# Patient Record
Sex: FEMALE | Race: WHITE | NOT HISPANIC OR LATINO | Employment: FULL TIME | URBAN - METROPOLITAN AREA
[De-identification: names, ages, dates, MRNs, and addresses within clinical notes are randomized per-mention and may not be internally consistent; named-entity substitution may affect disease eponyms.]

---

## 2017-03-21 ENCOUNTER — OFFICE VISIT (OUTPATIENT)
Dept: MEDICAL GROUP | Facility: CLINIC | Age: 47
End: 2017-03-21
Payer: COMMERCIAL

## 2017-03-21 ENCOUNTER — HOSPITAL ENCOUNTER (OUTPATIENT)
Dept: LAB | Facility: MEDICAL CENTER | Age: 47
End: 2017-03-21
Attending: INTERNAL MEDICINE
Payer: COMMERCIAL

## 2017-03-21 VITALS
OXYGEN SATURATION: 96 % | BODY MASS INDEX: 44.73 KG/M2 | WEIGHT: 262 LBS | DIASTOLIC BLOOD PRESSURE: 80 MMHG | TEMPERATURE: 97.4 F | SYSTOLIC BLOOD PRESSURE: 120 MMHG | HEART RATE: 67 BPM | HEIGHT: 64 IN

## 2017-03-21 DIAGNOSIS — E55.9 VITAMIN D DEFICIENCY DISEASE: ICD-10-CM

## 2017-03-21 DIAGNOSIS — E66.01 MORBID OBESITY, UNSPECIFIED OBESITY TYPE (HCC): ICD-10-CM

## 2017-03-21 LAB
25(OH)D3 SERPL-MCNC: 29 NG/ML (ref 30–100)
ALBUMIN SERPL BCP-MCNC: 4.1 G/DL (ref 3.2–4.9)
ALBUMIN/GLOB SERPL: 1.2 G/DL
ALP SERPL-CCNC: 78 U/L (ref 30–99)
ALT SERPL-CCNC: 18 U/L (ref 2–50)
ANION GAP SERPL CALC-SCNC: 5 MMOL/L (ref 0–11.9)
AST SERPL-CCNC: 17 U/L (ref 12–45)
BASOPHILS # BLD AUTO: 0.1 K/UL (ref 0–0.12)
BASOPHILS NFR BLD AUTO: 1.1 % (ref 0–1.8)
BILIRUB SERPL-MCNC: 0.3 MG/DL (ref 0.1–1.5)
BUN SERPL-MCNC: 11 MG/DL (ref 8–22)
CALCIUM SERPL-MCNC: 9.8 MG/DL (ref 8.5–10.5)
CHLORIDE SERPL-SCNC: 104 MMOL/L (ref 96–112)
CHOLEST SERPL-MCNC: 232 MG/DL (ref 100–199)
CO2 SERPL-SCNC: 30 MMOL/L (ref 20–33)
CREAT SERPL-MCNC: 0.71 MG/DL (ref 0.5–1.4)
EOSINOPHIL # BLD: 0.26 K/UL (ref 0–0.51)
EOSINOPHIL NFR BLD AUTO: 3 % (ref 0–6.9)
ERYTHROCYTE [DISTWIDTH] IN BLOOD BY AUTOMATED COUNT: 41.4 FL (ref 35.9–50)
GLOBULIN SER CALC-MCNC: 3.5 G/DL (ref 1.9–3.5)
GLUCOSE SERPL-MCNC: 108 MG/DL (ref 65–99)
HCT VFR BLD AUTO: 44.8 % (ref 37–47)
HDLC SERPL-MCNC: 47 MG/DL
HGB BLD-MCNC: 14.6 G/DL (ref 12–16)
IMM GRANULOCYTES # BLD AUTO: 0.02 K/UL (ref 0–0.11)
IMM GRANULOCYTES NFR BLD AUTO: 0.2 % (ref 0–0.9)
LDLC SERPL CALC-MCNC: 158 MG/DL
LYMPHOCYTES # BLD: 2.05 K/UL (ref 1–4.8)
LYMPHOCYTES NFR BLD AUTO: 23.4 % (ref 22–41)
MCH RBC QN AUTO: 29.6 PG (ref 27–33)
MCHC RBC AUTO-ENTMCNC: 32.6 G/DL (ref 33.6–35)
MCV RBC AUTO: 90.9 FL (ref 81.4–97.8)
MONOCYTES # BLD: 0.54 K/UL (ref 0–0.85)
MONOCYTES NFR BLD AUTO: 6.2 % (ref 0–13.4)
NEUTROPHILS # BLD: 5.78 K/UL (ref 2–7.15)
NEUTROPHILS NFR BLD AUTO: 66.1 % (ref 44–72)
NRBC # BLD AUTO: 0 K/UL
NRBC BLD-RTO: 0 /100 WBC
PLATELET # BLD AUTO: 321 K/UL (ref 164–446)
PMV BLD AUTO: 10.7 FL (ref 9–12.9)
POTASSIUM SERPL-SCNC: 4.9 MMOL/L (ref 3.6–5.5)
PROT SERPL-MCNC: 7.6 G/DL (ref 6–8.2)
RBC # BLD AUTO: 4.93 M/UL (ref 4.2–5.4)
SODIUM SERPL-SCNC: 139 MMOL/L (ref 135–145)
T4 FREE SERPL-MCNC: 0.67 NG/DL (ref 0.53–1.43)
TRIGL SERPL-MCNC: 134 MG/DL (ref 0–149)
TSH SERPL DL<=0.005 MIU/L-ACNC: 1.57 UIU/ML (ref 0.3–3.7)
WBC # BLD AUTO: 8.8 K/UL (ref 4.8–10.8)

## 2017-03-21 PROCEDURE — 84439 ASSAY OF FREE THYROXINE: CPT

## 2017-03-21 PROCEDURE — 82306 VITAMIN D 25 HYDROXY: CPT

## 2017-03-21 PROCEDURE — 99213 OFFICE O/P EST LOW 20 MIN: CPT | Performed by: INTERNAL MEDICINE

## 2017-03-21 PROCEDURE — 84443 ASSAY THYROID STIM HORMONE: CPT

## 2017-03-21 PROCEDURE — 80061 LIPID PANEL: CPT

## 2017-03-21 PROCEDURE — 85025 COMPLETE CBC W/AUTO DIFF WBC: CPT

## 2017-03-21 PROCEDURE — 36415 COLL VENOUS BLD VENIPUNCTURE: CPT

## 2017-03-21 PROCEDURE — 80053 COMPREHEN METABOLIC PANEL: CPT

## 2017-03-21 ASSESSMENT — PATIENT HEALTH QUESTIONNAIRE - PHQ9: CLINICAL INTERPRETATION OF PHQ2 SCORE: 0

## 2017-03-21 NOTE — MR AVS SNAPSHOT
"        Logan Mahoney   3/21/2017 8:40 AM   Office Visit   MRN: [id number]    Department:  [facility]   Dept Phone:  [phone]    Description:  Female : 1970   Provider:  Manuel Barbosa D.O.           Reason for Visit     Follow-Up no labs done      Allergies as of 3/21/2017     Allergen Noted Reactions    Ceclor [Cefaclor] 2012   Vomiting      You were diagnosed with     Morbid obesity, unspecified obesity type (CMS-HCC)   [1224196]       Vitamin D deficiency disease   [833049]         Vital Signs     Blood Pressure Pulse Temperature Height Weight Body Mass Index    120/80 mmHg 67 36.3 °C (97.4 °F) 1.626 m (5' 4\") 118.842 kg (262 lb) 44.95 kg/m2    Oxygen Saturation Smoking Status                96% Never Smoker           Basic Information     Date Of Birth Sex Race Ethnicity Preferred Language    1970 Female White Non- English      Problem List              ICD-10-CM Priority Class Noted - Resolved    Benign hypertension I10 Medium  2012 - Present    Preop cardiovascular exam Z01.810 High  2012 - Present    Nonspecific abnormal electrocardiogram (ECG) (EKG) R94.31 High  2012 - Present    S/P gastric surgery Z98.890   2016 - Present    Morbid obesity (CMS-HCC) E66.01   2016 - Present    Vitamin D deficiency disease E55.9   2016 - Present      Health Maintenance        Date Due Completion Dates    IMM DTaP/Tdap/Td Vaccine (1 - Tdap) 1991 ---    MAMMOGRAM 2012 ---    IMM INFLUENZA (1) 2016 ---    PAP SMEAR 3/10/2019 3/10/2016 (Done)    Override on 3/10/2016: Done            Current Immunizations     Tuberculin Skin Test 2016      Below and/or attached are the medications your provider expects you to take. Review all of your home medications and newly ordered medications with your provider and/or pharmacist. Follow medication instructions as directed by your provider and/or pharmacist. Please keep your medication list with you " and share with your provider. Update the information when medications are discontinued, doses are changed, or new medications (including over-the-counter products) are added; and carry medication information at all times in the event of emergency situations     Allergies:  CECLOR - Vomiting               Medications  Valid as of: March 21, 2017 -  9:09 AM    Generic Name Brand Name Tablet Size Instructions for use    Carvedilol (Tab) COREG 6.25 MG Take 1 Tab by mouth 2 times a day, with meals.        Ergocalciferol (Cap) DRISDOL 97498 UNITS Take 50,000 Units by mouth every 7 days.        Indomethacin (Cap) INDOCIN 50 MG Take 50 mg by mouth 3 times a day.        Lisinopril (Tab) PRINIVIL 5 MG Take 5 mg by mouth every day.        Ofloxacin (Solution) OCUFLOX 0.3 % Place 1 Drop in left eye 4 times a day.        PARoxetine HCl (Tab) PAXIL 20 MG Take 20 mg by mouth every day.        PARoxetine HCl (Tab) PAXIL 20 MG Take 1 Tab by mouth every day.        .                 Medicines prescribed today were sent to:     [facility] [address] [place] [address] - [address] [facility]    [address] [facility] [place] [address]    Phone: [phone] Fax: [phone]    Open 24 Hours?: No      Medication refill instructions:       If your prescription bottle indicates you have medication refills left, it is not necessary to call your provider’s office. Please contact your pharmacy and they will refill your medication.    If your prescription bottle indicates you do not have any refills left, you may request refills at any time through one of the following ways: The online [employer] system (except Urgent Care), by calling your provider’s office, or by asking your pharmacy to contact your provider’s office with a refill request. Medication refills are processed only during regular business hours and may not be available until the next business day. Your provider may request additional information or to have a  follow-up visit with you prior to refilling your medication.   *Please Note: Medication refills are assigned a new Rx number when refilled electronically. Your pharmacy may indicate that no refills were authorized even though a new prescription for the same medication is available at the pharmacy. Please request the medicine by name with the pharmacy before contacting your provider for a refill.        Your To Do List     Future Labs/Procedures Complete By Expires    CBC WITH DIFFERENTIAL  As directed 3/22/2018    COMP METABOLIC PANEL  As directed 3/22/2018    FREE THYROXINE  As directed 3/22/2018    LIPID PROFILE  As directed 3/22/2018    TSH  As directed 3/22/2018    VITAMIN D,25 HYDROXY  As directed 3/21/2018      Referral     A referral request has been sent to our patient care coordination department. Please allow 3-5 business days for us to process this request and contact you either by phone or mail. If you do not hear from us by the 5th business day, please call us at [phone].           MyChart Status: Patient Declined

## 2017-03-21 NOTE — PROGRESS NOTES
CC: Logan Mahoney is a 47 y.o. female is suffering from   Chief Complaint   Patient presents with   • Follow-Up     no labs done         SUBJECTIVE:  1. Morbid obesity, unspecified obesity type (CMS-HCC)  Pamela is here for follow-up continues to suffer from morbid obesity, we've discussed her working on diet exercise referred her over to the [facility] weight loss program.     2. Vitamin D deficiency disease  Patient with vitamin D deficiency is to continue on vitamin D supplementation will recheck labs        Past social, family, history:    Social History   Substance Use Topics   • Smoking status: Never Smoker    • Smokeless tobacco: Never Used   • Alcohol Use: No         MEDICATIONS:    Current outpatient prescriptions:   •  carvedilol (COREG) 6.25 MG Tab, Take 1 Tab by mouth 2 times a day, with meals., Disp: 180 Tab, Rfl: 3  •  paroxetine (PAXIL) 20 MG Tab, Take 1 Tab by mouth every day., Disp: 90 Tab, Rfl: 3  •  ofloxacin (OCUFLOX) 0.3 % Solution, Place 1 Drop in left eye 4 times a day., Disp: 1 Bottle, Rfl: 0  •  vitamin D, Ergocalciferol, (DRISDOL) 44997 UNIT CAPS capsule, Take 50,000 Units by mouth every 7 days., Disp: , Rfl:   •  paroxetine (PAXIL) 20 MG Tab, Take 20 mg by mouth every day., Disp: , Rfl:   •  indomethacin (INDOCIN) 50 MG Cap, Take 50 mg by mouth 3 times a day., Disp: , Rfl:   •  lisinopril (PRINIVIL) 5 MG TABS, Take 5 mg by mouth every day., Disp: , Rfl:     PROBLEMS:  Patient Active Problem List    Diagnosis Date Noted   • Preop cardiovascular exam 07/20/2012     Priority: High   • Nonspecific abnormal electrocardiogram (ECG) (EKG) 07/20/2012     Priority: High   • Benign hypertension 07/20/2012     Priority: Medium   • Vitamin D deficiency disease 12/02/2016   • Morbid obesity (CMS-HCC) 05/31/2016   • S/P gastric surgery 04/18/2016       REVIEW OF SYSTEMS:  Gen.:  No Nausea, Vomiting, fever, Chills.  Heart: No chest pain.  Lungs:  No shortness of Breath.  Psychological:  "Rinku unusual Anxiety depression     PHYSICAL EXAM   Constitutional: Alert, cooperative, not in acute distress.  Cardiovascular:  Rate Rhythm is regular without murmurs rubs clicks.     Thorax & Lungs: Clear to auscultation, no wheezing, rhonchi, or rales  HENT: Normocephalic, Atraumatic.  Eyes: PERRLA, EOMI, Conjunctiva normal.   Neck: Trachia is midline no swelling of the thyroid.   Lymphatic: No lymphadenopathy noted.   Neurologic: Alert & oriented x 3, cranial nerves II through XII are intact, Normal motor function, Normal sensory function, No focal deficits noted.   Psychiatric: Affect normal, Judgment normal, Mood normal.     VITAL SIGNS:/80 mmHg  Pulse 67  Temp(Src) 36.3 °C (97.4 °F)  Ht 1.626 m (5' 4\")  Wt 118.842 kg (262 lb)  BMI 44.95 kg/m2  SpO2 96%    Labs: Reviewed    Assessment:                                                     Plan:    1. Morbid obesity, unspecified obesity type (CMS-HCC)  Patient with morbid obesity status post gastric bypass surgery is to undergo lab work, referral written to [facility] weight loss program  - REFERRAL TO [facility] HEALTH IMPROVEMENT PROGRAMS (HIP) Services Requested:: General-HIP Staff to Evaluate Best Program; Reason for Visit:: Overweight/Obesity; Other:: [facility] WEIGHT LOSS PROGRAM.  - COMP METABOLIC PANEL; Future  - CBC WITH DIFFERENTIAL; Future  - LIPID PROFILE; Future  - FREE THYROXINE; Future  - TSH; Future    2. Vitamin D deficiency disease  Patient vitamin D deficiency is to undergo repeat testing  - VITAMIN D,25 HYDROXY; Future          "

## 2017-04-05 ENCOUNTER — TELEPHONE (OUTPATIENT)
Dept: MEDICAL GROUP | Facility: CLINIC | Age: 47
End: 2017-04-05

## 2017-04-05 DIAGNOSIS — E55.9 VITAMIN D DEFICIENCY DISEASE: ICD-10-CM

## 2017-04-05 DIAGNOSIS — E78.5 DYSLIPIDEMIA: ICD-10-CM

## 2017-04-05 NOTE — TELEPHONE ENCOUNTER
Please call the patient, I agree with diet exercise, patient will need to have her cholesterol rechecked in 3 months. If not successful but only need to think about medication, also have the patient take at least 1000 international units of vitamin D each day. Orders are in the hospital system and recheck cholesterol and her vitamin D

## 2017-04-05 NOTE — TELEPHONE ENCOUNTER
1. Caller Name: Logan Erikson                      Call Back Number: [phone] (home)     2. Message: pt will like to know what is it that you will recommend for her blood results, she is making changes like exercising and stop drinking coffee and watching her diet, what else would you recommend. Thank you     3. Patient approves office to leave a detailed voicemail/MyChart message: yes

## 2017-05-15 RX ORDER — PAROXETINE HYDROCHLORIDE 20 MG/1
TABLET, FILM COATED ORAL
Qty: 90 TAB | Refills: 3 | Status: SHIPPED | OUTPATIENT
Start: 2017-05-15 | End: 2017-05-22 | Stop reason: SDUPTHER

## 2017-05-19 ENCOUNTER — TELEPHONE (OUTPATIENT)
Dept: MEDICAL GROUP | Facility: CLINIC | Age: 47
End: 2017-05-19

## 2017-05-19 NOTE — TELEPHONE ENCOUNTER
ESTABLISHED PATIENT PRE-VISIT PLANNING     Note: Patient will not be contacted if there is no indication to call.     1.  Reviewed note from last office visit with PCP and/or other med group provider: Yes    2.  If any orders were placed at last visit, do we have Results/Consult Notes?        •  Labs - Labs ordered, completed and results are in chart.       •  Imaging - Imaging was not ordered at last office visit.       •  Referrals - Referral ordered, patient has NOT been seen. Problem with referral, called [facility]  [address] [facility] [address]   [place] [address]  Phone:  [phone]  spoke with Radhika, looking into whether auth is truly necessary            3.  Immunizations were updated in Epic using WebIZ?: Yes       •  Web [employer] Recommendations: Hep B, adult MMR Td (adult), adsorbed CPOX (Varicella) Hep A, adult Influenza w/preserv.      4.  Patient is due for the following Health Maintenance Topics:   Health Maintenance Due   Topic Date Due   • MAMMOGRAM  02/11/2012           5.  Patient was not informed to arrive 15 min prior to their scheduled appointment and bring in their medication bottles.

## 2017-05-22 ENCOUNTER — OFFICE VISIT (OUTPATIENT)
Dept: MEDICAL GROUP | Facility: CLINIC | Age: 47
End: 2017-05-22
Payer: COMMERCIAL

## 2017-05-22 VITALS
HEIGHT: 64 IN | TEMPERATURE: 98.6 F | BODY MASS INDEX: 45.55 KG/M2 | HEART RATE: 80 BPM | OXYGEN SATURATION: 96 % | RESPIRATION RATE: 18 BRPM | DIASTOLIC BLOOD PRESSURE: 76 MMHG | WEIGHT: 266.8 LBS | SYSTOLIC BLOOD PRESSURE: 128 MMHG

## 2017-05-22 DIAGNOSIS — E55.9 VITAMIN D DEFICIENCY DISEASE: ICD-10-CM

## 2017-05-22 DIAGNOSIS — E78.5 DYSLIPIDEMIA: ICD-10-CM

## 2017-05-22 DIAGNOSIS — I10 ESSENTIAL HYPERTENSION: ICD-10-CM

## 2017-05-22 DIAGNOSIS — E66.01 MORBID OBESITY, UNSPECIFIED OBESITY TYPE (HCC): ICD-10-CM

## 2017-05-22 DIAGNOSIS — F41.9 ANXIETY: ICD-10-CM

## 2017-05-22 PROCEDURE — 99214 OFFICE O/P EST MOD 30 MIN: CPT | Performed by: INTERNAL MEDICINE

## 2017-05-22 RX ORDER — CARVEDILOL 6.25 MG/1
6.25 TABLET ORAL 2 TIMES DAILY WITH MEALS
Qty: 180 TAB | Refills: 3 | Status: SHIPPED | OUTPATIENT
Start: 2017-05-22 | End: 2017-08-14

## 2017-05-22 RX ORDER — ALPRAZOLAM 0.25 MG/1
0.25 TABLET ORAL NIGHTLY PRN
COMMUNITY
End: 2018-09-02

## 2017-05-22 RX ORDER — PAROXETINE HYDROCHLORIDE 20 MG/1
20 TABLET, FILM COATED ORAL
Qty: 90 TAB | Refills: 3 | Status: SHIPPED | OUTPATIENT
Start: 2017-05-22 | End: 2018-05-29

## 2017-05-22 NOTE — MR AVS SNAPSHOT
"        Logan Mahoney   2017 3:00 PM   Office Visit   MRN: [id number]    Department:  [facility]   Dept Phone:  [phone]    Description:  Female : 1970   Provider:  Manuel Barbosa D.O.           Reason for Visit     Follow-Up           Allergies as of 2017     Allergen Noted Reactions    Ceclor [Cefaclor] 2012   Vomiting      You were diagnosed with     Morbid obesity, unspecified obesity type (CMS-HCC)   [9435618]       Vitamin D deficiency disease   [331866]       Dyslipidemia   [111206]       Essential hypertension   [3747024]       Anxiety   [087324]         Vital Signs     Blood Pressure Pulse Temperature Respirations Height Weight    128/76 mmHg 80 37 °C (98.6 °F) 18 1.632 m (5' 4.25\") 121.02 kg (266 lb 12.8 oz)    Body Mass Index Oxygen Saturation Breastfeeding? Smoking Status          45.44 kg/m2 96% No Never Smoker         Basic Information     Date Of Birth Sex Race Ethnicity Preferred Language    1970 Female White Non- English      Your appointments     Aug 22, 2017  3:00 PM   Established Patient with Manuel Barbosa D.O.   [address] [address] [phone]           You will be receiving a confirmation call a few days before your appointment from our automated call confirmation system.              Problem List              ICD-10-CM Priority Class Noted - Resolved    Benign hypertension I10 Medium  2012 - Present    Preop cardiovascular exam Z01.810 High  2012 - Present    Nonspecific abnormal electrocardiogram (ECG) (EKG) R94.31 High  2012 - Present    S/P gastric surgery Z98.890   2016 - Present    Morbid obesity (CMS-HCC) E66.01   2016 - Present    Vitamin D deficiency disease E55.9   2016 - Present      Health Maintenance        Date Due Completion Dates    MAMMOGRAM 2012 ---    PAP SMEAR 3/10/2019 3/10/2016 (Done)    Override on 3/10/2016: Done   " IMM DTaP/Tdap/Td Vaccine (2 - Td) 8/23/2021 8/23/2011            Current Immunizations     Tdap Vaccine 8/23/2011    Tuberculin Skin Test 5/23/2016      Below and/or attached are the medications your provider expects you to take. Review all of your home medications and newly ordered medications with your provider and/or pharmacist. Follow medication instructions as directed by your provider and/or pharmacist. Please keep your medication list with you and share with your provider. Update the information when medications are discontinued, doses are changed, or new medications (including over-the-counter products) are added; and carry medication information at all times in the event of emergency situations     Allergies:  CECLOR - Vomiting               Medications  Valid as of: May 22, 2017 -  3:49 PM    Generic Name Brand Name Tablet Size Instructions for use    ALPRAZolam (Tab) XANAX 0.25 MG Take 0.25 mg by mouth at bedtime as needed for Sleep.        Carvedilol (Tab) COREG 6.25 MG Take 1 Tab by mouth 2 times a day, with meals.        Ergocalciferol (Cap) DRISDOL 93051 UNITS Take 50,000 Units by mouth every 7 days.        Indomethacin (Cap) INDOCIN 50 MG Take 50 mg by mouth 3 times a day.        Lisinopril (Tab) PRINIVIL 5 MG Take 5 mg by mouth every day.        Ofloxacin (Solution) OCUFLOX 0.3 % Place 1 Drop in left eye 4 times a day.        PARoxetine HCl (Tab) PAXIL 20 MG Take 1 Tab by mouth every day.        .                 Medicines prescribed today were sent to:     [facility] [id number] [place] [address], [place] - [facility] AT [facility] [address]    Phone: [phone] Fax: [phone]    Open 24 Hours?: No      Medication refill instructions:       If your prescription bottle indicates you have medication refills left, it is not necessary to call your provider’s office. Please contact your pharmacy and they will refill your medication.    If your prescription  bottle indicates you do not have any refills left, you may request refills at any time through one of the following ways: The online [employer] system (except Urgent Care), by calling your provider’s office, or by asking your pharmacy to contact your provider’s office with a refill request. Medication refills are processed only during regular business hours and may not be available until the next business day. Your provider may request additional information or to have a follow-up visit with you prior to refilling your medication.   *Please Note: Medication refills are assigned a new Rx number when refilled electronically. Your pharmacy may indicate that no refills were authorized even though a new prescription for the same medication is available at the pharmacy. Please request the medicine by name with the pharmacy before contacting your provider for a refill.        Your To Do List     Future Labs/Procedures Complete By Expires    COMP METABOLIC PANEL  As directed 5/23/2018    LIPID PROFILE  As directed 5/23/2018         [employer] Status: Patient Declined

## 2017-05-22 NOTE — PROGRESS NOTES
CC: Logan Mahoney is a 47 y.o. female is suffering from   Chief Complaint   Patient presents with   • Follow-Up         SUBJECTIVE:  1. Morbid obesity, unspecified obesity type (CMS-HCC)  Pamela is here for follow-up, states that she is doing well, is working on losing weight, does have a referral to the [facility] weight loss program which I recommended she engage in.     2. Vitamin D deficiency disease  Patient with history of vitamin D deficiency, recheck levels        Past social, family, history:   Social History   Substance Use Topics   • Smoking status: Never Smoker    • Smokeless tobacco: Never Used   • Alcohol Use: No         MEDICATIONS:    Current outpatient prescriptions:   •  alprazolam (XANAX) 0.25 MG Tab, Take 0.25 mg by mouth at bedtime as needed for Sleep., Disp: , Rfl:   •  paroxetine (PAXIL) 20 MG Tab, TAKE 1 TABLET BY MOUTH EVERY DAY, Disp: 90 Tab, Rfl: 3  •  carvedilol (COREG) 6.25 MG Tab, Take 1 Tab by mouth 2 times a day, with meals., Disp: 180 Tab, Rfl: 3  •  indomethacin (INDOCIN) 50 MG Cap, Take 50 mg by mouth 3 times a day., Disp: , Rfl:   •  vitamin D, Ergocalciferol, (DRISDOL) 01079 UNIT CAPS capsule, Take 50,000 Units by mouth every 7 days., Disp: , Rfl:   •  ofloxacin (OCUFLOX) 0.3 % Solution, Place 1 Drop in left eye 4 times a day., Disp: 1 Bottle, Rfl: 0  •  lisinopril (PRINIVIL) 5 MG TABS, Take 5 mg by mouth every day., Disp: , Rfl:     PROBLEMS:  Patient Active Problem List    Diagnosis Date Noted   • Preop cardiovascular exam 07/20/2012     Priority: High   • Nonspecific abnormal electrocardiogram (ECG) (EKG) 07/20/2012     Priority: High   • Benign hypertension 07/20/2012     Priority: Medium   • Vitamin D deficiency disease 12/02/2016   • Morbid obesity (CMS-HCC) 05/31/2016   • S/P gastric surgery 04/18/2016       REVIEW OF SYSTEMS:  Gen.:  No Nausea, Vomiting, fever, Chills.  Heart: No chest pain.  Lungs:  No shortness of Breath.  Psychological: Rinku  "unusual Anxiety depression     PHYSICAL EXAM   Constitutional: Alert, cooperative, not in acute distress.  Cardiovascular:  Rate Rhythm is regular without murmurs rubs clicks.     Thorax & Lungs: Clear to auscultation, no wheezing, rhonchi, or rales  HENT: Normocephalic, Atraumatic.  Eyes: PERRLA, EOMI, Conjunctiva normal.   Neck: Trachia is midline no swelling of the thyroid.   Neurologic: Alert & oriented x 3, cranial nerves II through XII are intact, Normal motor function, Normal sensory function, No focal deficits noted.   Psychiatric: Affect normal, Judgment normal, Mood normal.     VITAL SIGNS:/76 mmHg  Pulse 80  Temp(Src) 37 °C (98.6 °F)  Resp 18  Ht 1.632 m (5' 4.25\")  Wt 121.02 kg (266 lb 12.8 oz)  BMI 45.44 kg/m2  SpO2 96%  Breastfeeding? No    Labs: Reviewed    Assessment:                                                     Plan:    1. Morbid obesity, unspecified obesity type (CMS-HCC)  [facility]     2. Vitamin D deficiency disease  Recheck prn.     3. Dyslipidemia  Recheck cholesterol 3 months  - LIPID PROFILE; Future  - COMP METABOLIC PANEL; Future    4. Essential hypertension  Continue on Coreg  - carvedilol (COREG) 6.25 MG Tab; Take 1 Tab by mouth 2 times a day, with meals.  Dispense: 180 Tab; Refill: 3    5. Anxiety  Continue on Paxil  - paroxetine (PAXIL) 20 MG Tab; Take 1 Tab by mouth every day.  Dispense: 90 Tab; Refill: 3    "

## 2017-05-22 NOTE — PROGRESS NOTES
"Subjective:      Logan Mahoney is a 47 y.o. female who presents with Follow-Up            HPI    ROS       Objective:     /76 mmHg  Pulse 80  Temp(Src) 37 °C (98.6 °F)  Resp 18  Ht 1.632 m (5' 4.25\")  Wt 121.02 kg (266 lb 12.8 oz)  BMI 45.44 kg/m2  SpO2 96%  Breastfeeding? No     Physical Exam            Assessment/Plan:     There are no diagnoses linked to this encounter.      "

## 2017-08-14 RX ORDER — CARVEDILOL 6.25 MG/1
TABLET ORAL
Qty: 180 TAB | Refills: 3 | Status: SHIPPED | OUTPATIENT
Start: 2017-08-14 | End: 2018-08-21 | Stop reason: SDUPTHER

## 2018-05-29 RX ORDER — PAROXETINE HYDROCHLORIDE 20 MG/1
TABLET, FILM COATED ORAL
Qty: 90 TAB | Refills: 3 | Status: ON HOLD | OUTPATIENT
Start: 2018-05-29 | End: 2018-11-05

## 2018-06-13 ENCOUNTER — HOSPITAL ENCOUNTER (OUTPATIENT)
Dept: LAB | Facility: MEDICAL CENTER | Age: 48
End: 2018-06-13
Attending: INTERNAL MEDICINE
Payer: COMMERCIAL

## 2018-06-13 DIAGNOSIS — E78.5 DYSLIPIDEMIA: ICD-10-CM

## 2018-06-13 LAB
ALBUMIN SERPL BCP-MCNC: 3.9 G/DL (ref 3.2–4.9)
ALBUMIN/GLOB SERPL: 1 G/DL
ALP SERPL-CCNC: 69 U/L (ref 30–99)
ALT SERPL-CCNC: 18 U/L (ref 2–50)
ANION GAP SERPL CALC-SCNC: 7 MMOL/L (ref 0–11.9)
AST SERPL-CCNC: 20 U/L (ref 12–45)
BILIRUB SERPL-MCNC: 0.3 MG/DL (ref 0.1–1.5)
BUN SERPL-MCNC: 10 MG/DL (ref 8–22)
CALCIUM SERPL-MCNC: 9 MG/DL (ref 8.5–10.5)
CHLORIDE SERPL-SCNC: 106 MMOL/L (ref 96–112)
CHOLEST SERPL-MCNC: 218 MG/DL (ref 100–199)
CO2 SERPL-SCNC: 27 MMOL/L (ref 20–33)
CREAT SERPL-MCNC: 0.78 MG/DL (ref 0.5–1.4)
GLOBULIN SER CALC-MCNC: 3.8 G/DL (ref 1.9–3.5)
GLUCOSE SERPL-MCNC: 121 MG/DL (ref 65–99)
HDLC SERPL-MCNC: 35 MG/DL
LDLC SERPL CALC-MCNC: 160 MG/DL
POTASSIUM SERPL-SCNC: 4.4 MMOL/L (ref 3.6–5.5)
PROT SERPL-MCNC: 7.7 G/DL (ref 6–8.2)
SODIUM SERPL-SCNC: 140 MMOL/L (ref 135–145)
TRIGL SERPL-MCNC: 113 MG/DL (ref 0–149)

## 2018-06-13 PROCEDURE — 80061 LIPID PANEL: CPT

## 2018-06-13 PROCEDURE — 80053 COMPREHEN METABOLIC PANEL: CPT

## 2018-06-13 PROCEDURE — 36415 COLL VENOUS BLD VENIPUNCTURE: CPT

## 2018-06-26 ENCOUNTER — HOSPITAL ENCOUNTER (EMERGENCY)
Facility: MEDICAL CENTER | Age: 48
End: 2018-06-26
Attending: EMERGENCY MEDICINE
Payer: COMMERCIAL

## 2018-06-26 VITALS
HEIGHT: 64 IN | TEMPERATURE: 98.6 F | BODY MASS INDEX: 46.52 KG/M2 | OXYGEN SATURATION: 97 % | DIASTOLIC BLOOD PRESSURE: 95 MMHG | HEART RATE: 68 BPM | WEIGHT: 272.49 LBS | SYSTOLIC BLOOD PRESSURE: 165 MMHG | RESPIRATION RATE: 16 BRPM

## 2018-06-26 DIAGNOSIS — S16.1XXA STRAIN OF NECK MUSCLE, INITIAL ENCOUNTER: ICD-10-CM

## 2018-06-26 DIAGNOSIS — S43.402A SPRAIN OF LEFT SHOULDER, UNSPECIFIED SHOULDER SPRAIN TYPE, INITIAL ENCOUNTER: ICD-10-CM

## 2018-06-26 DIAGNOSIS — S63.502A WRIST SPRAIN, LEFT, INITIAL ENCOUNTER: ICD-10-CM

## 2018-06-26 PROCEDURE — 99282 EMERGENCY DEPT VISIT SF MDM: CPT

## 2018-06-26 ASSESSMENT — PAIN SCALES - GENERAL: PAINLEVEL_OUTOF10: 8
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
## 2018-06-27 NOTE — DISCHARGE INSTRUCTIONS
Cryotherapy  Introduction  WHAT IS CRYOTHERAPY?  Cryotherapy, or cold therapy, is a treatment that uses cold temperatures to treat an injury or medical condition. It includes using cold packs or ice packs to reduce pain and swelling. Only use cryotherapy if your doctor says it is okay.  HOW DO I USE CRYOTHERAPY?  · Place a towel between the cold source and your skin.  · Apply the cold source for no more than 20 minutes at a time.  · Check your skin after 5 minutes to make sure there are no signs of a poor response to cold or skin damage. Check for:  ¨ White spots on your skin. Your skin may look blotchy or mottled.  ¨ Skin that looks blue or pale.  ¨ Skin that feels waxy or hard.  · Repeat these steps as many times each day as told by your doctor.  HOW CAN I MAKE A COLD PACK?  When using a cold pack at home to reduce pain and swelling, you can use:  · A silica gel cold pack that has been left in the freezer. You can buy this online or in stores.  · A plastic bag of frozen vegetables.  · A sealable plastic bag that has been filled with crushed ice.  Always wrap the pack in a dry or damp towel to avoid direct contact with your skin.  WHEN SHOULD I CALL MY DOCTOR?  Call your doctor if:  · You start to have white spots on your skin. This may give your skin a blotchy or mottled look.  · Your skin turns blue or pale.  · Your skin becomes waxy or hard.  · Your swelling gets worse.  This information is not intended to replace advice given to you by your health care provider. Make sure you discuss any questions you have with your health care provider.  Document Released: 06/05/2009 Document Revised: 05/25/2017 Document Reviewed: 08/31/2016  © 2017 Elsevier    
Eyes:  No visual changes, eye pain or discharge.  ENMT:  Perioral numbness. No hearing changes, pain, discharge or infections. No neck pain or stiffness.  Cardiac:  No chest pain, SOB or edema.   Respiratory:  No cough or respiratory distress.   GI:  No nausea, vomiting, diarrhea or abdominal pain.  :  No dysuria, frequency or burning.  MS:  L arm numbness. No myalgia, muscle weakness, joint pain or back pain.  Neuro:  No headache or weakness.  No LOC.   Skin:  No skin rash.
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
## 2018-07-12 ENCOUNTER — OFFICE VISIT (OUTPATIENT)
Dept: MEDICAL GROUP | Facility: CLINIC | Age: 48
End: 2018-07-12
Payer: COMMERCIAL

## 2018-07-12 VITALS
WEIGHT: 274 LBS | HEIGHT: 65 IN | RESPIRATION RATE: 16 BRPM | BODY MASS INDEX: 45.65 KG/M2 | DIASTOLIC BLOOD PRESSURE: 90 MMHG | TEMPERATURE: 98.6 F | OXYGEN SATURATION: 95 % | HEART RATE: 68 BPM | SYSTOLIC BLOOD PRESSURE: 138 MMHG

## 2018-07-12 DIAGNOSIS — Z12.39 SCREENING FOR BREAST CANCER: ICD-10-CM

## 2018-07-12 DIAGNOSIS — R73.02 IGT (IMPAIRED GLUCOSE TOLERANCE): ICD-10-CM

## 2018-07-12 DIAGNOSIS — R23.8 OTHER SKIN CHANGES: ICD-10-CM

## 2018-07-12 DIAGNOSIS — Z98.890 S/P GASTRIC SURGERY: ICD-10-CM

## 2018-07-12 PROCEDURE — 99214 OFFICE O/P EST MOD 30 MIN: CPT | Performed by: INTERNAL MEDICINE

## 2018-07-12 ASSESSMENT — PATIENT HEALTH QUESTIONNAIRE - PHQ9: CLINICAL INTERPRETATION OF PHQ2 SCORE: 0

## 2018-07-12 NOTE — PROGRESS NOTES
CC: Logan Mahoney is a 48 y.o. female is suffering from   Chief Complaint   Patient presents with   • Malaise         SUBJECTIVE:  1. S/P gastric surgery  Shells here for follow-up, is status post gastric surgery, states that she still having problems with feeling tired.    2. Screening for breast cancer  Patient's in need of screening for breast cancer orders written    3. Other skin changes  Patient has a small approximately 2 mm skin nodule at her right lateral nose.    4. IGT (impaired glucose tolerance)  Patient will need additional evaluation from impared Glucose tolerance. Patient because of her obesity is at significant risk for the development of dyana diabetes        Past social, family, history: , lives with her mother   Social History   Substance Use Topics   • Smoking status: Never Smoker   • Smokeless tobacco: Never Used   • Alcohol use No         MEDICATIONS:    Current Outpatient Prescriptions:   •  PARoxetine (PAXIL) 20 MG Tab, TAKE 1 TABLET BY MOUTH EVERY DAY, Disp: 90 Tab, Rfl: 3  •  carvedilol (COREG) 6.25 MG Tab, TAKE 1 TABLET BY MOUTH TWICE DAILY WITH MEALS, Disp: 180 Tab, Rfl: 3  •  alprazolam (XANAX) 0.25 MG Tab, Take 0.25 mg by mouth at bedtime as needed for Sleep., Disp: , Rfl:   •  ofloxacin (OCUFLOX) 0.3 % Solution, Place 1 Drop in left eye 4 times a day., Disp: 1 Bottle, Rfl: 0  •  indomethacin (INDOCIN) 50 MG Cap, Take 50 mg by mouth 3 times a day., Disp: , Rfl:   •  lisinopril (PRINIVIL) 5 MG TABS, Take 5 mg by mouth every day., Disp: , Rfl:   •  vitamin D, Ergocalciferol, (DRISDOL) 93022 UNIT CAPS capsule, Take 50,000 Units by mouth every 7 days., Disp: , Rfl:     PROBLEMS:  Patient Active Problem List    Diagnosis Date Noted   • Preop cardiovascular exam 07/20/2012     Priority: High   • Nonspecific abnormal electrocardiogram (ECG) (EKG) 07/20/2012     Priority: High   • Benign hypertension 07/20/2012     Priority: Medium   • Vitamin D deficiency disease 12/02/2016   •  "Morbid obesity (HCC) 05/31/2016   • S/P gastric surgery 04/18/2016       REVIEW OF SYSTEMS:  Gen.:  No Nausea, Vomiting, fever, Chills.  Heart: No chest pain.  Lungs:  No shortness of Breath.  Psychological: Rinku unusual Anxiety depression     PHYSICAL EXAM   Constitutional: Alert, cooperative, not in acute distress.  Cardiovascular:  Rate Rhythm is regular without murmurs rubs clicks.     Thorax & Lungs: Clear to auscultation, no wheezing, rhonchi, or rales  HENT: Normocephalic, Atraumatic.  Eyes: PERRLA, EOMI, Conjunctiva normal.   Neck: Trachia is midline no swelling of the thyroid.   Lymphatic: No lymphadenopathy noted.   Neurologic: Alert & oriented x 3, cranial nerves II through XII are intact, Normal motor function, Normal sensory function, No focal deficits noted.   Psychiatric: Affect normal, Judgment normal, Mood normal.     VITAL SIGNS:/90   Pulse 68   Temp 37 °C (98.6 °F)   Resp 16   Ht 1.638 m (5' 4.5\")   Wt 124.3 kg (274 lb)   SpO2 95%   BMI 46.31 kg/m²     Labs: Reviewed    Assessment:                                                     Plan:    1. S/P gastric surgery  Referral rewritten to bariatric surgery patient is to be sent for nutritional counseling  - REFERRAL TO BARIATRIC SURGERY  - REFERRAL TO [facility] HEALTH IMPROVEMENT PROGRAMS (HIP) Services Requested: Registered Dietitian for Medical Nutrition Therapy; Reason for Visit: Overweight/Obesity    2. Screening for breast cancer  Screening mammogram ordered  - MA-SCREEN MAMMO W/CAD-BILAT; Future    3. Other skin changes  Referral to dermatology  - REFERRAL TO DERMATOLOGY    4. IGT (impaired glucose tolerance)  Recheck hemoglobin A1c basic metabolic panel in 3 months  - HBA1C; Future  - BASIC METABOLIC PANEL; Future        "

## 2018-08-21 RX ORDER — CARVEDILOL 6.25 MG/1
TABLET ORAL
Qty: 180 TAB | Refills: 3 | Status: SHIPPED | OUTPATIENT
Start: 2018-08-21 | End: 2019-04-26 | Stop reason: SDUPTHER

## 2018-08-25 ENCOUNTER — HOSPITAL ENCOUNTER (OUTPATIENT)
Dept: RADIOLOGY | Facility: MEDICAL CENTER | Age: 48
End: 2018-08-25
Attending: INTERNAL MEDICINE
Payer: COMMERCIAL

## 2018-08-25 DIAGNOSIS — Z12.39 SCREENING FOR BREAST CANCER: ICD-10-CM

## 2018-08-25 PROCEDURE — 77067 SCR MAMMO BI INCL CAD: CPT

## 2018-09-02 ENCOUNTER — OFFICE VISIT (OUTPATIENT)
Dept: URGENT CARE | Facility: PHYSICIAN GROUP | Age: 48
End: 2018-09-02
Payer: COMMERCIAL

## 2018-09-02 VITALS
OXYGEN SATURATION: 98 % | RESPIRATION RATE: 14 BRPM | WEIGHT: 265 LBS | TEMPERATURE: 97.7 F | HEART RATE: 71 BPM | DIASTOLIC BLOOD PRESSURE: 80 MMHG | SYSTOLIC BLOOD PRESSURE: 128 MMHG | BODY MASS INDEX: 44.15 KG/M2 | HEIGHT: 65 IN

## 2018-09-02 DIAGNOSIS — H92.02 ACUTE PAIN OF LEFT EAR: ICD-10-CM

## 2018-09-02 PROCEDURE — 99213 OFFICE O/P EST LOW 20 MIN: CPT | Performed by: NURSE PRACTITIONER

## 2018-09-02 RX ORDER — FLUTICASONE PROPIONATE 50 MCG
1 SPRAY, SUSPENSION (ML) NASAL 2 TIMES DAILY
Qty: 16 G | Refills: 0 | Status: ON HOLD | OUTPATIENT
Start: 2018-09-02 | End: 2018-11-05

## 2018-09-02 ASSESSMENT — PAIN SCALES - GENERAL: PAINLEVEL: 6=MODERATE PAIN

## 2018-09-02 NOTE — PROGRESS NOTES
Chief Complaint   Patient presents with   • Ear Pain     Left ear. No fever. Headache. Onset Tues       HISTORY OF PRESENT ILLNESS: Patient is a 48 y.o. female who presents to urgent care today with complaints of left ear pain. States for the past five days she has had pain to her left ear. The pain is intermittent. She admits to associated congestion feeling of her ear and a mild headache at times. She denies nasal congestion, fever, chills, drainage, sinus pressure, numbness, tingling, neck pain, chest pain, sensory changes, changes in vision, or cough. She admits to a long history of recurrent ear and sinus infections. She has not taken any medication for symptom relief.      Patient Active Problem List    Diagnosis Date Noted   • Preop cardiovascular exam 07/20/2012     Priority: High   • Nonspecific abnormal electrocardiogram (ECG) (EKG) 07/20/2012     Priority: High   • Benign hypertension 07/20/2012     Priority: Medium   • Vitamin D deficiency disease 12/02/2016   • Morbid obesity (HCC) 05/31/2016   • S/P gastric surgery 04/18/2016       Allergies:Ceclor [cefaclor]    Current Outpatient Prescriptions Ordered in [facility]   Medication Sig Dispense Refill   • multivitamin (THERAGRAN) Tab Take 1 Tab by mouth every day.     • fluticasone (FLONASE) 50 MCG/ACT nasal spray Spray 1 Spray in nose 2 times a day. 16 g 0   • carvedilol (COREG) 6.25 MG Tab TAKE 1 TABLET BY MOUTH TWICE DAILY WITH MEALS 180 Tab 3   • PARoxetine (PAXIL) 20 MG Tab TAKE 1 TABLET BY MOUTH EVERY DAY 90 Tab 3   • vitamin D, Ergocalciferol, (DRISDOL) 15663 UNIT CAPS capsule Take 50,000 Units by mouth every 7 days.       No current [facility]-ordered facility-administered medications on file.        Past Medical History:   Diagnosis Date   • Benign hypertension 7/20/2012   • Morbid obesity (HCC) 5/31/2016   • Nonspecific abnormal electrocardiogram (ECG) (EKG) 7/20/2012   • Preop cardiovascular exam 7/20/2012   • S/P gastric surgery 4/18/2016    [facility]    • Vitamin D deficiency disease 12/2/2016       Social History   Substance Use Topics   • Smoking status: Never Smoker   • Smokeless tobacco: Never Used   • Alcohol use No       Family Status   Relation Status   • Mo (Not Specified)   • Fa (Not Specified)   • PGFa (Not Specified)     Family History   Problem Relation Age of Onset   • Hypertension Mother    • Heart Attack Father    • Other Father    • Stroke Father    • Hypertension Father    • Heart Attack Paternal Grandfather        ROS:  Review of Systems   Constitutional: Negative for fever, chills, weight loss, malaise, and fatigue.   HENT: Positive for ear pain. Negative for nosebleeds, congestion, sore throat and neck pain.    Eyes: Negative for vision changes.   Neuro: Negative for headache, sensory changes, weakness, seizure, LOC.   Cardiovascular: Negative for chest pain, palpitations, orthopnea and leg swelling.   Respiratory: Negative for cough, sputum production, shortness of breath and wheezing.   Gastrointestinal: Negative for abdominal pain, nausea, vomiting or diarrhea.   Genitourinary: Negative for dysuria, urgency and frequency.  Musculoskeletal: Negative for falls, neck pain, back pain, joint pain, myalgias.   Skin: Negative for rash, diaphoresis.     Exam:  Blood pressure 128/80, pulse 71, temperature 36.5 °C (97.7 °F), resp. rate 14, height 1.638 m (5' 4.5\"), weight 120.2 kg (265 lb), SpO2 98 %.  General: well-nourished, well-developed female in NAD  Head: normocephalic, atraumatic  Eyes: PERRLA, no conjunctival injection, acuity grossly intact, lids normal.  Ears: normal shape and symmetry, no tenderness, no discharge. External canals are without any significant edema or erythema. Tympanic membranes are without any inflammation, no effusion. Gross auditory acuity is intact.  Nose: symmetrical without tenderness, no discharge.  Mouth/Throat: reasonable hygiene, no erythema, exudates or tonsillar enlargement.  Neck: no " masses, range of motion within normal limits, no tracheal deviation. No obvious thyroid enlargement.   Lymph: no cervical adenopathy. No supraclavicular adenopathy.   Neuro: alert and oriented. Cranial nerves 1-12 grossly intact. No sensory deficit.   Cardiovascular: regular rate and rhythm. No edema.  Pulmonary: no distress. Chest is symmetrical with respiration, no wheezes, crackles, or rhonchi.   Musculoskeletal: no clubbing, appropriate muscle tone, gait is stable.  Skin: warm, dry, intact, no clubbing, no cyanosis, no rashes.   Psych: appropriate mood, affect, judgement.         Assessment/Plan:  1. Acute pain of left ear  fluticasone (FLONASE) 50 MCG/ACT nasal spray       Ear exam is benign today, without signs of bacterial infection requiring abx. I have encouraged Flonase, sleeping with HOB elevated and a few days of a decongestant for symptom relief. If no improvement, or any worsening, should return to clinic.   Supportive care, differential diagnoses, and indications for immediate follow-up discussed with patient.   Pathogenesis of diagnosis discussed including typical length and natural progression.   Instructed to return to clinic or nearest emergency department for any change in condition, further concerns, or worsening of symptoms.  Patient states understanding of the plan of care and discharge instructions.  Instructed to make an appointment, for follow up, with her primary care provider.        Please note that this dictation was created using voice recognition software. I have made every reasonable attempt to correct obvious errors, but I expect that there are errors of grammar and possibly content that I did not discover before finalizing the note.      ROLO Jara.

## 2018-09-13 ENCOUNTER — HOSPITAL ENCOUNTER (OUTPATIENT)
Dept: RADIOLOGY | Facility: MEDICAL CENTER | Age: 48
End: 2018-09-13
Attending: PHYSICIAN ASSISTANT
Payer: COMMERCIAL

## 2018-09-13 DIAGNOSIS — R11.10 VOMITING, INTRACTABILITY OF VOMITING NOT SPECIFIED, PRESENCE OF NAUSEA NOT SPECIFIED, UNSPECIFIED VOMITING TYPE: ICD-10-CM

## 2018-09-13 DIAGNOSIS — Z98.84 HISTORY OF LAPAROSCOPIC ADJUSTABLE GASTRIC BANDING: ICD-10-CM

## 2018-09-13 PROCEDURE — 74241 DX-UPPER GI-SERIES WITH KUB: CPT

## 2018-11-02 DIAGNOSIS — Z01.812 PRE-OPERATIVE LABORATORY EXAMINATION: ICD-10-CM

## 2018-11-02 DIAGNOSIS — Z01.810 PRE-OPERATIVE CARDIOVASCULAR EXAMINATION: ICD-10-CM

## 2018-11-02 LAB
ANION GAP SERPL CALC-SCNC: 6 MMOL/L (ref 0–11.9)
BUN SERPL-MCNC: 10 MG/DL (ref 8–22)
CALCIUM SERPL-MCNC: 10.1 MG/DL (ref 8.5–10.5)
CHLORIDE SERPL-SCNC: 104 MMOL/L (ref 96–112)
CO2 SERPL-SCNC: 29 MMOL/L (ref 20–33)
CREAT SERPL-MCNC: 0.78 MG/DL (ref 0.5–1.4)
EKG IMPRESSION: NORMAL
ERYTHROCYTE [DISTWIDTH] IN BLOOD BY AUTOMATED COUNT: 43.7 FL (ref 35.9–50)
GLUCOSE SERPL-MCNC: 103 MG/DL (ref 65–99)
HCT VFR BLD AUTO: 44.8 % (ref 37–47)
HGB BLD-MCNC: 14.5 G/DL (ref 12–16)
MCH RBC QN AUTO: 29.5 PG (ref 27–33)
MCHC RBC AUTO-ENTMCNC: 32.4 G/DL (ref 33.6–35)
MCV RBC AUTO: 91.2 FL (ref 81.4–97.8)
PLATELET # BLD AUTO: 326 K/UL (ref 164–446)
PMV BLD AUTO: 10.2 FL (ref 9–12.9)
POTASSIUM SERPL-SCNC: 4.6 MMOL/L (ref 3.6–5.5)
RBC # BLD AUTO: 4.91 M/UL (ref 4.2–5.4)
SODIUM SERPL-SCNC: 139 MMOL/L (ref 135–145)
WBC # BLD AUTO: 11.2 K/UL (ref 4.8–10.8)

## 2018-11-02 PROCEDURE — 85027 COMPLETE CBC AUTOMATED: CPT

## 2018-11-02 PROCEDURE — 36415 COLL VENOUS BLD VENIPUNCTURE: CPT

## 2018-11-02 PROCEDURE — 93005 ELECTROCARDIOGRAM TRACING: CPT

## 2018-11-02 PROCEDURE — 93010 ELECTROCARDIOGRAM REPORT: CPT | Performed by: INTERNAL MEDICINE

## 2018-11-02 PROCEDURE — 80048 BASIC METABOLIC PNL TOTAL CA: CPT

## 2018-11-05 ENCOUNTER — HOSPITAL ENCOUNTER (OUTPATIENT)
Facility: MEDICAL CENTER | Age: 48
End: 2018-11-05
Attending: SURGERY | Admitting: SURGERY
Payer: COMMERCIAL

## 2018-11-05 VITALS
TEMPERATURE: 97.4 F | HEART RATE: 68 BPM | RESPIRATION RATE: 15 BRPM | DIASTOLIC BLOOD PRESSURE: 84 MMHG | SYSTOLIC BLOOD PRESSURE: 138 MMHG | OXYGEN SATURATION: 93 % | HEIGHT: 65 IN | WEIGHT: 275.8 LBS | BODY MASS INDEX: 45.95 KG/M2

## 2018-11-05 DIAGNOSIS — G89.18 POSTOPERATIVE PAIN: ICD-10-CM

## 2018-11-05 PROCEDURE — 160025 RECOVERY II MINUTES (STATS): Performed by: SURGERY

## 2018-11-05 PROCEDURE — 700111 HCHG RX REV CODE 636 W/ 250 OVERRIDE (IP)

## 2018-11-05 PROCEDURE — 160046 HCHG PACU - 1ST 60 MINS PHASE II: Performed by: SURGERY

## 2018-11-05 PROCEDURE — 160035 HCHG PACU - 1ST 60 MINS PHASE I: Performed by: SURGERY

## 2018-11-05 PROCEDURE — 502570 HCHG PACK, GASTRIC BANDING: Performed by: SURGERY

## 2018-11-05 PROCEDURE — 500868 HCHG NEEDLE, SURGI(VARES): Performed by: SURGERY

## 2018-11-05 PROCEDURE — 500521 HCHG ENDOSTITCH LOAD UNIT: Performed by: SURGERY

## 2018-11-05 PROCEDURE — 700102 HCHG RX REV CODE 250 W/ 637 OVERRIDE(OP): Performed by: ANESTHESIOLOGY

## 2018-11-05 PROCEDURE — 160009 HCHG ANES TIME/MIN: Performed by: SURGERY

## 2018-11-05 PROCEDURE — 500800 HCHG LAPAROSCOPIC J/L HOOK: Performed by: SURGERY

## 2018-11-05 PROCEDURE — A6402 STERILE GAUZE <= 16 SQ IN: HCPCS | Performed by: SURGERY

## 2018-11-05 PROCEDURE — 501583 HCHG TROCAR, THRD CAN&SEAL 5X100: Performed by: SURGERY

## 2018-11-05 PROCEDURE — 501570 HCHG TROCAR, SEPARATOR: Performed by: SURGERY

## 2018-11-05 PROCEDURE — 160041 HCHG SURGERY MINUTES - EA ADDL 1 MIN LEVEL 4: Performed by: SURGERY

## 2018-11-05 PROCEDURE — 160002 HCHG RECOVERY MINUTES (STAT): Performed by: SURGERY

## 2018-11-05 PROCEDURE — 500522 HCHG ENDOSTITCH SUTURING DEVICE: Performed by: SURGERY

## 2018-11-05 PROCEDURE — 700101 HCHG RX REV CODE 250

## 2018-11-05 PROCEDURE — 700111 HCHG RX REV CODE 636 W/ 250 OVERRIDE (IP): Performed by: ANESTHESIOLOGY

## 2018-11-05 PROCEDURE — 160048 HCHG OR STATISTICAL LEVEL 1-5: Performed by: SURGERY

## 2018-11-05 PROCEDURE — 160029 HCHG SURGERY MINUTES - 1ST 30 MINS LEVEL 4: Performed by: SURGERY

## 2018-11-05 PROCEDURE — 501838 HCHG SUTURE GENERAL: Performed by: SURGERY

## 2018-11-05 PROCEDURE — A9270 NON-COVERED ITEM OR SERVICE: HCPCS | Performed by: ANESTHESIOLOGY

## 2018-11-05 RX ORDER — DIPHENHYDRAMINE HYDROCHLORIDE 50 MG/ML
12.5 INJECTION INTRAMUSCULAR; INTRAVENOUS
Status: DISCONTINUED | OUTPATIENT
Start: 2018-11-05 | End: 2018-11-05 | Stop reason: HOSPADM

## 2018-11-05 RX ORDER — MEPERIDINE HYDROCHLORIDE 25 MG/ML
12.5 INJECTION INTRAMUSCULAR; INTRAVENOUS; SUBCUTANEOUS
Status: DISCONTINUED | OUTPATIENT
Start: 2018-11-05 | End: 2018-11-05 | Stop reason: HOSPADM

## 2018-11-05 RX ORDER — HALOPERIDOL 5 MG/ML
1 INJECTION INTRAMUSCULAR
Status: DISCONTINUED | OUTPATIENT
Start: 2018-11-05 | End: 2018-11-05 | Stop reason: HOSPADM

## 2018-11-05 RX ORDER — OXYCODONE HCL 5 MG/5 ML
5 SOLUTION, ORAL ORAL
Status: COMPLETED | OUTPATIENT
Start: 2018-11-05 | End: 2018-11-05

## 2018-11-05 RX ORDER — ONDANSETRON 2 MG/ML
4 INJECTION INTRAMUSCULAR; INTRAVENOUS
Status: DISCONTINUED | OUTPATIENT
Start: 2018-11-05 | End: 2018-11-05 | Stop reason: HOSPADM

## 2018-11-05 RX ORDER — LIDOCAINE HYDROCHLORIDE 10 MG/ML
INJECTION, SOLUTION INFILTRATION; PERINEURAL
Status: COMPLETED
Start: 2018-11-05 | End: 2018-11-05

## 2018-11-05 RX ORDER — SODIUM CHLORIDE, SODIUM LACTATE, POTASSIUM CHLORIDE, CALCIUM CHLORIDE 600; 310; 30; 20 MG/100ML; MG/100ML; MG/100ML; MG/100ML
INJECTION, SOLUTION INTRAVENOUS ONCE
Status: COMPLETED | OUTPATIENT
Start: 2018-11-05 | End: 2018-11-05

## 2018-11-05 RX ORDER — OXYCODONE HCL 5 MG/5 ML
10 SOLUTION, ORAL ORAL
Status: COMPLETED | OUTPATIENT
Start: 2018-11-05 | End: 2018-11-05

## 2018-11-05 RX ORDER — HYDROCODONE BITARTRATE AND ACETAMINOPHEN 2.5; 108 MG/5ML; MG/5ML
10-20 SOLUTION ORAL EVERY 6 HOURS PRN
Qty: 150 ML | Refills: 0 | Status: SHIPPED | OUTPATIENT
Start: 2018-11-05 | End: 2018-11-08

## 2018-11-05 RX ORDER — LABETALOL HYDROCHLORIDE 5 MG/ML
5 INJECTION, SOLUTION INTRAVENOUS
Status: DISCONTINUED | OUTPATIENT
Start: 2018-11-05 | End: 2018-11-05 | Stop reason: HOSPADM

## 2018-11-05 RX ORDER — OXYCODONE HYDROCHLORIDE 5 MG/1
10 TABLET ORAL
Status: DISCONTINUED | OUTPATIENT
Start: 2018-11-05 | End: 2018-11-05 | Stop reason: HOSPADM

## 2018-11-05 RX ORDER — PAROXETINE HYDROCHLORIDE 20 MG/1
20 TABLET, FILM COATED ORAL EVERY EVENING
COMMUNITY
End: 2019-04-26 | Stop reason: SDUPTHER

## 2018-11-05 RX ORDER — BUPIVACAINE HYDROCHLORIDE AND EPINEPHRINE 5; 5 MG/ML; UG/ML
INJECTION, SOLUTION PERINEURAL
Status: DISCONTINUED | OUTPATIENT
Start: 2018-11-05 | End: 2018-11-05 | Stop reason: HOSPADM

## 2018-11-05 RX ORDER — HYDROMORPHONE HYDROCHLORIDE 2 MG/ML
0.4 INJECTION, SOLUTION INTRAMUSCULAR; INTRAVENOUS; SUBCUTANEOUS
Status: DISCONTINUED | OUTPATIENT
Start: 2018-11-05 | End: 2018-11-05 | Stop reason: HOSPADM

## 2018-11-05 RX ORDER — SODIUM CHLORIDE, SODIUM LACTATE, POTASSIUM CHLORIDE, CALCIUM CHLORIDE 600; 310; 30; 20 MG/100ML; MG/100ML; MG/100ML; MG/100ML
INJECTION, SOLUTION INTRAVENOUS CONTINUOUS
Status: DISCONTINUED | OUTPATIENT
Start: 2018-11-05 | End: 2018-11-05 | Stop reason: HOSPADM

## 2018-11-05 RX ORDER — HYDROMORPHONE HYDROCHLORIDE 2 MG/ML
0.2 INJECTION, SOLUTION INTRAMUSCULAR; INTRAVENOUS; SUBCUTANEOUS
Status: DISCONTINUED | OUTPATIENT
Start: 2018-11-05 | End: 2018-11-05 | Stop reason: HOSPADM

## 2018-11-05 RX ORDER — HYDROMORPHONE HYDROCHLORIDE 2 MG/ML
0.1 INJECTION, SOLUTION INTRAMUSCULAR; INTRAVENOUS; SUBCUTANEOUS
Status: DISCONTINUED | OUTPATIENT
Start: 2018-11-05 | End: 2018-11-05 | Stop reason: HOSPADM

## 2018-11-05 RX ORDER — OXYCODONE HYDROCHLORIDE 5 MG/1
5 TABLET ORAL
Status: DISCONTINUED | OUTPATIENT
Start: 2018-11-05 | End: 2018-11-05 | Stop reason: HOSPADM

## 2018-11-05 RX ORDER — HYDRALAZINE HYDROCHLORIDE 20 MG/ML
5 INJECTION INTRAMUSCULAR; INTRAVENOUS
Status: DISCONTINUED | OUTPATIENT
Start: 2018-11-05 | End: 2018-11-05 | Stop reason: HOSPADM

## 2018-11-05 RX ORDER — MIDAZOLAM HYDROCHLORIDE 1 MG/ML
1 INJECTION INTRAMUSCULAR; INTRAVENOUS
Status: DISCONTINUED | OUTPATIENT
Start: 2018-11-05 | End: 2018-11-05 | Stop reason: HOSPADM

## 2018-11-05 RX ADMIN — SODIUM CHLORIDE, SODIUM LACTATE, POTASSIUM CHLORIDE, CALCIUM CHLORIDE: 600; 310; 30; 20 INJECTION, SOLUTION INTRAVENOUS at 13:15

## 2018-11-05 RX ADMIN — FENTANYL CITRATE 50 MCG: 50 INJECTION, SOLUTION INTRAMUSCULAR; INTRAVENOUS at 15:34

## 2018-11-05 RX ADMIN — LIDOCAINE HYDROCHLORIDE 0.5 ML: 10 INJECTION, SOLUTION INFILTRATION; PERINEURAL at 13:15

## 2018-11-05 RX ADMIN — OXYCODONE HYDROCHLORIDE 10 MG: 5 SOLUTION ORAL at 15:34

## 2018-11-05 RX ADMIN — Medication 0.5 ML: at 13:15

## 2018-11-05 ASSESSMENT — PAIN SCALES - GENERAL
PAINLEVEL_OUTOF10: 2
PAINLEVEL_OUTOF10: 0
PAINLEVEL_OUTOF10: 2
PAINLEVEL_OUTOF10: 5
PAINLEVEL_OUTOF10: 2
PAINLEVEL_OUTOF10: 2

## 2018-11-05 NOTE — OR SURGEON
Immediate Post OP Note    PreOp Diagnosis: Slipped lap band    PostOp Diagnosis: Same    Procedure(s):  LAPAROSCOPIC BAND REMOVAL- GASTRIC BAND and Port - Wound Class: Clean Contaminated    Surgeon(s):  John H Ganser, M.D.    Anesthesiologist/Type of Anesthesia:  Anesthesiologist: Ronald Henriquez M.D./General    Surgical Staff:  Assistant: TERRELL Mohr  Circulator: Anne Upton R.N.  Scrub Person: Kirsty Sheth; Marian Jolly    Specimens removed if any:  * No specimens in log *    Estimated Blood Loss: 0    Findings: 0    Complications: 0        11/5/2018 3:09 PM John H Ganser, M.D.

## 2018-11-05 NOTE — OP REPORT
DATE OF SERVICE:  11/05/2018    PREOPERATIVE DIAGNOSIS:  Slipped lap band.    POSTOPERATIVE DIAGNOSIS:  Slipped lap band.    PROCEDURE PERFORMED:  Laparoscopic removal of lap band and subcutaneous port.    SURGEON:  John H. Ganser, MD    ASSISTANT:  Balaji Araiza PA-C    ANESTHESIA:  General.    ANESTHESIOLOGIST:  Ronald Henriquez MD    INDICATIONS:  The patient is a 48-year-old female with remote history of lap   band surgery.  She did well over the time, but has developed significant   dysphagia and regurgitation.  Upper GI shows slipped lap band.  She has   symptomatically been improved since fluid was removed, but is still having some dysphagia.    Risks, benefits, and alternatives to removal of the band and port were   outlined in detail.  All questions answered and she wished to proceed.    DESCRIPTION OF PROCEDURE:  The patient was identified and general anesthetic   administered.  Her abdomen was prepped and draped in the usual sterile   fashion.  Local anesthesia of 0.5% Marcaine with epinephrine was injected   prior to making skin incision.  Small incision was made at the prior   laparoscopic site in the midepigastric region and the Veress needle passed.    The abdomen was insufflated with carbon dioxide without incident and a 5-mm   blunt trocar and 5-mm 30-degree scope inserted.  Tanja liver retractor was   passed through a small subxiphoid incision, used to elevate the lateral   segment of liver and was held with the robotic arm.  Epigastric 5, left upper   quadrant 15, left lateral subcostal 5 mm trocars were placed, all in prior   laparoscopic sites.  The band tubing was cut at the fascia.  Scar capsule   overlying the band on the stomach was incised with cautery and the band   unbuckled.  It was then able to be removed from around the stomach.  Anterior   portion of the scar capsule was then able to be removed with cautery, opening   the stomach was nicely.  An orogastric tube was able to be passed  by   anesthesia into the stomach and decompress it further.  Hemostasis was   assured.  Liver retractor and trocars were withdrawn, the abdomen was   deflated.    The scar capsule around the port was then incised with cautery at the base of   the 15 mm trocar site.  The port was then able to be removed from the fascia   including its sutures.  Anterior portion of the scar capsule was removed with   the port as well leaving the posterior scar capsule and muscle.  Hemostasis   was assured.  Subcutaneous tissues were approximated with 3-0 Vicryl and skin   incisions closed with 4-0 Vicryl subcuticular sutures.  Sterile dressings were   applied.  The patient returned to recovery room in stable condition.       ____________________________________     JOHN H. GANSER, MD [facility] / [facility]    DD:  11/05/2018 15:13:15  DT:  11/05/2018 15:29:37    D#:  [id number]  Job#:  [id number]    cc: ANDREE ROSALES DO

## 2018-11-06 NOTE — OR NURSING
Patient arrived in phase 2 reporting tolerable pain level of 2/10 on pain scale. Patient reports no n/v and is motivated for discharge. Patient's family is at bedside. 1700 Patient felt slight nausea when stood up to dress, but it passed quickly. Discharge instructions reviewed, patient and  verbalized understanding. Patient voided at 1715. Patient discharged via wheelchair with belongings in her possession, escorted by CNA.

## 2018-11-06 NOTE — DISCHARGE INSTRUCTIONS
ACTIVITY: Rest and take it easy for the first 24 hours.  A responsible adult is recommended to remain with you during that time.  It is normal to feel sleepy.  We encourage you to not do anything that requires balance, judgment or coordination.    MILD FLU-LIKE SYMPTOMS ARE NORMAL. YOU MAY EXPERIENCE GENERALIZED MUSCLE ACHES, THROAT IRRITATION, HEADACHE AND/OR SOME NAUSEA.    FOR 24 HOURS DO NOT:  Drive, operate machinery or run household appliances.  Drink beer or alcoholic beverages.   Make important decisions or sign legal documents.    SPECIAL INSTRUCTIONS: Follow instructions given to you by MD. Call MD with any questions.    DIET: To avoid nausea, slowly advance diet as tolerated, avoiding spicy or greasy foods for the first day.  Add more substantial food to your diet according to your physician's instructions.  Babies can be fed formula or breast milk as soon as they are hungry.  INCREASE FLUIDS AND FIBER TO AVOID CONSTIPATION.    SURGICAL DRESSING/BATHING: Follow instructions given by MD    FOLLOW-UP APPOINTMENT:  A follow-up appointment should be arranged with your doctor in 1-2 weeks; call to schedule.    You should CALL YOUR PHYSICIAN if you develop:  Fever greater than 101 degrees F.  Pain not relieved by medication, or persistent nausea or vomiting.  Excessive bleeding (blood soaking through dressing) or unexpected drainage from the wound.  Extreme redness or swelling around the incision site, drainage of pus or foul smelling drainage.  Inability to urinate or empty your bladder within 8 hours.  Problems with breathing or chest pain.    You should call 911 if you develop problems with breathing or chest pain.  If you are unable to contact your doctor or surgical center, you should go to the nearest emergency room or urgent care center.  Physician's telephone #: Dr. Ganser: [phone]    If any questions arise, call your doctor.  If your doctor is not available, please feel free to call the Surgical  Center at [phone].  The Center is open Monday through Friday from 7AM to 7PM.  You can also call the HEALTH HOTLINE open 24 hours/day, 7 days/week and speak to a nurse at [phone], or toll free at [phone].    A registered nurse may call you a few days after your surgery to see how you are doing after your procedure.    MEDICATIONS: Resume taking daily medication.  Take prescribed pain medication with food.  If no medication is prescribed, you may take non-aspirin pain medication if needed.  PAIN MEDICATION CAN BE VERY CONSTIPATING.  Take a stool softener or laxative such as senokot, pericolace, or milk of magnesia if needed.    Prescription given for Hycet (pain).  Last pain medication given at 3:34pm, next available dose at approximately 9:30pm.    If your physician has prescribed pain medication that includes Acetaminophen (Tylenol), do not take additional Acetaminophen (Tylenol) while taking the prescribed medication.    Depression / Suicide Risk    As you are discharged from this [facility] facility, it is important to learn how to keep safe from harming yourself.    Recognize the warning signs:  · Abrupt changes in personality, positive or negative- including increase in energy   · Giving away possessions  · Change in eating patterns- significant weight changes-  positive or negative  · Change in sleeping patterns- unable to sleep or sleeping all the time   · Unwillingness or inability to communicate  · Depression  · Unusual sadness, discouragement and loneliness  · Talk of wanting to die  · Neglect of personal appearance   · Rebelliousness- reckless behavior  · Withdrawal from people/activities they love  · Confusion- inability to concentrate     If you or a loved one observes any of these behaviors or has concerns about self-harm, here's what you can do:  · Talk about it- your feelings and reasons for harming yourself  · Remove any means that you might use to hurt yourself (examples:  pills, rope, extension cords, firearm)  · Get professional help from the community (Mental Health, Substance Abuse, psychological counseling)  · Do not be alone:Call your Safe Contact- someone whom you trust who will be there for you.  · Call your local CRISIS HOTLINE 349-7819 or 881-978-9190  · Call your local Children's Mobile Crisis Response Team [place] (336) 582-4956 or www.[employer]  · Call the toll free National Suicide Prevention Hotlines   · National Suicide Prevention Lifeline 943-882-BRSR (0720)  · National Hope Line Network 800-SUICIDE (621-6044)

## 2018-11-06 NOTE — OR NURSING
The pt is awake and oriented. Respirations are regular and easy. Pain is controlled, the pt is comfortable. 5 lap sites across abdomen dry and intact.

## 2019-01-05 ENCOUNTER — OFFICE VISIT (OUTPATIENT)
Dept: URGENT CARE | Facility: PHYSICIAN GROUP | Age: 49
End: 2019-01-05
Payer: COMMERCIAL

## 2019-01-05 VITALS
BODY MASS INDEX: 45.82 KG/M2 | DIASTOLIC BLOOD PRESSURE: 90 MMHG | WEIGHT: 275 LBS | TEMPERATURE: 98.2 F | HEART RATE: 91 BPM | HEIGHT: 65 IN | SYSTOLIC BLOOD PRESSURE: 140 MMHG | RESPIRATION RATE: 14 BRPM | OXYGEN SATURATION: 95 %

## 2019-01-05 DIAGNOSIS — S03.00XA DISLOCATION OF TEMPOROMANDIBULAR JOINT, INITIAL ENCOUNTER: ICD-10-CM

## 2019-01-05 PROCEDURE — 99214 OFFICE O/P EST MOD 30 MIN: CPT | Performed by: FAMILY MEDICINE

## 2019-01-05 RX ORDER — IBUPROFEN 800 MG/1
800 TABLET ORAL EVERY 8 HOURS PRN
Qty: 30 TAB | Refills: 0 | Status: SHIPPED | OUTPATIENT
Start: 2019-01-05 | End: 2019-02-04

## 2019-01-05 NOTE — PROGRESS NOTES
"Chief Complaint   Patient presents with   • Jaw Pain               HPI:        C/o dull, achy rt jaw pain x 1 d.   States pain is dull, achy but no worse with biting down.   She has a hx of TMJ.   States pain is constant, but mild.   NO FEVER.   Denies ear pain or tinnitus.       Past Medical History:   Diagnosis Date   • Anemia    • Anesthesia     ponv   • Arthritis     knees/neck/elbows   • Benign hypertension 7/20/2012   • Heart burn     abdominal   • Morbid obesity (HCC) 5/31/2016   • Nonspecific abnormal electrocardiogram (ECG) (EKG) 7/20/2012   • Preop cardiovascular exam 7/20/2012   • S/P gastric surgery 4/18/2016    [facility]    • Vitamin D deficiency disease 12/2/2016     Social History   Substance Use Topics   • Smoking status: Never Smoker   • Smokeless tobacco: Never Used   • Alcohol use No               Review of Systems   Constitutional: Negative for fever, chills and malaise/fatigue.   Eyes: Negative for vision changes, d/c.    Respiratory: Negative for cough and sputum production.    Cardiovascular: Negative for chest pain and palpitations.   Gastrointestinal: Negative for nausea, vomiting, abdominal pain, diarrhea and constipation.   Genitourinary: Negative for dysuria, urgency and frequency.   Skin: Negative for rash or  itching.   Neurological: Negative for dizziness and tingling.   Psychiatric/Behavioral: Negative for depression.   Hematologic/lymphatic - denies bruising or excessive bleeding  All other systems reviewed and are negative.      OBJECTIVE  /90   Pulse 91   Temp 36.8 °C (98.2 °F) (Temporal)   Resp 14   Ht 1.651 m (5' 5\")   Wt 124.7 kg (275 lb)   LMP 03/01/2016   SpO2 95%   BMI 45.76 kg/m²   HEENT - PERRLA, EOMI  +TTP over right TMJ.    No dental abscess or oral cavity lesions.   No parotid tenderness or nodules.   Lymph - no cervical , auricular, submental LAD  Neuro - alert and oriented x3. CN 2-12 grossly intact.  Lungs - CTA. No wheezes, rhonchi or " rales.  Heart - regular rate and rhythm without murmur.  Abdomen - soft and non-tender, bowel sounds active x4.  Musculoskeletal - No lower extremity edema noted.  Nuzhat's sign negative bilaterally        A/P:    1. Pain of temporomandibular joint, initial encounter     - ibuprofen (MOTRIN) 800 MG Tab; Take 1 Tab by mouth every 8 hours as needed.  Dispense: 30 Tab; Refill: 0       Follow up in one week if no improvement, sooner if symptoms worsen.

## 2019-01-16 ENCOUNTER — APPOINTMENT (RX ONLY)
Dept: URBAN - METROPOLITAN AREA CLINIC 22 | Facility: CLINIC | Age: 49
Setting detail: DERMATOLOGY
End: 2019-01-16

## 2019-01-16 DIAGNOSIS — Z71.89 OTHER SPECIFIED COUNSELING: ICD-10-CM

## 2019-01-16 DIAGNOSIS — L73.8 OTHER SPECIFIED FOLLICULAR DISORDERS: ICD-10-CM

## 2019-01-16 DIAGNOSIS — L81.4 OTHER MELANIN HYPERPIGMENTATION: ICD-10-CM

## 2019-01-16 DIAGNOSIS — L82.1 OTHER SEBORRHEIC KERATOSIS: ICD-10-CM

## 2019-01-16 DIAGNOSIS — Q819 OTHER SPECIFIED ANOMALIES OF SKIN: ICD-10-CM

## 2019-01-16 DIAGNOSIS — Q826 OTHER SPECIFIED ANOMALIES OF SKIN: ICD-10-CM

## 2019-01-16 DIAGNOSIS — Q828 OTHER SPECIFIED ANOMALIES OF SKIN: ICD-10-CM

## 2019-01-16 DIAGNOSIS — D22 MELANOCYTIC NEVI: ICD-10-CM

## 2019-01-16 DIAGNOSIS — D18.0 HEMANGIOMA: ICD-10-CM

## 2019-01-16 PROBLEM — D48.5 NEOPLASM OF UNCERTAIN BEHAVIOR OF SKIN: Status: ACTIVE | Noted: 2019-01-16

## 2019-01-16 PROBLEM — D22.5 MELANOCYTIC NEVI OF TRUNK: Status: ACTIVE | Noted: 2019-01-16

## 2019-01-16 PROBLEM — D22.62 MELANOCYTIC NEVI OF LEFT UPPER LIMB, INCLUDING SHOULDER: Status: ACTIVE | Noted: 2019-01-16

## 2019-01-16 PROBLEM — Q82.8 OTHER SPECIFIED CONGENITAL MALFORMATIONS OF SKIN: Status: ACTIVE | Noted: 2019-01-16

## 2019-01-16 PROBLEM — D22.61 MELANOCYTIC NEVI OF RIGHT UPPER LIMB, INCLUDING SHOULDER: Status: ACTIVE | Noted: 2019-01-16

## 2019-01-16 PROBLEM — D18.01 HEMANGIOMA OF SKIN AND SUBCUTANEOUS TISSUE: Status: ACTIVE | Noted: 2019-01-16

## 2019-01-16 PROCEDURE — ? TREATMENT REGIMEN

## 2019-01-16 PROCEDURE — ? BIOPSY BY SHAVE METHOD

## 2019-01-16 PROCEDURE — ? COUNSELING

## 2019-01-16 PROCEDURE — 11102 TANGNTL BX SKIN SINGLE LES: CPT

## 2019-01-16 PROCEDURE — 99203 OFFICE O/P NEW LOW 30 MIN: CPT | Mod: 25

## 2019-01-16 ASSESSMENT — LOCATION SIMPLE DESCRIPTION DERM
LOCATION SIMPLE: LEFT FOREHEAD
LOCATION SIMPLE: LEFT POSTERIOR UPPER ARM
LOCATION SIMPLE: RIGHT UPPER BACK
LOCATION SIMPLE: LEFT FOREARM
LOCATION SIMPLE: RIGHT FOREARM
LOCATION SIMPLE: RIGHT LOWER BACK
LOCATION SIMPLE: RIGHT POSTERIOR UPPER ARM
LOCATION SIMPLE: ABDOMEN

## 2019-01-16 ASSESSMENT — LOCATION DETAILED DESCRIPTION DERM
LOCATION DETAILED: LEFT PROXIMAL POSTERIOR UPPER ARM
LOCATION DETAILED: LEFT DISTAL DORSAL FOREARM
LOCATION DETAILED: LEFT INFERIOR MEDIAL FOREHEAD
LOCATION DETAILED: LEFT PROXIMAL DORSAL FOREARM
LOCATION DETAILED: RIGHT SUPERIOR MEDIAL MIDBACK
LOCATION DETAILED: EPIGASTRIC SKIN
LOCATION DETAILED: RIGHT SUPERIOR UPPER BACK
LOCATION DETAILED: RIGHT PROXIMAL POSTERIOR UPPER ARM
LOCATION DETAILED: RIGHT MEDIAL UPPER BACK
LOCATION DETAILED: RIGHT PROXIMAL DORSAL FOREARM

## 2019-01-16 ASSESSMENT — LOCATION ZONE DERM
LOCATION ZONE: ARM
LOCATION ZONE: TRUNK
LOCATION ZONE: FACE

## 2019-01-16 NOTE — PROCEDURE: BIOPSY BY SHAVE METHOD
Post-Care Instructions: I reviewed with the patient in detail post-care instructions. Patient is to keep the biopsy site dry overnight, and then apply bacitracin twice daily until healed. Patient may apply hydrogen peroxide soaks to remove any crusting.
Was A Bandage Applied: Yes
Anesthesia Volume In Cc: 1
Electrodesiccation And Curettage Text: The wound bed was treated with electrodesiccation and curettage after the biopsy was performed.
Dressing: bandage
Biopsy Method: Personna blade
Bill For Surgical Tray: no
Lab: [id number]
Silver Nitrate Text: The wound bed was treated with silver nitrate after the biopsy was performed.
Hemostasis: Drysol
Notification Instructions: Patient will be notified of biopsy results. However, patient instructed to call the office if not contacted within 2 weeks.
Detail Level: Detailed
Curettage Text: The wound bed was treated with curettage after the biopsy was performed.
Anesthesia Type: 1% lidocaine with 1:100,000 epinephrine and a 1:3 solution of 8.4% sodium bicarbonate
Size Of Lesion In Cm: 0
Lab Facility: 
Wound Care: Vaseline
Consent: Written consent was obtained and risks were reviewed including but not limited to scarring, infection, bleeding, scabbing, incomplete removal, nerve damage and allergy to anesthesia.
Cryotherapy Text: The wound bed was treated with cryotherapy after the biopsy was performed.
Depth Of Biopsy: dermis
Type Of Destruction Used: Curettage
Billing Type: Third-Party Bill
Biopsy Type: H and E
Electrodesiccation Text: The wound bed was treated with electrodesiccation after the biopsy was performed.

## 2019-02-04 ENCOUNTER — OFFICE VISIT (OUTPATIENT)
Dept: URGENT CARE | Facility: PHYSICIAN GROUP | Age: 49
End: 2019-02-04
Payer: COMMERCIAL

## 2019-02-04 VITALS
DIASTOLIC BLOOD PRESSURE: 80 MMHG | BODY MASS INDEX: 45.82 KG/M2 | SYSTOLIC BLOOD PRESSURE: 132 MMHG | HEART RATE: 94 BPM | WEIGHT: 275 LBS | TEMPERATURE: 99.4 F | HEIGHT: 65 IN | OXYGEN SATURATION: 93 % | RESPIRATION RATE: 20 BRPM

## 2019-02-04 DIAGNOSIS — J02.9 VIRAL PHARYNGITIS: ICD-10-CM

## 2019-02-04 DIAGNOSIS — J06.9 VIRAL UPPER RESPIRATORY INFECTION: ICD-10-CM

## 2019-02-04 LAB
INT CON NEG: NEGATIVE
INT CON POS: POSITIVE
S PYO AG THROAT QL: NEGATIVE

## 2019-02-04 PROCEDURE — 99214 OFFICE O/P EST MOD 30 MIN: CPT | Performed by: PHYSICIAN ASSISTANT

## 2019-02-04 PROCEDURE — 87880 STREP A ASSAY W/OPTIC: CPT | Performed by: PHYSICIAN ASSISTANT

## 2019-02-04 RX ORDER — DEXTROMETHORPHAN HYDROBROMIDE AND PROMETHAZINE HYDROCHLORIDE 15; 6.25 MG/5ML; MG/5ML
5 SYRUP ORAL 4 TIMES DAILY PRN
Qty: 140 ML | Refills: 0 | Status: SHIPPED | OUTPATIENT
Start: 2019-02-04 | End: 2020-02-10

## 2019-02-04 ASSESSMENT — ENCOUNTER SYMPTOMS
SPUTUM PRODUCTION: 1
DIARRHEA: 0
COUGH: 1
VOMITING: 0
EYE DISCHARGE: 0
FLANK PAIN: 0
EYE PAIN: 0
CHILLS: 0
ABDOMINAL PAIN: 0
HEADACHES: 0
SHORTNESS OF BREATH: 0
DIZZINESS: 0
SORE THROAT: 1
CONSTIPATION: 0
MYALGIAS: 0
SINUS PAIN: 0
HEMOPTYSIS: 0
NAUSEA: 0

## 2019-02-04 NOTE — PROGRESS NOTES
Subjective:   Logan Mahoney is a 48 y.o. female who presents for Cough (x 1 day// congestion// sore throat// fever last night)       Patient presents today with productive cough, congestion, sore throat, subjective fever starting yesterday. She is an , so she is concerned about strep.       Cough   This is a new problem. The current episode started yesterday. The problem has been gradually worsening. The problem occurs every few minutes. The cough is productive of sputum. Associated symptoms include ear pain, nasal congestion and a sore throat. Pertinent negatives include no chest pain, chills, ear congestion, headaches, hemoptysis, myalgias or shortness of breath. Nothing aggravates the symptoms. She has tried nothing for the symptoms. The treatment provided no relief.     Review of Systems   Constitutional: Negative for chills.        Positive for subjective fever   HENT: Positive for congestion, ear pain and sore throat. Negative for ear discharge and sinus pain.    Eyes: Negative for pain and discharge.   Respiratory: Positive for cough and sputum production. Negative for hemoptysis and shortness of breath.    Cardiovascular: Negative for chest pain.   Gastrointestinal: Negative for abdominal pain, constipation, diarrhea, nausea and vomiting.   Genitourinary: Negative for dysuria, flank pain, frequency and urgency.   Musculoskeletal: Negative for joint pain and myalgias.   Neurological: Negative for dizziness and headaches.   All other systems reviewed and are negative.      PMH:  has a past medical history of Anemia; Anesthesia; Arthritis; Benign hypertension (7/20/2012); Heart burn; Morbid obesity (HCC) (5/31/2016); Nonspecific abnormal electrocardiogram (ECG) (EKG) (7/20/2012); Preop cardiovascular exam (7/20/2012); S/P gastric surgery (4/18/2016); and Vitamin D deficiency disease (12/2/2016).    MEDS:   Current Outpatient Prescriptions:   •  lidocaine viscous 2% (XYLOCAINE) 2  "% Solution, Take 15 mL by mouth every 6 hours as needed for Throat/Mouth Pain for up to 5 days., Disp: 300 mL, Rfl: 0  •  promethazine-dextromethorphan (PROMETHAZINE-DM) 6.25-15 MG/5ML syrup, Take 5 mL by mouth 4 times a day as needed for Cough., Disp: 140 mL, Rfl: 0  •  PARoxetine (PAXIL) 20 MG Tab, Take 20 mg by mouth every evening., Disp: , Rfl:   •  multivitamin (THERAGRAN) Tab, Take 1 Tab by mouth every day., Disp: , Rfl:   •  carvedilol (COREG) 6.25 MG Tab, TAKE 1 TABLET BY MOUTH TWICE DAILY WITH MEALS, Disp: 180 Tab, Rfl: 3  •  vitamin D, Ergocalciferol, (DRISDOL) 88212 UNIT CAPS capsule, Take 1,000 Units by mouth every evening., Disp: , Rfl:     ALLERGIES:   Allergies   Allergen Reactions   • Ceclor [Cefaclor] Vomiting   • Cinnamon Vomiting   • Eggs Vomiting   • Latex Hives   • Peppers [Pepper-Bell Food Allergy] Vomiting   • Pineapple Hives and Nausea     Only skin       SURGHX:   Past Surgical History:   Procedure Laterality Date   • LAPAROSCOPIC BAND REMOVAL  11/5/2018    Procedure: LAPAROSCOPIC BAND REMOVAL- GASTRIC BAND and Port;  Surgeon: John H Ganser, M.D.;  Location: SURGERY [facility];  Service: General   • OTHER ABDOMINAL SURGERY  2013    gastric band w/port   • KNEE ARTHROSCOPY  1990    Arthroscopy, Knee R   • TONSILLECTOMY         SOCHX:  reports that she has never smoked. She has never used smokeless tobacco. She reports that she does not drink alcohol or use drugs.    FH: Reviewed with patient, not pertinent to this visit.     Objective:   /80   Pulse 94   Temp 37.4 °C (99.4 °F) (Temporal)   Resp 20   Ht 1.651 m (5' 5\")   Wt 124.7 kg (275 lb)   LMP 03/01/2016   SpO2 93%   BMI 45.76 kg/m²   Physical Exam   Constitutional: She is oriented to person, place, and time. She appears well-developed and well-nourished. No distress.   HENT:   Head: Normocephalic and atraumatic.   Right Ear: Tympanic membrane, external ear and ear canal normal.   Left Ear: Tympanic membrane, external " ear and ear canal normal.   Nose: Mucosal edema and rhinorrhea present. No sinus tenderness. Right sinus exhibits no maxillary sinus tenderness and no frontal sinus tenderness. Left sinus exhibits no maxillary sinus tenderness and no frontal sinus tenderness.   Mouth/Throat: Uvula is midline and mucous membranes are normal. Posterior oropharyngeal edema and posterior oropharyngeal erythema present.   Eyes: Conjunctivae and EOM are normal.   Neck: Normal range of motion. Neck supple. No tracheal deviation present.   Cardiovascular: Normal rate, regular rhythm and normal heart sounds.    Pulmonary/Chest: Effort normal and breath sounds normal. No respiratory distress. She has no wheezes. She has no rhonchi. She has no rales.   Musculoskeletal:   ROM normal all four extremities   Lymphadenopathy:     She has cervical adenopathy.   Neurological: She is alert and oriented to person, place, and time.   Skin: Skin is warm and dry.   Psychiatric: She has a normal mood and affect. Her behavior is normal. Judgment and thought content normal.     - POCT Rapid Strep A: negative    Assessment/Plan:   1. Viral pharyngitis  - POCT Rapid Strep A  - lidocaine viscous 2% (XYLOCAINE) 2 % Solution; Take 15 mL by mouth every 6 hours as needed for Throat/Mouth Pain for up to 5 days.  Dispense: 300 mL; Refill: 0    2. Viral upper respiratory infection  - promethazine-dextromethorphan (PROMETHAZINE-DM) 6.25-15 MG/5ML syrup; Take 5 mL by mouth 4 times a day as needed for Cough.  Dispense: 140 mL; Refill: 0    - Advised to try OTC mucinex, fluticasone nasal spray, ibuprofen/acetaminophen prn for symptom relief  - Advised to return if symptoms worsen or do not improve    Differential diagnosis, natural history, supportive care, and indications for immediate follow-up discussed.

## 2019-02-11 ENCOUNTER — APPOINTMENT (RX ONLY)
Dept: URBAN - METROPOLITAN AREA CLINIC 22 | Facility: CLINIC | Age: 49
Setting detail: DERMATOLOGY
End: 2019-02-11

## 2019-02-11 PROBLEM — C44.91 BASAL CELL CARCINOMA OF SKIN, UNSPECIFIED: Status: ACTIVE | Noted: 2019-02-11

## 2019-02-11 PROCEDURE — ? MOHS SURGERY PHONE CONSULTATION

## 2019-02-11 NOTE — PROCEDURE: MOHS SURGERY PHONE CONSULTATION
Office Location Of Mohs Surgery: [facility]
Which Antibiotic Do They Take For Surgical Prophylaxis?: Amoxicillin (2 grams)
Has The Patient Ever Had A Heart Attack Or Stroke?: No
Pathology Accession #: [id number]
Patient's Insurance: [facility]
Date Of Mohs Surgery: 03/04/2019
Patient Reported Location: nasal root
Referring Provider: Mateusz Castaneda PA-C
Detail Level: Simple
Has The Pathology Report Been Received?: Yes

## 2019-03-04 ENCOUNTER — APPOINTMENT (RX ONLY)
Dept: URBAN - METROPOLITAN AREA CLINIC 36 | Facility: CLINIC | Age: 49
Setting detail: DERMATOLOGY
End: 2019-03-04

## 2019-03-04 DIAGNOSIS — L57.8 OTHER SKIN CHANGES DUE TO CHRONIC EXPOSURE TO NONIONIZING RADIATION: ICD-10-CM

## 2019-03-04 DIAGNOSIS — L57.0 ACTINIC KERATOSIS: ICD-10-CM

## 2019-03-04 PROBLEM — C44.311 BASAL CELL CARCINOMA OF SKIN OF NOSE: Status: ACTIVE | Noted: 2019-03-04

## 2019-03-04 PROCEDURE — ? PHOTODYNAMIC THERAPY COUNSELING

## 2019-03-04 PROCEDURE — 17312 MOHS ADDL STAGE: CPT

## 2019-03-04 PROCEDURE — 17311 MOHS 1 STAGE H/N/HF/G: CPT

## 2019-03-04 PROCEDURE — ? COUNSELING

## 2019-03-04 PROCEDURE — ? MOHS SURGERY

## 2019-03-04 PROCEDURE — 13152 CMPLX RPR E/N/E/L 2.6-7.5 CM: CPT

## 2019-03-04 NOTE — HPI: PROCEDURE (MOHS)
Has The Growth Been Previously Biopsied?: has been previously biopsied
Additional History: Referral from JADA Costa.

## 2019-03-04 NOTE — PROCEDURE: MOHS SURGERY
Cheiloplasty (Less Than 50%) Text: A decision was made to reconstruct the defect with a  cheiloplasty.  The defect was undermined extensively.  Additional obicularis oris muscle was excised with a 15 blade scalpel.  The defect was converted into a full thickness wedge, of less than 50% of the vertical height of the lip, to facilite a better cosmetic result.  Small vessels were then tied off with 5-0 monocyrl. The obicularis oris, superficial fascia, adipose and dermis were then reapproximated.  After the deeper layers were approximated the epidermis was reapproximated with particular care given to realign the vermilion border.
Bill For Surgical Tray: no
Bilobed Flap Text: The defect edges were debeveled with a #15 scalpel blade.  Given the location of the defect and the proximity to free margins a bilobe flap was deemed most appropriate.  Using a sterile surgical marker, an appropriate bilobe flap drawn around the defect.    The area thus outlined was incised deep to adipose tissue with a #15 scalpel blade.  The skin margins were undermined to an appropriate distance in all directions utilizing iris scissors.
Show Repair Assistants Variable: Yes
Epidermal Closure: running cuticular
Asc Procedure Text (B): After obtaining clear surgical margins the patient was sent to an ASC for surgical repair.  The patient understands they will receive post-surgical care and follow-up from the ASC physician.
Stage 3: Number Of Blocks?: 1
Stage 10: Number Of Blocks?: 0
Split-Thickness Skin Graft Text: The defect edges were debeveled with a #15 scalpel blade.  Given the location of the defect, shape of the defect and the proximity to free margins a split thickness skin graft was deemed most appropriate.  Using a sterile surgical marker, the primary defect shape was transferred to the donor site. The split thickness graft was then harvested.  The skin graft was then placed in the primary defect and oriented appropriately.
No Repair - Repaired With Adjacent Surgical Defect Text (Leave Blank If You Do Not Want): After obtaining clear surgical margins the defect was repaired concurrently with another surgical defect which was in close approximation.
O-T Plasty Text: The defect edges were debeveled with a #15 scalpel blade.  Given the location of the defect, shape of the defect and the proximity to free margins an O-T plasty was deemed most appropriate.  Using a sterile surgical marker, an appropriate O-T plasty was drawn incorporating the defect and placing the expected incisions within the relaxed skin tension lines where possible.    The area thus outlined was incised deep to adipose tissue with a #15 scalpel blade.  The skin margins were undermined to an appropriate distance in all directions utilizing iris scissors.
Repair Performed By Another Provider Text (Leave Blank If You Do Not Want): After obtaining clear surgical margins the defect was repaired by another provider.
Advancement-Rotation Flap Text: The defect edges were debeveled with a #15 scalpel blade.  Given the location of the defect, shape of the defect and the proximity to free margins an advancement-rotation flap was deemed most appropriate.  Using a sterile surgical marker, an appropriate flap was drawn incorporating the defect and placing the expected incisions within the relaxed skin tension lines where possible. The area thus outlined was incised deep to adipose tissue with a #15 scalpel blade.  The skin margins were undermined to an appropriate distance in all directions utilizing iris scissors.
Stage 5: Additional Anesthesia Type: 1% lidocaine with epinephrine
Wound Care (No Sutures): Petrolatum
Crescentic Intermediate Repair Preamble Text (Leave Blank If You Do Not Want): Undermining was performed with blunt dissection.
Deep Sutures: 5-0 Maxon
Crescentic Advancement Flap Text: The defect edges were debeveled with a #15 scalpel blade.  Given the location of the defect and the proximity to free margins a crescentic advancement flap was deemed most appropriate.  Using a sterile surgical marker, the appropriate advancement flap was drawn incorporating the defect and placing the expected incisions within the relaxed skin tension lines where possible.    The area thus outlined was incised deep to adipose tissue with a #15 scalpel blade.  The skin margins were undermined to an appropriate distance in all directions utilizing iris scissors.
Xenograft Text: The defect edges were debeveled with a #15 scalpel blade.  Given the location of the defect, shape of the defect and the proximity to free margins a xenograft was deemed most appropriate.  The graft was then trimmed to fit the size of the defect.  The graft was then placed in the primary defect and oriented appropriately.
Graft Donor Site Dermal Sutures (Optional): 5-0 Polysorb
Rhombic Flap Text: The defect edges were debeveled with a #15 scalpel blade.  Given the location of the defect and the proximity to free margins a rhombic flap was deemed most appropriate.  Using a sterile surgical marker, an appropriate rhombic flap was drawn incorporating the defect.    The area thus outlined was incised deep to adipose tissue with a #15 scalpel blade.  The skin margins were undermined to an appropriate distance in all directions utilizing iris scissors.
Consent 3/Introductory Paragraph: I gave the patient a chance to ask questions they had about the procedure.  Following this I explained the Mohs procedure and consent was obtained. The risks, benefits and alternatives to therapy were discussed in detail. Specifically, the risks of infection, scarring, bleeding, prolonged wound healing, incomplete removal, allergy to anesthesia, nerve injury and recurrence were addressed. Prior to the procedure, the treatment site was clearly identified and confirmed by the patient. All components of Universal Protocol/PAUSE Rule completed.
Epidermal Sutures: 6-0 Surgipro
Graft Donor Site Epidermal Sutures (Optional): 5-0 Surgipro
Double Island Pedicle Flap Text: The defect edges were debeveled with a #15 scalpel blade.  Given the location of the defect, shape of the defect and the proximity to free margins a double island pedicle advancement flap was deemed most appropriate.  Using a sterile surgical marker, an appropriate advancement flap was drawn incorporating the defect, outlining the appropriate donor tissue and placing the expected incisions within the relaxed skin tension lines where possible.    The area thus outlined was incised deep to adipose tissue with a #15 scalpel blade.  The skin margins were undermined to an appropriate distance in all directions around the primary defect and laterally outward around the island pedicle utilizing iris scissors.  There was minimal undermining beneath the pedicle flap.
Oculoplastic Surgeon Procedure Text (A): After obtaining clear surgical margins the patient was sent to oculoplastics for surgical repair.  The patient understands they will receive post-surgical care and follow-up from the referring physician's office.
Postop Diagnosis: same
Plastic Surgeon Procedure Text (F): After obtaining clear surgical margins the patient was sent to plastics for surgical repair.  The patient understands they will receive post-surgical care and follow-up from the referring physician's office.
Estimated Blood Loss (Cc): minimal
Otolaryngologist Procedure Text (E): After obtaining clear surgical margins the patient was sent to otolaryngology for surgical repair.  The patient understands they will receive post-surgical care and follow-up from the referring physician's office.
O-L Flap Text: The defect edges were debeveled with a #15 scalpel blade.  Given the location of the defect, shape of the defect and the proximity to free margins an O-L flap was deemed most appropriate.  Using a sterile surgical marker, an appropriate advancement flap was drawn incorporating the defect and placing the expected incisions within the relaxed skin tension lines where possible.    The area thus outlined was incised deep to adipose tissue with a #15 scalpel blade.  The skin margins were undermined to an appropriate distance in all directions utilizing iris scissors.
Mid-Level Procedure Text (C): After obtaining clear surgical margins the patient was sent to a mid-level provider for surgical repair.  The patient understands they will receive post-surgical care and follow-up from the mid-level provider.
Trilobed Flap Text: The defect edges were debeveled with a #15 scalpel blade.  Given the location of the defect and the proximity to free margins a trilobed flap was deemed most appropriate.  Using a sterile surgical marker, an appropriate trilobed flap drawn around the defect.    The area thus outlined was incised deep to adipose tissue with a #15 scalpel blade.  The skin margins were undermined to an appropriate distance in all directions utilizing iris scissors.
Location Indication Override (Is Already Calculated Based On Selected Body Location): Area H
Partial Purse String (Simple) Text: Given the location of the defect and the characteristics of the surrounding skin a simple purse string closure was deemed most appropriate.  Undermining was performed circumfirentially around the surgical defect.  A purse string suture was then placed and tightened. Wound tension only allowed a partial closure of the circular defect.
Manual Repair Warning Statement: We plan on removing the manually selected variable below in favor of our much easier automatic structured text blocks found in the previous tab. We decided to do this to help make the flow better and give you the full power of structured data. Manual selection is never going to be ideal in our platform and I would encourage you to avoid using manual selection from this point on, especially since I will be sunsetting this feature. It is important that you do one of two things with the customized text below. First, you can save all of the text in a word file so you can have it for future reference. Second, transfer the text to the appropriate area in the Library tab. Lastly, if there is a flap or graft type which we do not have you need to let us know right away so I can add it in before the variable is hidden. No need to panic, we plan to give you roughly 6 months to make the change.
Ear Wedge Repair Text: A wedge excision was completed by carrying down an excision through the full thickness of the ear and cartilage with an inward facing Burow's triangle. The wound was then closed in a layered fashion.
Bilateral Helical Rim Advancement Flap Text: The defect edges were debeveled with a #15 blade scalpel.  Given the location of the defect and the proximity to free margins (helical rim) a bilateral helical rim advancement flap was deemed most appropriate.  Using a sterile surgical marker, the appropriate advancement flaps were drawn incorporating the defect and placing the expected incisions between the helical rim and antihelix where possible.  The area thus outlined was incised through and through with a #15 scalpel blade.  With a skin hook and iris scissors, the flaps were gently and sharply undermined and freed up.
Post-Care Instructions: I reviewed with the patient in detail post-care instructions. Patient is not to engage in any heavy lifting, exercise, or swimming for the next 14 days. Should the patient develop any fevers, chills, bleeding, severe pain patient will contact the office immediately.
Purse String (Simple) Text: Given the location of the defect and the characteristics of the surrounding skin a purse string closure was deemed most appropriate.  Undermining was performed circumfirentially around the surgical defect.  A purse string suture was then placed and tightened.
Alar Island Pedicle Flap Text: The defect edges were debeveled with a #15 scalpel blade.  Given the location of the defect, shape of the defect and the proximity to the alar rim an island pedicle advancement flap was deemed most appropriate.  Using a sterile surgical marker, an appropriate advancement flap was drawn incorporating the defect, outlining the appropriate donor tissue and placing the expected incisions within the nasal ala running parallel to the alar rim. The area thus outlined was incised with a #15 scalpel blade.  The skin margins were undermined minimally to an appropriate distance in all directions around the primary defect and laterally outward around the island pedicle utilizing iris scissors.  There was minimal undermining beneath the pedicle flap.
Lazy S Complex Repair Preamble Text (Leave Blank If You Do Not Want): Extensive wide undermining was performed.
Unna Boot Text: An Unna boot was placed to help immobilize the limb and facilitate more rapid healing.
Closure 3 Information: This tab is for additional flaps and grafts above and beyond our usual structured repairs.  Please note if you enter information here it will not currently bill and you will need to add the billing information manually.
W Plasty Text: The lesion was extirpated to the level of the fat with a #15 scalpel blade.  Given the location of the defect, shape of the defect and the proximity to free margins a W-plasty was deemed most appropriate for repair.  Using a sterile surgical marker, the appropriate transposition arms of the W-plasty were drawn incorporating the defect and placing the expected incisions within the relaxed skin tension lines where possible.    The area thus outlined was incised deep to adipose tissue with a #15 scalpel blade.  The skin margins were undermined to an appropriate distance in all directions utilizing iris scissors.  The opposing transposition arms were then transposed into place in opposite direction and anchored with interrupted buried subcutaneous sutures.
Consent (Marginal Mandibular)/Introductory Paragraph: The rationale for Mohs was explained to the patient and consent was obtained. The risks, benefits and alternatives to therapy were discussed in detail. Specifically, the risks of damage to the marginal mandibular branch of the facial nerve, infection, scarring, bleeding, prolonged wound healing, incomplete removal, allergy to anesthesia, and recurrence were addressed. Prior to the procedure, the treatment site was clearly identified and confirmed by the patient. All components of Universal Protocol/PAUSE Rule completed.
Consent (Near Eyelid Margin)/Introductory Paragraph: The rationale for Mohs was explained to the patient and consent was obtained. The risks, benefits and alternatives to therapy were discussed in detail. Specifically, the risks of ectropion or eyelid deformity, infection, scarring, bleeding, prolonged wound healing, incomplete removal, allergy to anesthesia, nerve injury and recurrence were addressed. Prior to the procedure, the treatment site was clearly identified and confirmed by the patient. All components of Universal Protocol/PAUSE Rule completed.
Tarsorrhaphy Text: A tarsorrhaphy was performed using Frost sutures.
Eye Protection Verbiage: Before proceeding with the stage, a plastic scleral shield was inserted. The globe was anesthetized with a few drops of 1% lidocaine with 1:100,000 epinephrine. Then, an appropriate sized scleral shield was chosen and coated with lacrilube ointment. The shield was gently inserted and left in place for the duration of each stage. After the stage was completed, the shield was gently removed.
Consent (Nose)/Introductory Paragraph: The rationale for Mohs was explained to the patient and consent was obtained. The risks, benefits and alternatives to therapy were discussed in detail. Specifically, the risks of nasal deformity, changes in the flow of air through the nose, infection, scarring, bleeding, prolonged wound healing, incomplete removal, allergy to anesthesia, nerve injury and recurrence were addressed. Prior to the procedure, the treatment site was clearly identified and confirmed by the patient. All components of Universal Protocol/PAUSE Rule completed.
O-Z Plasty Text: The defect edges were debeveled with a #15 scalpel blade.  Given the location of the defect, shape of the defect and the proximity to free margins an O-Z plasty (double transposition flap) was deemed most appropriate.  Using a sterile surgical marker, the appropriate transposition flaps were drawn incorporating the defect and placing the expected incisions within the relaxed skin tension lines where possible.    The area thus outlined was incised deep to adipose tissue with a #15 scalpel blade.  The skin margins were undermined to an appropriate distance in all directions utilizing iris scissors.  Hemostasis was achieved with electrocautery.  The flaps were then transposed into place, one clockwise and the other counterclockwise, and anchored with interrupted buried subcutaneous sutures.
Area L Indication Text: Tumors in this location are included in Area L (trunk and extremities).  Mohs surgery is indicated for larger tumors, or tumors with aggressive histologic features, in these anatomic locations.
Bcc Histology Text: There were numerous aggregates of basaloid cells.
Non-Graft Cartilage Fenestration Text: The cartilage was fenestrated with a 2mm punch biopsy to help facilitate healing.
Closure 2 Information: This tab is for additional flaps and grafts, including complex repair and grafts and complex repair and flaps. You can also specify a different location for the additional defect, if the location is the same you do not need to select a new one. We will insert the automated text for the repair you select below just as we do for solitary flaps and grafts. Please note that at this time if you select a location with a different insurance zone you will need to override the ICD10 and CPT if appropriate.
Consent (Scalp)/Introductory Paragraph: The rationale for Mohs was explained to the patient and consent was obtained. The risks, benefits and alternatives to therapy were discussed in detail. Specifically, the risks of changes in hair growth pattern secondary to repair, infection, scarring, bleeding, prolonged wound healing, incomplete removal, allergy to anesthesia, nerve injury and recurrence were addressed. Prior to the procedure, the treatment site was clearly identified and confirmed by the patient. All components of Universal Protocol/PAUSE Rule completed.
Dermal Autograft Text: The defect edges were debeveled with a #15 scalpel blade.  Given the location of the defect, shape of the defect and the proximity to free margins a dermal autograft was deemed most appropriate.  Using a sterile surgical marker, the primary defect shape was transferred to the donor site. The area thus outlined was incised deep to adipose tissue with a #15 scalpel blade.  The harvested graft was then trimmed of adipose and epidermal tissue until only dermis was left.  The skin graft was then placed in the primary defect and oriented appropriately.
Donor Site Anesthesia Type: same as repair anesthesia
Surgical Defect Length In Cm (Optional): 1.2
S Plasty Text: Given the location and shape of the defect, and the orientation of relaxed skin tension lines, an S-plasty was deemed most appropriate for repair.  Using a sterile surgical marker, the appropriate outline of the S-plasty was drawn, incorporating the defect and placing the expected incisions within the relaxed skin tension lines where possible.  The area thus outlined was incised deep to adipose tissue with a #15 scalpel blade.  The skin margins were undermined to an appropriate distance in all directions utilizing iris scissors. The skin flaps were advanced over the defect.  The opposing margins were then approximated with interrupted buried subcutaneous sutures.
Surgical Defect Length In Cm (Optional): 1.1
Island Pedicle Flap-Requiring Vessel Identification Text: The defect edges were debeveled with a #15 scalpel blade.  Given the location of the defect, shape of the defect and the proximity to free margins an island pedicle advancement flap was deemed most appropriate.  Using a sterile surgical marker, an appropriate advancement flap was drawn, based on the axial vessel mentioned above, incorporating the defect, outlining the appropriate donor tissue and placing the expected incisions within the relaxed skin tension lines where possible.    The area thus outlined was incised deep to adipose tissue with a #15 scalpel blade.  The skin margins were undermined to an appropriate distance in all directions around the primary defect and laterally outward around the island pedicle utilizing iris scissors.  There was minimal undermining beneath the pedicle flap.
Island Pedicle Flap Text: The defect edges were debeveled with a #15 scalpel blade.  Given the location of the defect, shape of the defect and the proximity to free margins an island pedicle advancement flap was deemed most appropriate.  Using a sterile surgical marker, an appropriate advancement flap was drawn incorporating the defect, outlining the appropriate donor tissue and placing the expected incisions within the relaxed skin tension lines where possible.    The area thus outlined was incised deep to adipose tissue with a #15 scalpel blade.  The skin margins were undermined to an appropriate distance in all directions around the primary defect and laterally outward around the island pedicle utilizing iris scissors.  There was minimal undermining beneath the pedicle flap.
Same Histology In Subsequent Stages Text: The pattern and morphology of the tumor is as described in the first stage.
No Residual Tumor Seen Histology Text: There were no malignant cells seen in the sections examined.
Mauc Instructions: By selecting yes to the question below the MAUC number will be added into the note.  This will be calculated automatically based on the diagnosis chosen, the size entered, the body zone selected (H,M,L) and the specific indications you chose. You will also have the option to override the Mohs AUC if you disagree with the automatically calculated number and this option is found in the Case Summary tab.
Mohs Method Verbiage: An incision at a 45 degree angle following the standard Mohs approach was done and the specimen was harvested as a microscopic controlled layer.
Transposition Flap Text: The defect edges were debeveled with a #15 scalpel blade.  Given the location of the defect and the proximity to free margins a transposition flap was deemed most appropriate.  Using a sterile surgical marker, an appropriate transposition flap was drawn incorporating the defect.    The area thus outlined was incised deep to adipose tissue with a #15 scalpel blade.  The skin margins were undermined to an appropriate distance in all directions utilizing iris scissors.
Hatchet Flap Text: The defect edges were debeveled with a #15 scalpel blade.  Given the location of the defect, shape of the defect and the proximity to free margins a hatchet flap was deemed most appropriate.  Using a sterile surgical marker, an appropriate hatchet flap was drawn incorporating the defect and placing the expected incisions within the relaxed skin tension lines where possible.    The area thus outlined was incised deep to adipose tissue with a #15 scalpel blade.  The skin margins were undermined to an appropriate distance in all directions utilizing iris scissors.
Paramedian Forehead Flap Text: A decision was made to reconstruct the defect utilizing an interpolation axial flap and a staged reconstruction.  A telfa template was made of the defect.  This telfa template was then used to outline the paramedian forehead pedicle flap.  The donor area for the pedicle flap was then injected with anesthesia.  The flap was excised through the skin and subcutaneous tissue down to the layer of the underlying musculature.  The pedicle flap was carefully excised within this deep plane to maintain its blood supply.  The edges of the donor site were undermined.   The donor site was closed in a primary fashion.  The pedicle was then rotated into position and sutured.  Once the tube was sutured into place, adequate blood supply was confirmed with blanching and refill.  The pedicle was then wrapped with xeroform gauze and dressed appropriately with a telfa and gauze bandage to ensure continued blood supply and protect the attached pedicle.
Medical Necessity Statement: Based on my medical judgement, Mohs surgery is the most appropriate treatment for this cancer compared to other treatments.
Purse String (Intermediate) Text: Given the location of the defect and the characteristics of the surrounding skin a purse string intermediate closure was deemed most appropriate.  Undermining was performed circumfirentially around the surgical defect.  A purse string suture was then placed and tightened.
Consent (Temporal Branch)/Introductory Paragraph: The rationale for Mohs was explained to the patient and consent was obtained. The risks, benefits and alternatives to therapy were discussed in detail. Specifically, the risks of damage to the temporal branch of the facial nerve, infection, scarring, bleeding, prolonged wound healing, incomplete removal, allergy to anesthesia, and recurrence were addressed. Prior to the procedure, the treatment site was clearly identified and confirmed by the patient. All components of Universal Protocol/PAUSE Rule completed.
Dressing: pressure dressing with telfa
Number Of Stages: 4
Complex Repair And Flap Additional Text (Will Appearing After The Standard Complex Repair Text): The complex repair was not sufficient to completely close the primary defect. The remaining additional defect was repaired with the flap mentioned below.
Star Wedge Flap Text: The defect edges were debeveled with a #15 scalpel blade.  Given the location of the defect, shape of the defect and the proximity to free margins a star wedge flap was deemed most appropriate.  Using a sterile surgical marker, an appropriate rotation flap was drawn incorporating the defect and placing the expected incisions within the relaxed skin tension lines where possible. The area thus outlined was incised deep to adipose tissue with a #15 scalpel blade.  The skin margins were undermined to an appropriate distance in all directions utilizing iris scissors.
Spiral Flap Text: The defect edges were debeveled with a #15 scalpel blade.  Given the location of the defect, shape of the defect and the proximity to free margins a spiral flap was deemed most appropriate.  Using a sterile surgical marker, an appropriate rotation flap was drawn incorporating the defect and placing the expected incisions within the relaxed skin tension lines where possible. The area thus outlined was incised deep to adipose tissue with a #15 scalpel blade.  The skin margins were undermined to an appropriate distance in all directions utilizing iris scissors.
Mohs Histo Method Verbiage: Each section was then chromacoded and processed in the Mohs lab using the Mohs protocol and submitted for frozen section.
O-T Advancement Flap Text: The defect edges were debeveled with a #15 scalpel blade.  Given the location of the defect, shape of the defect and the proximity to free margins an O-T advancement flap was deemed most appropriate.  Using a sterile surgical marker, an appropriate advancement flap was drawn incorporating the defect and placing the expected incisions within the relaxed skin tension lines where possible.    The area thus outlined was incised deep to adipose tissue with a #15 scalpel blade.  The skin margins were undermined to an appropriate distance in all directions utilizing iris scissors.
Information: Selecting Yes will display possible errors in your note based on the variables you have selected. This validation is only offered as a suggestion for you. PLEASE NOTE THAT THE VALIDATION TEXT WILL BE REMOVED WHEN YOU FINALIZE YOUR NOTE. IF YOU WANT TO FAX A PRELIMINARY NOTE YOU WILL NEED TO TOGGLE THIS TO 'NO' IF YOU DO NOT WANT IT IN YOUR FAXED NOTE.
Anesthesia Volume In Cc: 9
Muscle Hinge Flap Text: The defect edges were debeveled with a #15 scalpel blade.  Given the size, depth and location of the defect and the proximity to free margins a muscle hinge flap was deemed most appropriate.  Using a sterile surgical marker, an appropriate hinge flap was drawn incorporating the defect. The area thus outlined was incised with a #15 scalpel blade.  The skin margins were undermined to an appropriate distance in all directions utilizing iris scissors.
Repair Hemostasis (Optional): Pinpoint electrocautery
Dorsal Nasal Flap Text: The defect edges were debeveled with a #15 scalpel blade.  Given the location of the defect and the proximity to free margins a dorsal nasal flap was deemed most appropriate.  Using a sterile surgical marker, an appropriate dorsal nasal flap was drawn around the defect.    The area thus outlined was incised deep to adipose tissue with a #15 scalpel blade.  The skin margins were undermined to an appropriate distance in all directions utilizing iris scissors.
Simple / Intermediate / Complex Repair - Final Wound Length In Cm: 2.6
Surgeon/Pathologist Verbiage (Will Incorporate Name Of Surgeon From Intro If Not Blank): operated in two distinct and integrated capacities as the surgeon and pathologist.
Complex Repair And Graft Additional Text (Will Appearing After The Standard Complex Repair Text): The complex repair was not sufficient to completely close the primary defect. The remaining additional defect was repaired with the graft mentioned below.
Cheiloplasty (Complex) Text: A decision was made to reconstruct the defect with a  cheiloplasty.  The defect was undermined extensively.  Additional obicularis oris muscle was excised with a 15 blade scalpel.  The defect was converted into a full thickness wedge to facilite a better cosmetic result.  Small vessels were then tied off with 5-0 monocyrl. The obicularis oris, superficial fascia, adipose and dermis were then reapproximated.  After the deeper layers were approximated the epidermis was reapproximated with particular care given to realign the vermilion border.
Additional Anesthesia Volume In Cc: 6
Mastoid Interpolation Flap Text: A decision was made to reconstruct the defect utilizing an interpolation axial flap and a staged reconstruction.  A telfa template was made of the defect.  This telfa template was then used to outline the mastoid interpolation flap.  The donor area for the pedicle flap was then injected with anesthesia.  The flap was excised through the skin and subcutaneous tissue down to the layer of the underlying musculature.  The pedicle flap was carefully excised within this deep plane to maintain its blood supply.  The edges of the donor site were undermined.   The donor site was closed in a primary fashion.  The pedicle was then rotated into position and sutured.  Once the tube was sutured into place, adequate blood supply was confirmed with blanching and refill.  The pedicle was then wrapped with xeroform gauze and dressed appropriately with a telfa and gauze bandage to ensure continued blood supply and protect the attached pedicle.
Area H Indication Text: Tumors in this location are included in Area H (eyelids, eyebrows, nose, lips, chin, ear, pre-auricular, post-auricular, temple, genitalia, hands, feet, ankles and areola).  Tissue conservation is critical in these anatomic locations.
Interpolation Flap Text: A decision was made to reconstruct the defect utilizing an interpolation axial flap and a staged reconstruction.  A telfa template was made of the defect.  This telfa template was then used to outline the interpolation flap.  The donor area for the pedicle flap was then injected with anesthesia.  The flap was excised through the skin and subcutaneous tissue down to the layer of the underlying musculature.  The interpolation flap was carefully excised within this deep plane to maintain its blood supply.  The edges of the donor site were undermined.   The donor site was closed in a primary fashion.  The pedicle was then rotated into position and sutured.  Once the tube was sutured into place, adequate blood supply was confirmed with blanching and refill.  The pedicle was then wrapped with xeroform gauze and dressed appropriately with a telfa and gauze bandage to ensure continued blood supply and protect the attached pedicle.
Repair Type: Complex Repair
V-Y Flap Text: The defect edges were debeveled with a #15 scalpel blade.  Given the location of the defect, shape of the defect and the proximity to free margins a V-Y flap was deemed most appropriate.  Using a sterile surgical marker, an appropriate advancement flap was drawn incorporating the defect and placing the expected incisions within the relaxed skin tension lines where possible.    The area thus outlined was incised deep to adipose tissue with a #15 scalpel blade.  The skin margins were undermined to an appropriate distance in all directions utilizing iris scissors.
Bilobed Transposition Flap Text: The defect edges were debeveled with a #15 scalpel blade.  Given the location of the defect and the proximity to free margins a bilobed transposition flap was deemed most appropriate.  Using a sterile surgical marker, an appropriate bilobe flap drawn around the defect.    The area thus outlined was incised deep to adipose tissue with a #15 scalpel blade.  The skin margins were undermined to an appropriate distance in all directions utilizing iris scissors.
Mohs Rapid Report Verbiage: The area of clinically evident tumor was marked with skin marking ink and appropriately hatched.  The initial incision was made following the Mohs approach through the skin.  The specimen was taken to the lab, divided into the necessary number of pieces, chromacoded and processed according to the Mohs protocol.  This was repeated in successive stages until a tumor free defect was achieved.
Consent (Lip)/Introductory Paragraph: The rationale for Mohs was explained to the patient and consent was obtained. The risks, benefits and alternatives to therapy were discussed in detail. Specifically, the risks of lip deformity, changes in the oral aperture, infection, scarring, bleeding, prolonged wound healing, incomplete removal, allergy to anesthesia, nerve injury and recurrence were addressed. Prior to the procedure, the treatment site was clearly identified and confirmed by the patient. All components of Universal Protocol/PAUSE Rule completed.
Consent (Ear)/Introductory Paragraph: The rationale for Mohs was explained to the patient and consent was obtained. The risks, benefits and alternatives to therapy were discussed in detail. Specifically, the risks of ear deformity, infection, scarring, bleeding, prolonged wound healing, incomplete removal, allergy to anesthesia, nerve injury and recurrence were addressed. Prior to the procedure, the treatment site was clearly identified and confirmed by the patient. All components of Universal Protocol/PAUSE Rule completed.
Consent 1/Introductory Paragraph: The rationale for Mohs was explained to the patient and consent was obtained. The risks, benefits and alternatives to therapy were discussed in detail. Specifically, the risks of infection, scarring, bleeding, prolonged wound healing, incomplete removal, allergy to anesthesia, nerve injury and recurrence were addressed. Prior to the procedure, the treatment site was clearly identified and confirmed by the patient. All components of Universal Protocol/PAUSE Rule completed.
Ear Star Wedge Flap Text: The defect edges were debeveled with a #15 blade scalpel.  Given the location of the defect and the proximity to free margins (helical rim) an ear star wedge flap was deemed most appropriate.  Using a sterile surgical marker, the appropriate flap was drawn incorporating the defect and placing the expected incisions between the helical rim and antihelix where possible.  The area thus outlined was incised through and through with a #15 scalpel blade.
Epidermal Autograft Text: The defect edges were debeveled with a #15 scalpel blade.  Given the location of the defect, shape of the defect and the proximity to free margins an epidermal autograft was deemed most appropriate.  Using a sterile surgical marker, the primary defect shape was transferred to the donor site. The epidermal graft was then harvested.  The skin graft was then placed in the primary defect and oriented appropriately.
Advancement Flap (Double) Text: The defect edges were debeveled with a #15 scalpel blade.  Given the location of the defect and the proximity to free margins a double advancement flap was deemed most appropriate.  Using a sterile surgical marker, the appropriate advancement flaps were drawn incorporating the defect and placing the expected incisions within the relaxed skin tension lines where possible.    The area thus outlined was incised deep to adipose tissue with a #15 scalpel blade.  The skin margins were undermined to an appropriate distance in all directions utilizing iris scissors.
Mucosal Advancement Flap Text: Given the location of the defect, shape of the defect and the proximity to free margins a mucosal advancement flap was deemed most appropriate. Incisions were made with a 15 blade scalpel in the appropriate fashion along the cutaneous vermilion border and the mucosal lip. The remaining actinically damaged mucosal tissue was excised.  The mucosal advancement flap was then elevated to the gingival sulcus with care taken to preserve the neurovascular structures and advanced into the primary defect. Care was taken to ensure that precise realignment of the vermilion border was achieved.
Secondary Intention Text (Leave Blank If You Do Not Want): The defect will heal with secondary intention.
Consent (Spinal Accessory)/Introductory Paragraph: The rationale for Mohs was explained to the patient and consent was obtained. The risks, benefits and alternatives to therapy were discussed in detail. Specifically, the risks of damage to the spinal accessory nerve, infection, scarring, bleeding, prolonged wound healing, incomplete removal, allergy to anesthesia, and recurrence were addressed. Prior to the procedure, the treatment site was clearly identified and confirmed by the patient. All components of Universal Protocol/PAUSE Rule completed.
Consent 2/Introductory Paragraph: Mohs surgery was explained to the patient and consent was obtained. The risks, benefits and alternatives to therapy were discussed in detail. Specifically, the risks of infection, scarring, bleeding, prolonged wound healing, incomplete removal, allergy to anesthesia, nerve injury and recurrence were addressed. Prior to the procedure, the treatment site was clearly identified and confirmed by the patient. All components of Universal Protocol/PAUSE Rule completed.
Burow's Advancement Flap Text: The defect edges were debeveled with a #15 scalpel blade.  Given the location of the defect and the proximity to free margins a Burow's advancement flap was deemed most appropriate.  Using a sterile surgical marker, the appropriate advancement flap was drawn incorporating the defect and placing the expected incisions within the relaxed skin tension lines where possible.    The area thus outlined was incised deep to adipose tissue with a #15 scalpel blade.  The skin margins were undermined to an appropriate distance in all directions utilizing iris scissors.
Graft Donor Site Bandage (Optional-Leave Blank If You Don't Want In Note): Aquaplast was fitted to the graft site and sewn into place. A pressure bandage were applied to the donor site and over the aquaplast bolster.
Home Suture Removal Text: Patient was provided instructions on removing sutures and will remove their sutures at home.  If they have any questions or difficulties they will call the office.
Cartilage Graft Text: The defect edges were debeveled with a #15 scalpel blade.  Given the location of the defect, shape of the defect, the fact the defect involved a full thickness cartilage defect a cartilage graft was deemed most appropriate.  An appropriate donor site was identified, cleansed, and anesthetized. The cartilage graft was then harvested and transferred to the recipient site, oriented appropriately and then sutured into place.  The secondary defect was then repaired using a primary closure.
Wound Care: Vaseline
Composite Graft Text: The defect edges were debeveled with a #15 scalpel blade.  Given the location of the defect, shape of the defect, the proximity to free margins and the fact the defect was full thickness a composite graft was deemed most appropriate.  The defect was outline and then transferred to the donor site.  A full thickness graft was then excised from the donor site. The graft was then placed in the primary defect, oriented appropriately and then sutured into place.  The secondary defect was then repaired using a primary closure.
Surgeon: Lenka Gray MD
Wound Check: 6 weeks
Alternatives Discussed Intro (Do Not Add Period): I discussed alternative treatments to Mohs surgery and specifically discussed the risks and benefits of
Cheek Interpolation Flap Text: A decision was made to reconstruct the defect utilizing an interpolation axial flap and a staged reconstruction.  A telfa template was made of the defect.  This telfa template was then used to outline the Cheek Interpolation flap.  The donor area for the pedicle flap was then injected with anesthesia.  The flap was excised through the skin and subcutaneous tissue down to the layer of the underlying musculature.  The interpolation flap was carefully excised within this deep plane to maintain its blood supply.  The edges of the donor site were undermined.   The donor site was closed in a primary fashion.  The pedicle was then rotated into position and sutured.  Once the tube was sutured into place, adequate blood supply was confirmed with blanching and refill.  The pedicle was then wrapped with xeroform gauze and dressed appropriately with a telfa and gauze bandage to ensure continued blood supply and protect the attached pedicle.
A-T Advancement Flap Text: The defect edges were debeveled with a #15 scalpel blade.  Given the location of the defect, shape of the defect and the proximity to free margins an A-T advancement flap was deemed most appropriate.  Using a sterile surgical marker, an appropriate advancement flap was drawn incorporating the defect and placing the expected incisions within the relaxed skin tension lines where possible.    The area thus outlined was incised deep to adipose tissue with a #15 scalpel blade.  The skin margins were undermined to an appropriate distance in all directions utilizing iris scissors.
Primary Defect Width In Cm (Final Defect Size - Required For Flaps/Grafts): 1.4
Rotation Flap Text: The defect edges were debeveled with a #15 scalpel blade.  Given the location of the defect, shape of the defect and the proximity to free margins a rotation flap was deemed most appropriate.  Using a sterile surgical marker, an appropriate rotation flap was drawn incorporating the defect and placing the expected incisions within the relaxed skin tension lines where possible.    The area thus outlined was incised deep to adipose tissue with a #15 scalpel blade.  The skin margins were undermined to an appropriate distance in all directions utilizing iris scissors.
Surgical Defect Width In Cm (Optional): 0.7
Inflammation Suggestive Of Cancer Camouflage Histology Text: There was a dense lymphocytic infiltrate which prevented adequate histologic evaluation of adjacent structures.
Keystone Flap Text: The defect edges were debeveled with a #15 scalpel blade.  Given the location of the defect, shape of the defect a keystone flap was deemed most appropriate.  Using a sterile surgical marker, an appropriate keystone flap was drawn incorporating the defect, outlining the appropriate donor tissue and placing the expected incisions within the relaxed skin tension lines where possible. The area thus outlined was incised deep to adipose tissue with a #15 scalpel blade.  The skin margins were undermined to an appropriate distance in all directions around the primary defect and laterally outward around the flap utilizing iris scissors.
Advancement Flap (Single) Text: The defect edges were debeveled with a #15 scalpel blade.  Given the location of the defect and the proximity to free margins a single advancement flap was deemed most appropriate.  Using a sterile surgical marker, an appropriate advancement flap was drawn incorporating the defect and placing the expected incisions within the relaxed skin tension lines where possible.    The area thus outlined was incised deep to adipose tissue with a #15 scalpel blade.  The skin margins were undermined to an appropriate distance in all directions utilizing iris scissors.
Repair Anesthesia Method: local infiltration
Suture Removal: 7 days
Bi-Rhombic Flap Text: The defect edges were debeveled with a #15 scalpel blade.  Given the location of the defect and the proximity to free margins a bi-rhombic flap was deemed most appropriate.  Using a sterile surgical marker, an appropriate rhombic flap was drawn incorporating the defect. The area thus outlined was incised deep to adipose tissue with a #15 scalpel blade.  The skin margins were undermined to an appropriate distance in all directions utilizing iris scissors.
Previous Accession (Optional): [id number]
Consent Type: Consent 1 (Standard)
Z Plasty Text: The lesion was extirpated to the level of the fat with a #15 scalpel blade.  Given the location of the defect, shape of the defect and the proximity to free margins a Z-plasty was deemed most appropriate for repair.  Using a sterile surgical marker, the appropriate transposition arms of the Z-plasty were drawn incorporating the defect and placing the expected incisions within the relaxed skin tension lines where possible.    The area thus outlined was incised deep to adipose tissue with a #15 scalpel blade.  The skin margins were undermined to an appropriate distance in all directions utilizing iris scissors.  The opposing transposition arms were then transposed into place in opposite direction and anchored with interrupted buried subcutaneous sutures.
Ftsg Text: The defect edges were debeveled with a #15 scalpel blade.  Given the location of the defect, shape of the defect and the proximity to free margins a full thickness skin graft was deemed most appropriate.  Using a sterile surgical marker, the primary defect shape was transferred to the donor site. The area thus outlined was incised deep to adipose tissue with a #15 scalpel blade.  The harvested graft was then trimmed of adipose tissue until only dermis and epidermis was left.  The skin margins of the secondary defect were undermined to an appropriate distance in all directions utilizing iris scissors.  The secondary defect was closed with interrupted buried subcutaneous sutures.  The skin edges were then re-apposed with running  sutures.  The skin graft was then placed in the primary defect and oriented appropriately.
Subsequent Stages Histo Method Verbiage: Using a similar technique to that described above, a thin layer of tissue was removed from all areas where tumor was visible on the previous stage.  The tissue was again oriented, mapped, dyed, and processed as above.
Graft Cartilage Fenestration Text: The cartilage was fenestrated with a 2mm punch biopsy to help facilitate graft survival and healing.
Tissue Cultured Epidermal Autograft Text: The defect edges were debeveled with a #15 scalpel blade.  Given the location of the defect, shape of the defect and the proximity to free margins a tissue cultured epidermal autograft was deemed most appropriate.  The graft was then trimmed to fit the size of the defect.  The graft was then placed in the primary defect and oriented appropriately.
X Size Of Lesion In Cm (Optional): 0.6
Undermining Location (Optional): in the superficial subcutaneous fat
Surgical Defect Length In Cm (Optional): 0.5
Hemostasis: Electrocautery
Mercedes Flap Text: The defect edges were debeveled with a #15 scalpel blade.  Given the location of the defect, shape of the defect and the proximity to free margins a Mercedes flap was deemed most appropriate.  Using a sterile surgical marker, an appropriate advancement flap was drawn incorporating the defect and placing the expected incisions within the relaxed skin tension lines where possible. The area thus outlined was incised deep to adipose tissue with a #15 scalpel blade.  The skin margins were undermined to an appropriate distance in all directions utilizing iris scissors.
Detail Level: Detailed
Banner Transposition Flap Text: The defect edges were debeveled with a #15 scalpel blade.  Given the location of the defect and the proximity to free margins a Banner transposition flap was deemed most appropriate.  Using a sterile surgical marker, an appropriate flap drawn around the defect. The area thus outlined was incised deep to adipose tissue with a #15 scalpel blade.  The skin margins were undermined to an appropriate distance in all directions utilizing iris scissors.
Melolabial Transposition Flap Text: The defect edges were debeveled with a #15 scalpel blade.  Given the location of the defect and the proximity to free margins a melolabial flap was deemed most appropriate.  Using a sterile surgical marker, an appropriate melolabial transposition flap was drawn incorporating the defect.    The area thus outlined was incised deep to adipose tissue with a #15 scalpel blade.  The skin margins were undermined to an appropriate distance in all directions utilizing iris scissors.
Full Thickness Lip Wedge Repair (Flap) Text: Given the location of the defect and the proximity to free margins a full thickness wedge repair was deemed most appropriate.  Using a sterile surgical marker, the appropriate repair was drawn incorporating the defect and placing the expected incisions perpendicular to the vermilion border.  The vermilion border was also meticulously outlined to ensure appropriate reapproximation during the repair.  The area thus outlined was incised through and through with a #15 scalpel blade.  The muscularis and dermis were reaproximated with deep sutures following hemostasis. Care was taken to realign the vermilion border before proceeding with the superficial closure.  Once the vermilion was realigned the superfical and mucosal closure was finished.
Cheek-To-Nose Interpolation Flap Text: A decision was made to reconstruct the defect utilizing an interpolation axial flap and a staged reconstruction.  A telfa template was made of the defect.  This telfa template was then used to outline the Cheek-To-Nose Interpolation flap.  The donor area for the pedicle flap was then injected with anesthesia.  The flap was excised through the skin and subcutaneous tissue down to the layer of the underlying musculature.  The interpolation flap was carefully excised within this deep plane to maintain its blood supply.  The edges of the donor site were undermined.   The donor site was closed in a primary fashion.  The pedicle was then rotated into position and sutured.  Once the tube was sutured into place, adequate blood supply was confirmed with blanching and refill.  The pedicle was then wrapped with xeroform gauze and dressed appropriately with a telfa and gauze bandage to ensure continued blood supply and protect the attached pedicle.
Posterior Auricular Interpolation Flap Text: A decision was made to reconstruct the defect utilizing an interpolation axial flap and a staged reconstruction.  A telfa template was made of the defect.  This telfa template was then used to outline the posterior auricular interpolation flap.  The donor area for the pedicle flap was then injected with anesthesia.  The flap was excised through the skin and subcutaneous tissue down to the layer of the underlying musculature.  The pedicle flap was carefully excised within this deep plane to maintain its blood supply.  The edges of the donor site were undermined.   The donor site was closed in a primary fashion.  The pedicle was then rotated into position and sutured.  Once the tube was sutured into place, adequate blood supply was confirmed with blanching and refill.  The pedicle was then wrapped with xeroform gauze and dressed appropriately with a telfa and gauze bandage to ensure continued blood supply and protect the attached pedicle.
Modified Advancement Flap Text: The defect edges were debeveled with a #15 scalpel blade.  Given the location of the defect, shape of the defect and the proximity to free margins a modified advancement flap was deemed most appropriate.  Using a sterile surgical marker, an appropriate advancement flap was drawn incorporating the defect and placing the expected incisions within the relaxed skin tension lines where possible.    The area thus outlined was incised deep to adipose tissue with a #15 scalpel blade.  The skin margins were undermined to an appropriate distance in all directions utilizing iris scissors.
Area M Indication Text: Tumors in this location are included in Area M (cheek, forehead, scalp, neck, jawline and pretibial skin).  Mohs surgery is indicated for tumors in these anatomic locations.
Bcc Infiltrative Histology Text: There were numerous aggregates of basaloid cells demonstrating an infiltrative pattern.
H Plasty Text: Given the location of the defect, shape of the defect and the proximity to free margins a H-plasty was deemed most appropriate for repair.  Using a sterile surgical marker, the appropriate advancement arms of the H-plasty were drawn incorporating the defect and placing the expected incisions within the relaxed skin tension lines where possible. The area thus outlined was incised deep to adipose tissue with a #15 scalpel blade. The skin margins were undermined to an appropriate distance in all directions utilizing iris scissors.  The opposing advancement arms were then advanced into place in opposite direction and anchored with interrupted buried subcutaneous sutures.
Epidermal Closure Graft Donor Site (Optional): running
Partial Purse String (Intermediate) Text: Given the location of the defect and the characteristics of the surrounding skin an intermediate purse string closure was deemed most appropriate.  Undermining was performed circumfirentially around the surgical defect.  A purse string suture was then placed and tightened. Wound tension only allowed a partial closure of the circular defect.
Melolabial Interpolation Flap Text: A decision was made to reconstruct the defect utilizing an interpolation axial flap and a staged reconstruction.  A telfa template was made of the defect.  This telfa template was then used to outline the melolabial interpolation flap.  The donor area for the pedicle flap was then injected with anesthesia.  The flap was excised through the skin and subcutaneous tissue down to the layer of the underlying musculature.  The pedicle flap was carefully excised within this deep plane to maintain its blood supply.  The edges of the donor site were undermined.   The donor site was closed in a primary fashion.  The pedicle was then rotated into position and sutured.  Once the tube was sutured into place, adequate blood supply was confirmed with blanching and refill.  The pedicle was then wrapped with xeroform gauze and dressed appropriately with a telfa and gauze bandage to ensure continued blood supply and protect the attached pedicle.
Surgical Defect Width In Cm (Optional): 0.9
Initial Size Of Lesion: 0.4
Mohs Case Number: [id number]
Helical Rim Advancement Flap Text: The defect edges were debeveled with a #15 blade scalpel.  Given the location of the defect and the proximity to free margins (helical rim) a double helical rim advancement flap was deemed most appropriate.  Using a sterile surgical marker, the appropriate advancement flaps were drawn incorporating the defect and placing the expected incisions between the helical rim and antihelix where possible.  The area thus outlined was incised through and through with a #15 scalpel blade.  With a skin hook and iris scissors, the flaps were gently and sharply undermined and freed up.
Island Pedicle Flap With Canthal Suspension Text: The defect edges were debeveled with a #15 scalpel blade.  Given the location of the defect, shape of the defect and the proximity to free margins an island pedicle advancement flap was deemed most appropriate.  Using a sterile surgical marker, an appropriate advancement flap was drawn incorporating the defect, outlining the appropriate donor tissue and placing the expected incisions within the relaxed skin tension lines where possible. The area thus outlined was incised deep to adipose tissue with a #15 scalpel blade.  The skin margins were undermined to an appropriate distance in all directions around the primary defect and laterally outward around the island pedicle utilizing iris scissors.  There was minimal undermining beneath the pedicle flap. A suspension suture was placed in the canthal tendon to prevent tension and prevent ectropion.
V-Y Plasty Text: The defect edges were debeveled with a #15 scalpel blade.  Given the location of the defect, shape of the defect and the proximity to free margins an V-Y advancement flap was deemed most appropriate.  Using a sterile surgical marker, an appropriate advancement flap was drawn incorporating the defect and placing the expected incisions within the relaxed skin tension lines where possible.    The area thus outlined was incised deep to adipose tissue with a #15 scalpel blade.  The skin margins were undermined to an appropriate distance in all directions utilizing iris scissors.
Surgical Defect Length In Cm (Optional): 0.8
Skin Substitute Text: The defect edges were debeveled with a #15 scalpel blade.  Given the location of the defect, shape of the defect and the proximity to free margins a skin substitute graft was deemed most appropriate.  The graft material was trimmed to fit the size of the defect. The graft was then placed in the primary defect and oriented appropriately.
Referring Physician (Optional): JADA Costa
Localized Dermabrasion With Wire Brush Text: The patient was draped in routine manner.  Localized dermabrasion using 3 x 17 mm wire brush was performed in routine manner to papillary dermis. This spot dermabrasion is being performed to complete skin cancer reconstruction. It also will eliminate the other sun damaged precancerous cells that are known to be part of the regional effect of a lifetime's worth of sun exposure. This localized dermabrasion is therapeutic and should not be considered cosmetic in any regard.
Rhomboid Transposition Flap Text: The defect edges were debeveled with a #15 scalpel blade.  Given the location of the defect and the proximity to free margins a rhomboid transposition flap was deemed most appropriate.  Using a sterile surgical marker, an appropriate rhomboid flap was drawn incorporating the defect.    The area thus outlined was incised deep to adipose tissue with a #15 scalpel blade.  The skin margins were undermined to an appropriate distance in all directions utilizing iris scissors.
Double O-Z Plasty Text: The defect edges were debeveled with a #15 scalpel blade.  Given the location of the defect, shape of the defect and the proximity to free margins a Double O-Z plasty (double transposition flap) was deemed most appropriate.  Using a sterile surgical marker, the appropriate transposition flaps were drawn incorporating the defect and placing the expected incisions within the relaxed skin tension lines where possible. The area thus outlined was incised deep to adipose tissue with a #15 scalpel blade.  The skin margins were undermined to an appropriate distance in all directions utilizing iris scissors.  Hemostasis was achieved with electrocautery.  The flaps were then transposed into place, one clockwise and the other counterclockwise, and anchored with interrupted buried subcutaneous sutures.

## 2019-03-11 ENCOUNTER — APPOINTMENT (RX ONLY)
Dept: URBAN - METROPOLITAN AREA CLINIC 36 | Facility: CLINIC | Age: 49
Setting detail: DERMATOLOGY
End: 2019-03-11

## 2019-03-11 DIAGNOSIS — Z48.02 ENCOUNTER FOR REMOVAL OF SUTURES: ICD-10-CM

## 2019-03-11 PROCEDURE — ? SUTURE REMOVAL (GLOBAL PERIOD)

## 2019-03-11 PROCEDURE — 99024 POSTOP FOLLOW-UP VISIT: CPT

## 2019-03-11 ASSESSMENT — LOCATION ZONE DERM: LOCATION ZONE: NOSE

## 2019-03-11 ASSESSMENT — LOCATION SIMPLE DESCRIPTION DERM: LOCATION SIMPLE: NOSE

## 2019-03-11 ASSESSMENT — LOCATION DETAILED DESCRIPTION DERM: LOCATION DETAILED: NASAL ROOT

## 2019-03-11 NOTE — PROCEDURE: SUTURE REMOVAL (GLOBAL PERIOD)
Detail Level: Detailed
Add 71371 Cpt? (Important Note: In 2017 The Use Of 42473 Is Being Tracked By Cms To Determine Future Global Period Reimbursement For Global Periods): yes

## 2019-03-12 ENCOUNTER — APPOINTMENT (RX ONLY)
Dept: URBAN - METROPOLITAN AREA CLINIC 36 | Facility: CLINIC | Age: 49
Setting detail: DERMATOLOGY
End: 2019-03-12

## 2019-03-12 DIAGNOSIS — L90.5 SCAR CONDITIONS AND FIBROSIS OF SKIN: ICD-10-CM

## 2019-03-12 PROCEDURE — ? PULSED-DYE LASER

## 2019-03-12 NOTE — PROCEDURE: PULSED-DYE LASER
Spot Size: 7 mm
Delay Time (Ms): 20
Pulse Duration: 10 ms
Post-Care Instructions: I reviewed with the patient in detail post-care instructions. Patient should stay away from the sun and wear sun protection until treated areas are fully healed.
Laser Type: Vbeam Perfecta 595nm
Treated Area: medium area
Cryogen Time (Ms): 30
Pulse Duration: 1.5 ms
Fluence In J/Cm2 (Optional): 7
Location (Required For Details To Render In Note But Body Touch Will Also Count For First Location): right cheek
Price (Use Numbers Only, No Special Characters Or $): 0
Immediate Endpoint: erythema
Post-Procedure Care: Vaseline and ice applied. Post care reviewed with patient.
Consent: Written consent obtained, risks reviewed including but not limited to crusting, scabbing, blistering, scarring, darker or lighter pigmentary change, incidental hair removal, bruising, and/or incomplete removal.
Detail Level: Zone

## 2019-03-19 ENCOUNTER — APPOINTMENT (RX ONLY)
Dept: URBAN - METROPOLITAN AREA CLINIC 36 | Facility: CLINIC | Age: 49
Setting detail: DERMATOLOGY
End: 2019-03-19

## 2019-03-19 DIAGNOSIS — R22.9 LOCALIZED SWELLING, MASS AND LUMP, UNSPECIFIED: ICD-10-CM

## 2019-03-19 PROCEDURE — ? FRAXEL RESTORE DUAL

## 2019-03-19 ASSESSMENT — LOCATION SIMPLE DESCRIPTION DERM: LOCATION SIMPLE: RIGHT CHEEK

## 2019-03-19 ASSESSMENT — LOCATION ZONE DERM: LOCATION ZONE: FACE

## 2019-03-19 ASSESSMENT — LOCATION DETAILED DESCRIPTION DERM: LOCATION DETAILED: RIGHT SUPERIOR NASAL CHEEK

## 2019-03-19 NOTE — PROCEDURE: FRAXEL RESTORE DUAL
Consent: Written consent obtained, risks reviewed including but not limited to crusting, scabbing, blistering, scarring, darker or lighter pigmentary change, and/or incomplete removal. Advised that more than one treatment may be needed to obtain results  and that the first two treatments are free but subsequent treatments will require a $50 fee per treatment.
Eye Shielding Text (Leave Blank If Unwanted- Will Be Inserted If Selecting Eye Shields): The intraocular eye shields were placed. 2 drops of intraocular tetracaine HCL ophthalmic 0.5% solution was administered. The eye shields were coated with ophthalmic bacitracin prior to insertion. After the shields were removed the eyes were flushed with normal saline.
Detail Level: Detailed
Treatment Level (Optional): 4
Price (Use Numbers Only, No Special Characters Or $): no charge for first two treatments
Energy In Mj (Optional): 50
Post-Care Instructions: I reviewed with the patient in detail post-care instructions. The patient was given written and verbal instructions for wound care. Reviewed skin hydration, soaks if needed, strict sun protection with a physical blocking sun screen. Pt is too avoid picking, excess picking can lead to scarring. Pt will be seen for follow up after treatment. Call with any concerns. Pt recommended to use biocorneum scar cream BID for up to three months.

## 2019-04-03 ENCOUNTER — APPOINTMENT (RX ONLY)
Dept: URBAN - METROPOLITAN AREA CLINIC 22 | Facility: CLINIC | Age: 49
Setting detail: DERMATOLOGY
End: 2019-04-03

## 2019-04-03 DIAGNOSIS — L82.0 INFLAMED SEBORRHEIC KERATOSIS: ICD-10-CM

## 2019-04-03 DIAGNOSIS — Q826 OTHER SPECIFIED ANOMALIES OF SKIN: ICD-10-CM

## 2019-04-03 DIAGNOSIS — L73.8 OTHER SPECIFIED FOLLICULAR DISORDERS: ICD-10-CM

## 2019-04-03 DIAGNOSIS — Z85.828 PERSONAL HISTORY OF OTHER MALIGNANT NEOPLASM OF SKIN: ICD-10-CM

## 2019-04-03 DIAGNOSIS — Q828 OTHER SPECIFIED ANOMALIES OF SKIN: ICD-10-CM

## 2019-04-03 DIAGNOSIS — Z71.89 OTHER SPECIFIED COUNSELING: ICD-10-CM

## 2019-04-03 DIAGNOSIS — Q819 OTHER SPECIFIED ANOMALIES OF SKIN: ICD-10-CM

## 2019-04-03 PROBLEM — Q82.8 OTHER SPECIFIED CONGENITAL MALFORMATIONS OF SKIN: Status: ACTIVE | Noted: 2019-04-03

## 2019-04-03 PROCEDURE — ? COUNSELING

## 2019-04-03 PROCEDURE — 99214 OFFICE O/P EST MOD 30 MIN: CPT | Mod: 25

## 2019-04-03 PROCEDURE — ? LIQUID NITROGEN

## 2019-04-03 PROCEDURE — 17110 DESTRUCTION B9 LES UP TO 14: CPT

## 2019-04-03 PROCEDURE — ? TREATMENT REGIMEN

## 2019-04-03 ASSESSMENT — LOCATION SIMPLE DESCRIPTION DERM
LOCATION SIMPLE: SUPERIOR FOREHEAD
LOCATION SIMPLE: NOSE
LOCATION SIMPLE: RIGHT BREAST
LOCATION SIMPLE: CHEST
LOCATION SIMPLE: LEFT POSTERIOR UPPER ARM
LOCATION SIMPLE: RIGHT POSTERIOR UPPER ARM

## 2019-04-03 ASSESSMENT — LOCATION DETAILED DESCRIPTION DERM
LOCATION DETAILED: RIGHT PROXIMAL POSTERIOR UPPER ARM
LOCATION DETAILED: NASAL ROOT
LOCATION DETAILED: SUPERIOR MID FOREHEAD
LOCATION DETAILED: RIGHT MEDIAL BREAST 1-2:00 REGION
LOCATION DETAILED: LEFT DISTAL POSTERIOR UPPER ARM
LOCATION DETAILED: UPPER STERNUM

## 2019-04-03 ASSESSMENT — LOCATION ZONE DERM
LOCATION ZONE: NOSE
LOCATION ZONE: ARM
LOCATION ZONE: FACE
LOCATION ZONE: TRUNK

## 2019-04-03 NOTE — PROCEDURE: LIQUID NITROGEN
Include Z78.9 (Other Specified Conditions Influencing Health Status) As An Associated Diagnosis?: No
Post-Care Instructions: I reviewed with the patient in detail post-care instructions. Patient is to wear sunprotection, and avoid picking at any of the treated lesions. Pt may apply Vaseline to crusted or scabbing areas.
Number Of Freeze-Thaw Cycles: 3 freeze-thaw cycles
Medical Necessity Clause: This procedure was medically necessary because the lesions that were treated were:
Consent: The patient's consent was obtained including but not limited to risks of crusting, scabbing, blistering, scarring, darker or lighter pigmentary change, recurrence, incomplete removal and infection.
Detail Level: Detailed
Medical Necessity Information: It is in your best interest to select a reason for this procedure from the list below. All of these items fulfill various CMS LCD requirements except the new and changing color options.

## 2019-04-08 ENCOUNTER — APPOINTMENT (RX ONLY)
Dept: URBAN - METROPOLITAN AREA CLINIC 36 | Facility: CLINIC | Age: 49
Setting detail: DERMATOLOGY
End: 2019-04-08

## 2019-04-08 DIAGNOSIS — R22.9 LOCALIZED SWELLING, MASS AND LUMP, UNSPECIFIED: ICD-10-CM

## 2019-04-08 PROCEDURE — ? FRAXEL RESTORE DUAL

## 2019-04-08 ASSESSMENT — LOCATION SIMPLE DESCRIPTION DERM: LOCATION SIMPLE: RIGHT CHEEK

## 2019-04-08 ASSESSMENT — LOCATION ZONE DERM: LOCATION ZONE: FACE

## 2019-04-08 ASSESSMENT — LOCATION DETAILED DESCRIPTION DERM: LOCATION DETAILED: RIGHT SUPERIOR NASAL CHEEK

## 2019-04-08 NOTE — PROCEDURE: FRAXEL RESTORE DUAL
Passes (Optional): 4
Price (Use Numbers Only, No Special Characters Or $): no charge for first two treatments
Consent: Written consent obtained, risks reviewed including but not limited to crusting, scabbing, blistering, scarring, darker or lighter pigmentary change, and/or incomplete removal. Advised that more than one treatment may be needed to obtain results  and that the first two treatments are free but subsequent treatments will require a $50 fee per treatment.
Post-Care Instructions: I reviewed with the patient in detail post-care instructions. The patient was given written and verbal instructions for wound care. Reviewed skin hydration, soaks if needed, strict sun protection with a physical blocking sun screen. Pt is too avoid picking, excess picking can lead to scarring. Pt will be seen for follow up after treatment. Call with any concerns. Pt recommended to use biocorneum scar cream BID for up to three months.
Energy In Mj (Optional): 50
Eye Shielding Text (Leave Blank If Unwanted- Will Be Inserted If Selecting Eye Shields): The intraocular eye shields were placed. 2 drops of intraocular tetracaine HCL ophthalmic 0.5% solution was administered. The eye shields were coated with ophthalmic bacitracin prior to insertion. After the shields were removed the eyes were flushed with normal saline.
Detail Level: Detailed

## 2019-04-09 ENCOUNTER — RX ONLY (OUTPATIENT)
Age: 49
Setting detail: RX ONLY
End: 2019-04-09

## 2019-04-09 RX ORDER — TRETINOIN 0.5 MG/G
CREAM TOPICAL
Qty: 1 | Refills: 11 | Status: ERX

## 2019-04-16 ENCOUNTER — NON-PROVIDER VISIT (OUTPATIENT)
Dept: HEALTH INFORMATION MANAGEMENT | Facility: MEDICAL CENTER | Age: 49
End: 2019-04-16
Payer: COMMERCIAL

## 2019-04-16 VITALS — BODY MASS INDEX: 48.82 KG/M2 | WEIGHT: 293 LBS | HEIGHT: 65 IN

## 2019-04-16 DIAGNOSIS — Z98.890 S/P GASTRIC SURGERY: ICD-10-CM

## 2019-04-16 PROCEDURE — 97802 MEDICAL NUTRITION INDIV IN: CPT | Performed by: DIETITIAN, REGISTERED

## 2019-04-16 NOTE — PROGRESS NOTES
"4/16/2019    Manuel Barbosa D.O.  49 y.o.   Time in/out: 1:00-1:43 pm     Anthropometrics/Objective  Vitals:    04/16/19 1352   Weight: (!) 138.2 kg (304 lb 9.6 oz)   Height: 1.651 m (5' 5\")       Body mass index is 50.69 kg/m².    Stated Goal Weight: 200 lbs  Estimated Caloric needs 2000 Kcal   See comprehensive patient history form for further information     Subjective:  - s/p removal of gastric band due to complications, did not have conversion to sleeve  - wants to build a better lifestyle, improve her labs, and lose weight  -  present for today's visit and supportive of her goal  - they own a restaurants, having food always readily available is a barrier to weight loss  - going to piliates, stopped for a bit after having skin cancer removal, but ready to start again consistently   - snacks a lot   - soda, chocolate milk, ginger ale, tea regularly, has begun to wean down on soda      Nutrition Diagnosis (PES Statement)  · Obesity related to excessive energy intake and inadequate energy energy expenditure as evidenced by BMI of 50.7    Client history:  Condition(s) associated with a diagnosis or treatment (specify) HTN, s/p gastric band, vitamin D deficiency, Hyperlipidemia     Biochemical data, medical test and procedures  No results found for: HBA1C@  No results found for: POCGLUCOSE  Lab Results   Component Value Date/Time    CHOLSTRLTOT 218 (H) 06/13/2018 07:33 AM     (H) 06/13/2018 07:33 AM    HDL 35 (A) 06/13/2018 07:33 AM    TRIGLYCERIDE 113 06/13/2018 07:33 AM         Nutrition Intervention  Nutrition Prescription  Recommended Daily Kcals 2000    Meal and Snack  Recommend a general/healthful diet    Comprehensive Nutrition education Instruction or training leading to in-depth nutrition related knowledge about:  Combine carb, protein and fat at each meal, Eating out, Fast food, Meal timing and spacing, Metabolism of carb, protein, fat, Physical activity/exercise, Portion control, Sweets " and alcohol in moderation, Heart-healthy guidelines, Label Reading and Handouts provided regarding topics discussed include:   - Plate Planner   - Nutrition Basics   - Snack ideas (1 oz protein + NS veggies)   - My Fitness Pal How To Guide     Monitoring & Evaluation Plan    Behavioral-Environmental:  Behavior: Keep a food journal   Physical activity: Resume regularly scheduled piliates classes (2-3 days a week)     Food / Nutrient Intake:  Fluid/Beverage intake: Avoid sweetened beverages   Food intake: Follow the plate method for meal balance and portion control, reach for protein snacks + veggies or fruit instead of refined carbs     Physical Signs / Symptoms:  Lipid profiles WNL and Weight change 1-2 lbs a week     Assessment Notes:  Unfortunately the pt had complications related to the lap band and had it removed. She is not interested in other bariatric surgery options and is looking to focus on making diet and lifestyle changes. Pamela is consuming excess calories from sugar sweetened beverages, large portions, and frequently reaching for processed carbohydrate and empty calorie snacks. I reviewed the plate method and nutrition basics with her and her  for a better understanding of meal balance and portion control. Provided suggested serving sizes and how to use the hand method to estimate portion sizes. Logan was interested in receiving a meal plan, but I explained to her that this never actually teaches her to make better choices and learn if she is just following a plan someone has written for her. Instead she is going to try using My Fitness pal aiming for 2000 calories a day and we will review this on her next visit as well as adjust her goals accordingly. Recommended continuing to reduce intake of sugar sweetened beverages including chocolate milk and reach for water or other 0 calorie beverage.     Goals:  1) track on MyFitnessPal 3-5 days of the week, bring to next visit  2) resume piliates  routine 2-3 days a week (don't click to skip class)     Follow up: 1 month

## 2019-04-23 ENCOUNTER — APPOINTMENT (RX ONLY)
Dept: URBAN - METROPOLITAN AREA CLINIC 36 | Facility: CLINIC | Age: 49
Setting detail: DERMATOLOGY
End: 2019-04-23

## 2019-04-23 DIAGNOSIS — R22.9 LOCALIZED SWELLING, MASS AND LUMP, UNSPECIFIED: ICD-10-CM

## 2019-04-23 PROCEDURE — ? INTRALESIONAL KENALOG

## 2019-04-23 PROCEDURE — 11900 INJECT SKIN LESIONS </W 7: CPT

## 2019-04-23 ASSESSMENT — LOCATION SIMPLE DESCRIPTION DERM: LOCATION SIMPLE: NOSE

## 2019-04-23 ASSESSMENT — LOCATION DETAILED DESCRIPTION DERM: LOCATION DETAILED: NASAL ROOT

## 2019-04-23 ASSESSMENT — LOCATION ZONE DERM: LOCATION ZONE: NOSE

## 2019-04-23 NOTE — PROCEDURE: INTRALESIONAL KENALOG
Lot # For Kenalog (Optional): [id number]
Consent: The risks of atrophy were reviewed with the patient.
Ndc# For Kenalog Only: 1111-5329-01
Detail Level: Detailed
Kenalog Preparation: Kenalog
Total Volume (Ccs): 0.2
Medical Necessity Clause: This procedure was medically necessary because the lesions that were treated were:
Expiration Date For Kenalog (Optional): April/2019
Administered By (Optional): Lenka Gray M.D.
X Size Of Lesion In Cm (Optional): 0
Include Z78.9 (Other Specified Conditions Influencing Health Status) As An Associated Diagnosis?: No
Treatment Number (Optional): 1
Concentration Of Kenalog Solution Injected (Mg/Ml): 10.0

## 2019-04-26 ENCOUNTER — OFFICE VISIT (OUTPATIENT)
Dept: MEDICAL GROUP | Facility: LAB | Age: 49
End: 2019-04-26
Payer: COMMERCIAL

## 2019-04-26 VITALS
TEMPERATURE: 98.6 F | RESPIRATION RATE: 12 BRPM | OXYGEN SATURATION: 93 % | HEIGHT: 65 IN | HEART RATE: 78 BPM | WEIGHT: 293 LBS | BODY MASS INDEX: 48.82 KG/M2 | SYSTOLIC BLOOD PRESSURE: 130 MMHG | DIASTOLIC BLOOD PRESSURE: 76 MMHG

## 2019-04-26 DIAGNOSIS — E66.01 MORBID OBESITY (HCC): ICD-10-CM

## 2019-04-26 DIAGNOSIS — F41.9 ANXIETY: ICD-10-CM

## 2019-04-26 DIAGNOSIS — D04.9 BASAL CELL CARCINOMA IN SITU OF SKIN: ICD-10-CM

## 2019-04-26 DIAGNOSIS — I10 ESSENTIAL HYPERTENSION: ICD-10-CM

## 2019-04-26 DIAGNOSIS — E55.9 VITAMIN D DEFICIENCY: ICD-10-CM

## 2019-04-26 PROCEDURE — 99214 OFFICE O/P EST MOD 30 MIN: CPT | Performed by: INTERNAL MEDICINE

## 2019-04-26 RX ORDER — MAGNESIUM OXIDE 400 MG/1
400 TABLET ORAL DAILY
COMMUNITY
End: 2021-10-25

## 2019-04-26 RX ORDER — PAROXETINE HYDROCHLORIDE 20 MG/1
20 TABLET, FILM COATED ORAL EVERY EVENING
Qty: 90 TAB | Refills: 3 | Status: SHIPPED | OUTPATIENT
Start: 2019-04-26 | End: 2021-10-25

## 2019-04-26 RX ORDER — CALCIUM CARBONATE 500(1250)
1000 TABLET ORAL 2 TIMES DAILY WITH MEALS
COMMUNITY
End: 2021-10-25

## 2019-04-26 RX ORDER — BIOTIN 10 MG
TABLET ORAL
COMMUNITY
End: 2021-10-25

## 2019-04-26 RX ORDER — CARVEDILOL 6.25 MG/1
TABLET ORAL
Qty: 180 TAB | Refills: 3 | Status: SHIPPED | OUTPATIENT
Start: 2019-04-26 | End: 2020-06-08

## 2019-04-26 ASSESSMENT — PATIENT HEALTH QUESTIONNAIRE - PHQ9: CLINICAL INTERPRETATION OF PHQ2 SCORE: 0

## 2019-04-26 NOTE — PROGRESS NOTES
CC: Logan Mahoney is a 49 y.o. female is suffering from   Chief Complaint   Patient presents with   • Other     follow up for skin cancer         SUBJECTIVE:  1. Morbid obesity (HCC)  Patient is here for follow-up has a history of morbid obesity is doing well is being followed by a nutritionist    2. Anxiety  Patient is on Paxil because of history of anxiety, works as a  recently had butted by 1 of her children inadvertently    3. Essential hypertension  History of hypertension not ideally controlled, patient continues on Coreg    4. Vitamin D deficiency  Screening for vitamin D ordered    5. Basal cell carcinoma in situ of skin  Basal cell carcinoma of the right nasolabial fold was resected successfully        Past social, family, history:   Social History   Substance Use Topics   • Smoking status: Never Smoker   • Smokeless tobacco: Never Used   • Alcohol use No         MEDICATIONS:    Current Outpatient Prescriptions:   •  magnesium oxide (MAG-OX) 400 MG Tab, Take 400 mg by mouth every day., Disp: , Rfl:   •  calcium carbonate (OS-JUVENAL 500) 500 MG Tab, Take 1,000 mg by mouth 2 times a day, with meals., Disp: , Rfl:   •  Cyanocobalamin (VITAMIN B-12) 3000 MCG SL Tab, Place  under tongue., Disp: , Rfl:   •  vitamin D (CHOLECALCIFEROL) 1000 UNIT Tab, Take 1,000 Units by mouth every day., Disp: , Rfl:   •  Omega 3-6-9 Fatty Acids (TRIPLE OMEGA-3-6-9) Cap, Take  by mouth., Disp: , Rfl:   •  PARoxetine (PAXIL) 20 MG Tab, Take 1 Tab by mouth every evening., Disp: 90 Tab, Rfl: 3  •  carvedilol (COREG) 6.25 MG Tab, TAKE 1 TABLET BY MOUTH TWICE DAILY WITH MEALS, Disp: 180 Tab, Rfl: 3  •  NON SPECIFIED, Nocturnal desaturation study., Disp: 1 Each, Rfl: 0  •  promethazine-dextromethorphan (PROMETHAZINE-DM) 6.25-15 MG/5ML syrup, Take 5 mL by mouth 4 times a day as needed for Cough., Disp: 140 mL, Rfl: 0  •  multivitamin (THERAGRAN) Tab, Take 1 Tab by mouth every day., Disp: , Rfl:   •   "vitamin D, Ergocalciferol, (DRISDOL) 70745 UNIT CAPS capsule, Take 1,000 Units by mouth every evening., Disp: , Rfl:     PROBLEMS:  Patient Active Problem List    Diagnosis Date Noted   • Preop cardiovascular exam 07/20/2012     Priority: High   • Nonspecific abnormal electrocardiogram (ECG) (EKG) 07/20/2012     Priority: High   • Benign hypertension 07/20/2012     Priority: Medium   • Vitamin D deficiency disease 12/02/2016   • Morbid obesity (HCC) 05/31/2016   • S/P gastric surgery 04/18/2016       REVIEW OF SYSTEMS:  Gen.:  No Nausea, Vomiting, fever, Chills.  Heart: No chest pain.  Lungs:  No shortness of Breath.  Psychological: Rinku unusual Anxiety depression     PHYSICAL EXAM   Constitutional: Alert, cooperative, not in acute distress.  Cardiovascular:  Rate Rhythm is regular without murmurs rubs clicks.     Thorax & Lungs: Clear to auscultation, no wheezing, rhonchi, or rales  HENT: Normocephalic, Atraumatic.  Eyes: PERRLA, EOMI, Conjunctiva normal.   Neck: Trachia is midline no swelling of the thyroid.   Lymphatic: No lymphadenopathy noted.   Abdomin: Soft non-tender, no rebound, no guarding.   Neurologic: Alert & oriented x 3, cranial nerves II through XII are intact, Normal motor function, Normal sensory function, No focal deficits noted.   Psychiatric: Affect normal, Judgment normal, Mood normal.     VITAL SIGNS:/76   Pulse 78   Temp 37 °C (98.6 °F) (Temporal)   Resp 12   Ht 1.638 m (5' 4.5\")   Wt (!) 138.8 kg (306 lb)   LMP 03/01/2016   SpO2 93%   BMI 51.71 kg/m²     Labs: Reviewed    Assessment:                                                     Plan:    1. Morbid obesity (HCC)  Patient to undergo nocturnal desaturation study orders written recheck CBC CMP lipid profile in 3 months  - NON SPECIFIED; Nocturnal desaturation study.  Dispense: 1 Each; Refill: 0  - CBC WITH DIFFERENTIAL; Future  - Comp Metabolic Panel; Future  - Lipid Profile; Future    2. Anxiety  Continue Paxil  - " PARoxetine (PAXIL) 20 MG Tab; Take 1 Tab by mouth every evening.  Dispense: 90 Tab; Refill: 3    3. Essential hypertension  Continue current medication  - carvedilol (COREG) 6.25 MG Tab; TAKE 1 TABLET BY MOUTH TWICE DAILY WITH MEALS  Dispense: 180 Tab; Refill: 3    4. Vitamin D deficiency  Recheck vitamin D  - VITAMIN D,25 HYDROXY; Future    5. Basal cell carcinoma in situ of skin  [facility] report under media

## 2019-05-13 ENCOUNTER — OFFICE VISIT (OUTPATIENT)
Dept: URGENT CARE | Facility: PHYSICIAN GROUP | Age: 49
End: 2019-05-13
Payer: COMMERCIAL

## 2019-05-13 VITALS
BODY MASS INDEX: 48.82 KG/M2 | HEIGHT: 65 IN | SYSTOLIC BLOOD PRESSURE: 120 MMHG | WEIGHT: 293 LBS | HEART RATE: 75 BPM | TEMPERATURE: 98 F | DIASTOLIC BLOOD PRESSURE: 90 MMHG | OXYGEN SATURATION: 93 %

## 2019-05-13 DIAGNOSIS — J30.1 SEASONAL ALLERGIC RHINITIS DUE TO POLLEN: ICD-10-CM

## 2019-05-13 DIAGNOSIS — B37.9 CANDIDA INFECTION: ICD-10-CM

## 2019-05-13 DIAGNOSIS — J45.909 EXTRINSIC ASTHMA, UNSPECIFIED ASTHMA SEVERITY, UNSPECIFIED WHETHER COMPLICATED, UNSPECIFIED WHETHER PERSISTENT: ICD-10-CM

## 2019-05-13 PROCEDURE — 99214 OFFICE O/P EST MOD 30 MIN: CPT | Performed by: PHYSICIAN ASSISTANT

## 2019-05-13 RX ORDER — FLUTICASONE PROPIONATE 50 MCG
2 SPRAY, SUSPENSION (ML) NASAL DAILY
Qty: 16 G | Refills: 0 | Status: SHIPPED | OUTPATIENT
Start: 2019-05-13 | End: 2021-10-25

## 2019-05-13 RX ORDER — NYSTATIN AND TRIAMCINOLONE ACETONIDE 100000; 1 [USP'U]/G; MG/G
1 OINTMENT TOPICAL 2 TIMES DAILY
Qty: 1 TUBE | Refills: 0 | Status: SHIPPED | OUTPATIENT
Start: 2019-05-13 | End: 2021-10-25

## 2019-05-13 RX ORDER — CETIRIZINE HYDROCHLORIDE 10 MG/1
10 TABLET ORAL DAILY
Qty: 30 TAB | Refills: 0 | Status: SHIPPED | OUTPATIENT
Start: 2019-05-13 | End: 2021-10-25

## 2019-05-13 RX ORDER — ALBUTEROL SULFATE 90 UG/1
2 AEROSOL, METERED RESPIRATORY (INHALATION) EVERY 6 HOURS PRN
Qty: 8.5 G | Refills: 0 | Status: SHIPPED | OUTPATIENT
Start: 2019-05-13 | End: 2020-02-17

## 2019-05-13 ASSESSMENT — ENCOUNTER SYMPTOMS
COUGH: 1
NAUSEA: 0
SINUS PAIN: 1
VOMITING: 0

## 2019-05-13 NOTE — PROGRESS NOTES
Subjective:   Logan Mahoney is a 49 y.o. female who presents for URI (x 3 days, rash under left breast x 10 days)        Rash   This is a new problem. The current episode started 1 to 4 weeks ago. The problem is unchanged. The affected locations include the torso (under left breast). The rash is characterized by redness, itchiness and pain. She was exposed to nothing. Associated symptoms include congestion and coughing. Pertinent negatives include no vomiting. Treatments tried: deodorant. The treatment provided no relief. Her past medical history is significant for allergies.   URI    This is a new problem. The current episode started in the past 7 days. The problem has been unchanged. There has been no fever. Associated symptoms include congestion, coughing, ear pain, a plugged ear sensation, a rash and sinus pain. Pertinent negatives include no nausea, sneezing or vomiting. She has tried antihistamine and NSAIDs for the symptoms. The treatment provided moderate relief.     Review of Systems   HENT: Positive for congestion, ear pain and sinus pain. Negative for sneezing.    Respiratory: Positive for cough.    Gastrointestinal: Negative for nausea and vomiting.   Skin: Positive for rash.       PMH:  has a past medical history of Anemia; Anesthesia; Arthritis; Benign hypertension (7/20/2012); Heart burn; Morbid obesity (HCC) (5/31/2016); Nonspecific abnormal electrocardiogram (ECG) (EKG) (7/20/2012); Preop cardiovascular exam (7/20/2012); S/P gastric surgery (4/18/2016); and Vitamin D deficiency disease (12/2/2016).  MEDS:   Current Outpatient Prescriptions:   •  nystatin-triamcinalone (MYCOLOG) 579414-2.1 UNIT/GM-% Ointment, Apply 1 Units to affected area(s) 2 times a day., Disp: 1 Tube, Rfl: 0  •  cetirizine (ZYRTEC) 10 MG Tab, Take 1 Tab by mouth every day., Disp: 30 Tab, Rfl: 0  •  fluticasone (FLONASE) 50 MCG/ACT nasal spray, Spray 2 Sprays in nose every day., Disp: 16 g, Rfl: 0  •  albuterol 108 (90  Base) MCG/ACT Aero Soln inhalation aerosol, Inhale 2 Puffs by mouth every 6 hours as needed for Shortness of Breath., Disp: 8.5 g, Rfl: 0  •  magnesium oxide (MAG-OX) 400 MG Tab, Take 400 mg by mouth every day., Disp: , Rfl:   •  calcium carbonate (OS-JUVENAL 500) 500 MG Tab, Take 1,000 mg by mouth 2 times a day, with meals., Disp: , Rfl:   •  Cyanocobalamin (VITAMIN B-12) 3000 MCG SL Tab, Place  under tongue., Disp: , Rfl:   •  vitamin D (CHOLECALCIFEROL) 1000 UNIT Tab, Take 1,000 Units by mouth every day., Disp: , Rfl:   •  Omega 3-6-9 Fatty Acids (TRIPLE OMEGA-3-6-9) Cap, Take  by mouth., Disp: , Rfl:   •  PARoxetine (PAXIL) 20 MG Tab, Take 1 Tab by mouth every evening., Disp: 90 Tab, Rfl: 3  •  carvedilol (COREG) 6.25 MG Tab, TAKE 1 TABLET BY MOUTH TWICE DAILY WITH MEALS, Disp: 180 Tab, Rfl: 3  •  multivitamin (THERAGRAN) Tab, Take 1 Tab by mouth every day., Disp: , Rfl:   •  vitamin D, Ergocalciferol, (DRISDOL) 66823 UNIT CAPS capsule, Take 1,000 Units by mouth every evening., Disp: , Rfl:   •  NON SPECIFIED, Nocturnal desaturation study., Disp: 1 Each, Rfl: 0  •  promethazine-dextromethorphan (PROMETHAZINE-DM) 6.25-15 MG/5ML syrup, Take 5 mL by mouth 4 times a day as needed for Cough., Disp: 140 mL, Rfl: 0  ALLERGIES:   Allergies   Allergen Reactions   • Ceclor [Cefaclor] Vomiting   • Cinnamon Vomiting   • Eggs Vomiting   • Latex Hives   • Peppers [Pepper-Bell Food Allergy] Vomiting   • Pineapple Hives and Nausea     Only skin     SURGHX:   Past Surgical History:   Procedure Laterality Date   • LAPAROSCOPIC BAND REMOVAL  11/5/2018    Procedure: LAPAROSCOPIC BAND REMOVAL- GASTRIC BAND and Port;  Surgeon: John H Ganser, M.D.;  Location: SURGERY [facility];  Service: General   • OTHER ABDOMINAL SURGERY  2013    gastric band w/port   • KNEE ARTHROSCOPY  1990    Arthroscopy, Knee R   • TONSILLECTOMY       SOCHX:  reports that she has never smoked. She has never used smokeless tobacco. She reports that she does not  "drink alcohol or use drugs.  FH: Family history was reviewed, no pertinent findings to report   Objective:   /90   Pulse 75   Temp 36.7 °C (98 °F) (Temporal)   Ht 1.638 m (5' 4.5\")   Wt (!) 138.8 kg (306 lb)   LMP 03/01/2016   SpO2 93%   BMI 51.71 kg/m²   Physical Exam   Constitutional: Vital signs are normal. She appears well-developed and well-nourished.  Non-toxic appearance. No distress.   HENT:   Head: Normocephalic and atraumatic.   Right Ear: Tympanic membrane, external ear and ear canal normal.   Left Ear: Tympanic membrane, external ear and ear canal normal.   Nose: Mucosal edema and rhinorrhea present. Right sinus exhibits maxillary sinus tenderness. Left sinus exhibits maxillary sinus tenderness.   Mouth/Throat: Uvula is midline and mucous membranes are normal. Posterior oropharyngeal erythema present.   Significant congestion.    Eyes: Lids are normal.   Neck: Neck supple.   Pulmonary/Chest: Effort normal. No respiratory distress.   Neurological: She is alert.   Skin: Skin is warm, dry and intact. Capillary refill takes less than 2 seconds.   Beefy red maculopapular rash under left breast fold with satellite lesions. There is some cracking of the skin. No discharge.   Psychiatric: She has a normal mood and affect. Her speech is normal and behavior is normal. Judgment and thought content normal. Cognition and memory are normal.   Vitals reviewed.        Assessment/Plan:   1. Candida infection  - nystatin-triamcinalone (MYCOLOG) 430995-2.1 UNIT/GM-% Ointment; Apply 1 Units to affected area(s) 2 times a day.  Dispense: 1 Tube; Refill: 0    2. Seasonal allergic rhinitis due to pollen  - cetirizine (ZYRTEC) 10 MG Tab; Take 1 Tab by mouth every day.  Dispense: 30 Tab; Refill: 0  - fluticasone (FLONASE) 50 MCG/ACT nasal spray; Spray 2 Sprays in nose every day.  Dispense: 16 g; Refill: 0    3. Extrinsic asthma, unspecified asthma severity, unspecified whether complicated, unspecified whether " persistent  - albuterol 108 (90 Base) MCG/ACT Aero Soln inhalation aerosol; Inhale 2 Puffs by mouth every 6 hours as needed for Shortness of Breath.  Dispense: 8.5 g; Refill: 0    Differential diagnosis, natural history, supportive care, and indications for immediate follow-up discussed.

## 2019-05-16 ENCOUNTER — NON-PROVIDER VISIT (OUTPATIENT)
Dept: HEALTH INFORMATION MANAGEMENT | Facility: MEDICAL CENTER | Age: 49
End: 2019-05-16
Payer: COMMERCIAL

## 2019-05-16 VITALS — HEIGHT: 65 IN | BODY MASS INDEX: 48.82 KG/M2 | WEIGHT: 293 LBS

## 2019-05-16 DIAGNOSIS — Z98.890 S/P GASTRIC SURGERY: ICD-10-CM

## 2019-05-16 PROCEDURE — 97803 MED NUTRITION INDIV SUBSEQ: CPT | Performed by: DIETITIAN, REGISTERED

## 2019-05-16 NOTE — PROGRESS NOTES
"Nutrition Reassess    5/16/2019    Manuel Barbosa D.O.   49 y.o.   Time In/Out: 11:32-11:50 am       Subjective:  - was on track with eating and tracking first week, but then had significant amount of stress at work under investigation due to alligations and a hostile work environment  - due to above, struggled to get to piliates twice a week  - found tracking very helpful and eye opening especially to realize how many calories are in some of the foods she has been eating    Anthropometrics/Objective    Vitals:    05/16/19 1211   Weight: (!) 137.3 kg (302 lb 11.2 oz)   Height: 1.651 m (5' 5\")     Body mass index is 50.37 kg/m².      Previous weight/date: 304.6 lbs (4/16/19)   Change: -1.9 lbs         Stated Goal Weight: 200 lbs    ReAssesment/Notes:  Logan had a difficult time consistently tracking due to stress at work, but from the time she did track initially learned a lot about her current portion sizes and caloric density of some of the foods she was choosing. She has begun measuring out her portion sizes especially for foods like cereal. Reviewed her [employer] peg from when she tracked and recommended she work towards increasing her protein intake and better balancing breakfast s this sets her up for the remainder of the day. This should in return help her to reduce her total carb intake as well especially from those that are processed and increase satiety. Discussed option of resuming her protein shakes she has from gastric band or premier protein which she was interested in and would also help her incorporate more protein in her breakfast. She would like to continue her previous goals and has agreed to the following    Plan:  1) 1 soda a day (currently drinking 2 regular)  2) piliates 2x a week  3) increase protein, snacks and breakfast, shake if desired  4) track intake as able and finding it beneficial     Follow-up: 1 month     "

## 2019-05-17 ENCOUNTER — OFFICE VISIT (OUTPATIENT)
Dept: MEDICAL GROUP | Facility: LAB | Age: 49
End: 2019-05-17
Payer: COMMERCIAL

## 2019-05-17 ENCOUNTER — HOSPITAL ENCOUNTER (OUTPATIENT)
Dept: LAB | Facility: MEDICAL CENTER | Age: 49
End: 2019-05-17
Attending: INTERNAL MEDICINE
Payer: COMMERCIAL

## 2019-05-17 VITALS
DIASTOLIC BLOOD PRESSURE: 72 MMHG | OXYGEN SATURATION: 92 % | BODY MASS INDEX: 48.82 KG/M2 | HEIGHT: 65 IN | RESPIRATION RATE: 12 BRPM | SYSTOLIC BLOOD PRESSURE: 130 MMHG | WEIGHT: 293 LBS | TEMPERATURE: 97.9 F | HEART RATE: 68 BPM

## 2019-05-17 DIAGNOSIS — E66.01 MORBID OBESITY (HCC): ICD-10-CM

## 2019-05-17 DIAGNOSIS — R73.02 IGT (IMPAIRED GLUCOSE TOLERANCE): ICD-10-CM

## 2019-05-17 DIAGNOSIS — E55.9 VITAMIN D DEFICIENCY: ICD-10-CM

## 2019-05-17 DIAGNOSIS — J06.9 UPPER RESPIRATORY TRACT INFECTION, UNSPECIFIED TYPE: ICD-10-CM

## 2019-05-17 LAB
25(OH)D3 SERPL-MCNC: 29 NG/ML (ref 30–100)
ALBUMIN SERPL BCP-MCNC: 3.6 G/DL (ref 3.2–4.9)
ALBUMIN/GLOB SERPL: 1.1 G/DL
ALP SERPL-CCNC: 94 U/L (ref 30–99)
ALT SERPL-CCNC: 30 U/L (ref 2–50)
ANION GAP SERPL CALC-SCNC: 9 MMOL/L (ref 0–11.9)
AST SERPL-CCNC: 28 U/L (ref 12–45)
BASOPHILS # BLD AUTO: 0.7 % (ref 0–1.8)
BASOPHILS # BLD: 0.07 K/UL (ref 0–0.12)
BILIRUB SERPL-MCNC: 0.4 MG/DL (ref 0.1–1.5)
BUN SERPL-MCNC: 12 MG/DL (ref 8–22)
CALCIUM SERPL-MCNC: 9.3 MG/DL (ref 8.5–10.5)
CHLORIDE SERPL-SCNC: 100 MMOL/L (ref 96–112)
CHOLEST SERPL-MCNC: 187 MG/DL (ref 100–199)
CO2 SERPL-SCNC: 27 MMOL/L (ref 20–33)
CREAT SERPL-MCNC: 0.68 MG/DL (ref 0.5–1.4)
EOSINOPHIL # BLD AUTO: 0.29 K/UL (ref 0–0.51)
EOSINOPHIL NFR BLD: 2.9 % (ref 0–6.9)
ERYTHROCYTE [DISTWIDTH] IN BLOOD BY AUTOMATED COUNT: 43.4 FL (ref 35.9–50)
GLOBULIN SER CALC-MCNC: 3.4 G/DL (ref 1.9–3.5)
GLUCOSE SERPL-MCNC: 278 MG/DL (ref 65–99)
HCT VFR BLD AUTO: 48.7 % (ref 37–47)
HDLC SERPL-MCNC: 34 MG/DL
HGB BLD-MCNC: 15.3 G/DL (ref 12–16)
IMM GRANULOCYTES # BLD AUTO: 0.04 K/UL (ref 0–0.11)
IMM GRANULOCYTES NFR BLD AUTO: 0.4 % (ref 0–0.9)
LDLC SERPL CALC-MCNC: 122 MG/DL
LYMPHOCYTES # BLD AUTO: 2.4 K/UL (ref 1–4.8)
LYMPHOCYTES NFR BLD: 23.9 % (ref 22–41)
MCH RBC QN AUTO: 29 PG (ref 27–33)
MCHC RBC AUTO-ENTMCNC: 31.4 G/DL (ref 33.6–35)
MCV RBC AUTO: 92.2 FL (ref 81.4–97.8)
MONOCYTES # BLD AUTO: 0.65 K/UL (ref 0–0.85)
MONOCYTES NFR BLD AUTO: 6.5 % (ref 0–13.4)
NEUTROPHILS # BLD AUTO: 6.6 K/UL (ref 2–7.15)
NEUTROPHILS NFR BLD: 65.6 % (ref 44–72)
NRBC # BLD AUTO: 0 K/UL
NRBC BLD-RTO: 0 /100 WBC
PLATELET # BLD AUTO: 277 K/UL (ref 164–446)
PMV BLD AUTO: 10.3 FL (ref 9–12.9)
POTASSIUM SERPL-SCNC: 4.4 MMOL/L (ref 3.6–5.5)
PROT SERPL-MCNC: 7 G/DL (ref 6–8.2)
RBC # BLD AUTO: 5.28 M/UL (ref 4.2–5.4)
SODIUM SERPL-SCNC: 136 MMOL/L (ref 135–145)
TRIGL SERPL-MCNC: 153 MG/DL (ref 0–149)
WBC # BLD AUTO: 10.1 K/UL (ref 4.8–10.8)

## 2019-05-17 PROCEDURE — 36415 COLL VENOUS BLD VENIPUNCTURE: CPT

## 2019-05-17 PROCEDURE — 99214 OFFICE O/P EST MOD 30 MIN: CPT | Performed by: INTERNAL MEDICINE

## 2019-05-17 PROCEDURE — 80053 COMPREHEN METABOLIC PANEL: CPT

## 2019-05-17 PROCEDURE — 80061 LIPID PANEL: CPT

## 2019-05-17 PROCEDURE — 85025 COMPLETE CBC W/AUTO DIFF WBC: CPT

## 2019-05-17 PROCEDURE — 82306 VITAMIN D 25 HYDROXY: CPT

## 2019-05-17 RX ORDER — AMOXICILLIN AND CLAVULANATE POTASSIUM 875; 125 MG/1; MG/1
1 TABLET, FILM COATED ORAL 2 TIMES DAILY
Qty: 20 TAB | Refills: 0 | Status: SHIPPED | OUTPATIENT
Start: 2019-05-17 | End: 2020-02-17

## 2019-05-17 NOTE — PROGRESS NOTES
CC: Logan Mahoney is a 49 y.o. female is suffering from   Chief Complaint   Patient presents with   • Cough     x 5 days cough, scratchy throat, right ear pain         SUBJECTIVE:  1. 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    2. Morbid obesity (HCC)  Patient with a history of morbid obesity has done well over the past couple months losing approximately 4 pounds    3. IGT (impaired glucose tolerance)  Impaired glucose tolerance, patient is at significant risk for having problems with diabetes we will recheck labs in 3 months        Past social, family, history:   Social History   Substance Use Topics   • Smoking status: Never Smoker   • Smokeless tobacco: Never Used   • Alcohol use No         MEDICATIONS:    Current Outpatient Prescriptions:   •  amoxicillin-clavulanate (AUGMENTIN) 875-125 MG Tab, Take 1 Tab by mouth 2 times a day., Disp: 20 Tab, Rfl: 0  •  cetirizine (ZYRTEC) 10 MG Tab, Take 1 Tab by mouth every day., Disp: 30 Tab, Rfl: 0  •  fluticasone (FLONASE) 50 MCG/ACT nasal spray, Spray 2 Sprays in nose every day., Disp: 16 g, Rfl: 0  •  magnesium oxide (MAG-OX) 400 MG Tab, Take 400 mg by mouth every day., Disp: , Rfl:   •  calcium carbonate (OS-JUVENAL 500) 500 MG Tab, Take 1,000 mg by mouth 2 times a day, with meals., Disp: , Rfl:   •  Cyanocobalamin (VITAMIN B-12) 3000 MCG SL Tab, Place  under tongue., Disp: , Rfl:   •  vitamin D (CHOLECALCIFEROL) 1000 UNIT Tab, Take 1,000 Units by mouth every day., Disp: , Rfl:   •  Omega 3-6-9 Fatty Acids (TRIPLE OMEGA-3-6-9) Cap, Take  by mouth., Disp: , Rfl:   •  PARoxetine (PAXIL) 20 MG Tab, Take 1 Tab by mouth every evening., Disp: 90 Tab, Rfl: 3  •  carvedilol (COREG) 6.25  MG Tab, TAKE 1 TABLET BY MOUTH TWICE DAILY WITH MEALS, Disp: 180 Tab, Rfl: 3  •  multivitamin (THERAGRAN) Tab, Take 1 Tab by mouth every day., Disp: , Rfl:   •  nystatin-triamcinalone (MYCOLOG) 603685-0.1 UNIT/GM-% Ointment, Apply 1 Units to affected area(s) 2 times a day., Disp: 1 Tube, Rfl: 0  •  albuterol 108 (90 Base) MCG/ACT Aero Soln inhalation aerosol, Inhale 2 Puffs by mouth every 6 hours as needed for Shortness of Breath., Disp: 8.5 g, Rfl: 0  •  NON SPECIFIED, Nocturnal desaturation study., Disp: 1 Each, Rfl: 0  •  promethazine-dextromethorphan (PROMETHAZINE-DM) 6.25-15 MG/5ML syrup, Take 5 mL by mouth 4 times a day as needed for Cough., Disp: 140 mL, Rfl: 0  •  vitamin D, Ergocalciferol, (DRISDOL) 55512 UNIT CAPS capsule, Take 1,000 Units by mouth every evening., Disp: , Rfl:     PROBLEMS:  Patient Active Problem List    Diagnosis Date Noted   • Preop cardiovascular exam 07/20/2012     Priority: High   • Nonspecific abnormal electrocardiogram (ECG) (EKG) 07/20/2012     Priority: High   • Benign hypertension 07/20/2012     Priority: Medium   • IGT (impaired glucose tolerance) 05/17/2019   • Vitamin D deficiency disease 12/02/2016   • Morbid obesity (HCC) 05/31/2016   • S/P gastric surgery 04/18/2016       REVIEW OF SYSTEMS:  Gen.:  No Nausea, Vomiting, fever, Chills.  Heart: No chest pain.  Lungs:  No shortness of Breath.  Psychological: Rinku unusual Anxiety depression     PHYSICAL EXAM   Constitutional: Alert, cooperative, not in acute distress.  Cardiovascular:  Rate Rhythm is regular without murmurs rubs clicks.     Thorax & Lungs: Clear to auscultation, no wheezing, rhonchi, or rales  HENT: Normocephalic, Atraumatic.  Eyes: PERRLA, EOMI, Conjunctiva normal.   Neck: Trachia is midline no swelling of the thyroid.   Neurologic: Alert & oriented x 3, cranial nerves II through XII are intact, Normal motor function, Normal sensory function, No focal deficits noted.   Psychiatric: Affect normal, Judgment  "normal, Mood normal.     VITAL SIGNS:/72   Pulse 68   Temp 36.6 °C (97.9 °F) (Temporal)   Resp 12   Ht 1.638 m (5' 4.5\")   Wt (!) 137.4 kg (303 lb)   LMP 03/01/2016   SpO2 92%   BMI 51.21 kg/m²     Labs: Reviewed    Assessment:                                                     Plan:    1. Upper respiratory tract infection, unspecified type  Upper respiratory tract infection patient start Augmentin  - amoxicillin-clavulanate (AUGMENTIN) 875-125 MG Tab; Take 1 Tab by mouth 2 times a day.  Dispense: 20 Tab; Refill: 0    2. Morbid obesity (HCC)  Morbid obesity without significant improvement patient's recheck CBC also free T4 TSH  - CBC WITH DIFFERENTIAL; Future  - TSH; Future  - FREE THYROXINE; Future    3. IGT (impaired glucose tolerance)  Impaired glucose tolerance recheck basic metabolic panel fasting  - CBC WITH DIFFERENTIAL; Future  - Basic Metabolic Panel; Future        "

## 2019-05-20 ENCOUNTER — TELEPHONE (OUTPATIENT)
Dept: MEDICAL GROUP | Facility: LAB | Age: 49
End: 2019-05-20

## 2019-05-20 DIAGNOSIS — E11.8 CONTROLLED TYPE 2 DIABETES MELLITUS WITH COMPLICATION, WITHOUT LONG-TERM CURRENT USE OF INSULIN (HCC): ICD-10-CM

## 2019-05-20 NOTE — TELEPHONE ENCOUNTER
Telephone call returned to patient's cell phone message left on her cell phone that she is in fact diabetic I would like her to follow-up in our office within the next couple weeks so we can get her set up for a glucometer, additional testing to verify it in fact she is diabetic.      Marium:  Please call the patient cell phone, let her know that I have ordered a hemoglobin A1c test to be done this will help confirm but I suspect which is that she is in fact diabetic.  Also let her know I have sent met metformin, Glucophage to her pharmacy for her to start!  Please set her up to be seen in the office in the next 2 weeks if possible!  Regards, Manuel Barbosa, DO

## 2019-06-10 ENCOUNTER — HOSPITAL ENCOUNTER (OUTPATIENT)
Dept: LAB | Facility: MEDICAL CENTER | Age: 49
End: 2019-06-10
Attending: INTERNAL MEDICINE
Payer: COMMERCIAL

## 2019-06-10 DIAGNOSIS — R73.02 IGT (IMPAIRED GLUCOSE TOLERANCE): ICD-10-CM

## 2019-06-10 DIAGNOSIS — E66.01 MORBID OBESITY (HCC): ICD-10-CM

## 2019-06-10 DIAGNOSIS — E11.8 CONTROLLED TYPE 2 DIABETES MELLITUS WITH COMPLICATION, WITHOUT LONG-TERM CURRENT USE OF INSULIN (HCC): ICD-10-CM

## 2019-06-10 LAB
ANION GAP SERPL CALC-SCNC: 10 MMOL/L (ref 0–11.9)
BASOPHILS # BLD AUTO: 0.8 % (ref 0–1.8)
BASOPHILS # BLD: 0.07 K/UL (ref 0–0.12)
BUN SERPL-MCNC: 12 MG/DL (ref 8–22)
CALCIUM SERPL-MCNC: 9.2 MG/DL (ref 8.5–10.5)
CHLORIDE SERPL-SCNC: 103 MMOL/L (ref 96–112)
CO2 SERPL-SCNC: 26 MMOL/L (ref 20–33)
CREAT SERPL-MCNC: 0.73 MG/DL (ref 0.5–1.4)
EOSINOPHIL # BLD AUTO: 0.25 K/UL (ref 0–0.51)
EOSINOPHIL NFR BLD: 2.8 % (ref 0–6.9)
ERYTHROCYTE [DISTWIDTH] IN BLOOD BY AUTOMATED COUNT: 43.5 FL (ref 35.9–50)
EST. AVERAGE GLUCOSE BLD GHB EST-MCNC: 255 MG/DL
FASTING STATUS PATIENT QL REPORTED: NORMAL
GLUCOSE SERPL-MCNC: 240 MG/DL (ref 65–99)
HBA1C MFR BLD: 10.5 % (ref 0–5.6)
HCT VFR BLD AUTO: 47.2 % (ref 37–47)
HGB BLD-MCNC: 15.1 G/DL (ref 12–16)
IMM GRANULOCYTES # BLD AUTO: 0.02 K/UL (ref 0–0.11)
IMM GRANULOCYTES NFR BLD AUTO: 0.2 % (ref 0–0.9)
LYMPHOCYTES # BLD AUTO: 2.37 K/UL (ref 1–4.8)
LYMPHOCYTES NFR BLD: 26.2 % (ref 22–41)
MCH RBC QN AUTO: 29.4 PG (ref 27–33)
MCHC RBC AUTO-ENTMCNC: 32 G/DL (ref 33.6–35)
MCV RBC AUTO: 92 FL (ref 81.4–97.8)
MONOCYTES # BLD AUTO: 0.67 K/UL (ref 0–0.85)
MONOCYTES NFR BLD AUTO: 7.4 % (ref 0–13.4)
NEUTROPHILS # BLD AUTO: 5.68 K/UL (ref 2–7.15)
NEUTROPHILS NFR BLD: 62.6 % (ref 44–72)
NRBC # BLD AUTO: 0 K/UL
NRBC BLD-RTO: 0 /100 WBC
PLATELET # BLD AUTO: 276 K/UL (ref 164–446)
PMV BLD AUTO: 10.8 FL (ref 9–12.9)
POTASSIUM SERPL-SCNC: 4.4 MMOL/L (ref 3.6–5.5)
RBC # BLD AUTO: 5.13 M/UL (ref 4.2–5.4)
SODIUM SERPL-SCNC: 139 MMOL/L (ref 135–145)
T4 FREE SERPL-MCNC: 0.68 NG/DL (ref 0.53–1.43)
TSH SERPL DL<=0.005 MIU/L-ACNC: 2.55 UIU/ML (ref 0.38–5.33)
WBC # BLD AUTO: 9.1 K/UL (ref 4.8–10.8)

## 2019-06-10 PROCEDURE — 84443 ASSAY THYROID STIM HORMONE: CPT

## 2019-06-10 PROCEDURE — 85025 COMPLETE CBC W/AUTO DIFF WBC: CPT

## 2019-06-10 PROCEDURE — 84439 ASSAY OF FREE THYROXINE: CPT

## 2019-06-10 PROCEDURE — 83036 HEMOGLOBIN GLYCOSYLATED A1C: CPT

## 2019-06-10 PROCEDURE — 80048 BASIC METABOLIC PNL TOTAL CA: CPT

## 2019-06-10 PROCEDURE — 36415 COLL VENOUS BLD VENIPUNCTURE: CPT

## 2019-06-12 ENCOUNTER — OFFICE VISIT (OUTPATIENT)
Dept: MEDICAL GROUP | Facility: LAB | Age: 49
End: 2019-06-12
Payer: COMMERCIAL

## 2019-06-12 ENCOUNTER — APPOINTMENT (RX ONLY)
Dept: URBAN - METROPOLITAN AREA CLINIC 36 | Facility: CLINIC | Age: 49
Setting detail: DERMATOLOGY
End: 2019-06-12

## 2019-06-12 VITALS
TEMPERATURE: 98.8 F | WEIGHT: 293 LBS | HEART RATE: 68 BPM | SYSTOLIC BLOOD PRESSURE: 138 MMHG | HEIGHT: 65 IN | OXYGEN SATURATION: 94 % | BODY MASS INDEX: 48.82 KG/M2 | DIASTOLIC BLOOD PRESSURE: 84 MMHG | RESPIRATION RATE: 12 BRPM

## 2019-06-12 DIAGNOSIS — Z48.817 ENCOUNTER FOR SURGICAL AFTERCARE FOLLOWING SURGERY ON THE SKIN AND SUBCUTANEOUS TISSUE: ICD-10-CM

## 2019-06-12 DIAGNOSIS — E66.01 MORBID OBESITY (HCC): ICD-10-CM

## 2019-06-12 DIAGNOSIS — L73.8 OTHER SPECIFIED FOLLICULAR DISORDERS: ICD-10-CM

## 2019-06-12 DIAGNOSIS — Z56.6 WORK-RELATED STRESS: ICD-10-CM

## 2019-06-12 DIAGNOSIS — E11.9 CONTROLLED TYPE 2 DIABETES MELLITUS WITHOUT COMPLICATION, WITHOUT LONG-TERM CURRENT USE OF INSULIN (HCC): ICD-10-CM

## 2019-06-12 LAB — GLUCOSE BLD-MCNC: 244 MG/DL (ref 70–100)

## 2019-06-12 PROCEDURE — 99024 POSTOP FOLLOW-UP VISIT: CPT

## 2019-06-12 PROCEDURE — 82962 GLUCOSE BLOOD TEST: CPT | Performed by: INTERNAL MEDICINE

## 2019-06-12 PROCEDURE — ? COUNSELING

## 2019-06-12 PROCEDURE — ? POST-OP WOUND CHECK

## 2019-06-12 PROCEDURE — ? INTRALESIONAL KENALOG

## 2019-06-12 PROCEDURE — ? FRAXEL

## 2019-06-12 PROCEDURE — 99214 OFFICE O/P EST MOD 30 MIN: CPT | Performed by: INTERNAL MEDICINE

## 2019-06-12 PROCEDURE — ? COSMETIC CONSULTATION - MICRO-NEEDLING

## 2019-06-12 PROCEDURE — 11900 INJECT SKIN LESIONS </W 7: CPT

## 2019-06-12 SDOH — HEALTH STABILITY - MENTAL HEALTH: OTHER PHYSICAL AND MENTAL STRAIN RELATED TO WORK: Z56.6

## 2019-06-12 ASSESSMENT — LOCATION SIMPLE DESCRIPTION DERM
LOCATION SIMPLE: LEFT FOREHEAD
LOCATION SIMPLE: RIGHT CHEEK

## 2019-06-12 ASSESSMENT — LOCATION DETAILED DESCRIPTION DERM
LOCATION DETAILED: RIGHT SUPERIOR MEDIAL MALAR CHEEK
LOCATION DETAILED: RIGHT SUPERIOR NASAL CHEEK
LOCATION DETAILED: LEFT FOREHEAD

## 2019-06-12 ASSESSMENT — LOCATION ZONE DERM: LOCATION ZONE: FACE

## 2019-06-12 NOTE — PROCEDURE: POST-OP WOUND CHECK
Wound Evaluated By: Lenka Gray MD
Detail Level: Simple
Add 38679 Cpt? (Important Note: In 2017 The Use Of 99514 Is Being Tracked By Cms To Determine Future Global Period Reimbursement For Global Periods): yes

## 2019-06-12 NOTE — LETTER
June 12, 2019         Logan Mahoney  [address]  [facility] [address]        Dear Logan:      Below are the results from your recent visit:    Resulted Orders   POCT Glucose   Result Value Ref Range    Glucose - Accu-Ck 244 (A) 70 - 100 mg/dL     The test results show continued elevated blood sugars.     If you have any questions or concerns, please don't hesitate to call.        Sincerely,      Manuel Barbosa D.O.    Electronically Signed

## 2019-06-12 NOTE — PROGRESS NOTES
CC: Logan Mahoney is a 49 y.o. female is suffering from   Chief Complaint   Patient presents with   • Results     discuss OPO and labs results   • URI     pt still having wheeze         SUBJECTIVE:  1. 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    2. Controlled type 2 diabetes mellitus without complication, without long-term current use of insulin (HCC)  History of type 2 diabetes, is responding to the use of metformin, the exact amount of correction is on known.  I have agreed to delay adding Jardiance to her medical regimen until we can see if she is responding well to the use of metformin.  Patient's to undergo diabetic education    3. Work-related stress  Patient with work-related stress associate with personal complaints filed against her, but these were later found to be unjustified.        Past social, family, history:   Social History   Substance Use Topics   • Smoking status: Never Smoker   • Smokeless tobacco: Never Used   • Alcohol use No         MEDICATIONS:    Current Outpatient Prescriptions:   •  NON SPECIFIED, Glucometer and Test Strips and related supplies as covered by insurance.  QS for 90 days check qd and prn, Disp: 150 Each, Rfl: 3  •  metFORMIN (GLUCOPHAGE) 500 MG Tab, Take 1 Tab by mouth 2 times a day, with meals., Disp: 180 Tab, Rfl: 3  •  magnesium oxide (MAG-OX) 400 MG Tab, Take 400 mg by mouth every day., Disp: , Rfl:   •  calcium carbonate (OS-JUVENAL 500) 500 MG Tab, Take 1,000 mg by mouth 2 times a day, with meals., Disp: , Rfl:   •  Cyanocobalamin (VITAMIN B-12) 3000 MCG SL Tab, Place  under tongue., Disp: , Rfl:   •  vitamin D (CHOLECALCIFEROL) 1000 UNIT Tab, Take 1,000 Units by mouth every day., Disp: , Rfl:   •  Omega 3-6-9 Fatty Acids (TRIPLE OMEGA-3-6-9) Cap, Take  by  mouth., Disp: , Rfl:   •  PARoxetine (PAXIL) 20 MG Tab, Take 1 Tab by mouth every evening., Disp: 90 Tab, Rfl: 3  •  carvedilol (COREG) 6.25 MG Tab, TAKE 1 TABLET BY MOUTH TWICE DAILY WITH MEALS, Disp: 180 Tab, Rfl: 3  •  multivitamin (THERAGRAN) Tab, Take 1 Tab by mouth every day., Disp: , Rfl:   •  amoxicillin-clavulanate (AUGMENTIN) 875-125 MG Tab, Take 1 Tab by mouth 2 times a day. (Patient not taking: Reported on 6/12/2019), Disp: 20 Tab, Rfl: 0  •  nystatin-triamcinalone (MYCOLOG) 113792-9.1 UNIT/GM-% Ointment, Apply 1 Units to affected area(s) 2 times a day. (Patient not taking: Reported on 6/12/2019), Disp: 1 Tube, Rfl: 0  •  cetirizine (ZYRTEC) 10 MG Tab, Take 1 Tab by mouth every day. (Patient not taking: Reported on 6/12/2019), Disp: 30 Tab, Rfl: 0  •  fluticasone (FLONASE) 50 MCG/ACT nasal spray, Spray 2 Sprays in nose every day. (Patient not taking: Reported on 6/12/2019), Disp: 16 g, Rfl: 0  •  albuterol 108 (90 Base) MCG/ACT Aero Soln inhalation aerosol, Inhale 2 Puffs by mouth every 6 hours as needed for Shortness of Breath. (Patient not taking: Reported on 6/12/2019), Disp: 8.5 g, Rfl: 0  •  NON SPECIFIED, Nocturnal desaturation study. (Patient not taking: Reported on 6/12/2019), Disp: 1 Each, Rfl: 0  •  promethazine-dextromethorphan (PROMETHAZINE-DM) 6.25-15 MG/5ML syrup, Take 5 mL by mouth 4 times a day as needed for Cough. (Patient not taking: Reported on 6/12/2019), Disp: 140 mL, Rfl: 0  •  vitamin D, Ergocalciferol, (DRISDOL) 25492 UNIT CAPS capsule, Take 1,000 Units by mouth every evening., Disp: , Rfl:     PROBLEMS:  Patient Active Problem List    Diagnosis Date Noted   • Preop cardiovascular exam 07/20/2012     Priority: High   • Nonspecific abnormal electrocardiogram (ECG) (EKG) 07/20/2012     Priority: High   • Benign hypertension 07/20/2012     Priority: Medium   • DM II (diabetes mellitus, type II), controlled ([facility]) 06/12/2019   • IGT (impaired glucose tolerance) 05/17/2019   • Vitamin D  "deficiency disease 12/02/2016   • Morbid obesity (HCC) 05/31/2016   • S/P gastric surgery 04/18/2016       REVIEW OF SYSTEMS:  Gen.:  No Nausea, Vomiting, fever, Chills.  Heart: No chest pain.  Lungs:  No shortness of Breath.  Psychological: Rinku unusual Anxiety depression     PHYSICAL EXAM   Constitutional: Alert, cooperative, not in acute distress.  Cardiovascular:  Rate Rhythm is regular without murmurs rubs clicks.     Thorax & Lungs: Clear to auscultation, no wheezing, rhonchi, or rales  HENT: Normocephalic, Atraumatic.  Eyes: PERRLA, EOMI, Conjunctiva normal.   Neck: Trachia is midline no swelling of the thyroid.   Lymphatic: No lymphadenopathy noted.   Neurologic: Alert & oriented x 3, cranial nerves II through XII are intact, Normal motor function, Normal sensory function, No focal deficits noted.   Psychiatric: Affect normal, Judgment normal, Mood normal.     VITAL SIGNS:/84   Pulse 68   Temp 37.1 °C (98.8 °F) (Temporal)   Resp 12   Ht 1.638 m (5' 4.5\")   Wt (!) 137.9 kg (304 lb)   LMP 03/01/2016   SpO2 94%   BMI 51.38 kg/m²     Labs: Reviewed    Assessment:                                                     Plan:    1. Morbid obesity (HCC)  Patient with severe morbid obesity, has previously undergone gastric bypass.  Patient will need additional encouragement for weight loss.  Consider possible referral to physician supervised weight loss program  - REFERRAL TO DIABETIC EDUCATION Diabetes Self Management Education / Training (DSME/T) and Medical Nutrition Therapy (MNT): Initial Group DSME/MNT as authorized by payor, Follow-Up DSME/MNT as authorized by payor; DSME/T Content: Monitoring Diabete...    2. Controlled type 2 diabetes mellitus without complication, without long-term current use of insulin (HCC)  Type 2 diabetes patient was given instruction today in the use of glucometer, patient's to be sent to diabetic education  - NON SPECIFIED; Glucometer and Test Strips and related supplies " as covered by insurance.  QS for 90 days check qd and prn  Dispense: 150 Each; Refill: 3  - POCT Glucose  - REFERRAL TO DIABETIC EDUCATION Diabetes Self Management Education / Training (DSME/T) and Medical Nutrition Therapy (MNT): Initial Group DSME/MNT as authorized by payor, Follow-Up DSME/MNT as authorized by payor; DSME/T Content: Monitoring Diabete...    3. Work-related stress  Work-related stress ongoing.

## 2019-06-12 NOTE — PROCEDURE: INTRALESIONAL KENALOG
Concentration Of Kenalog Solution Injected (Mg/Ml): 10.0
Medical Necessity Clause: This procedure was medically necessary because the lesions that were treated were:
Treatment Number (Optional): 2
Administered By (Optional): Lenka Gray M.D.
X Size Of Lesion In Cm (Optional): 0
Kenalog Preparation: Kenalog
Detail Level: Detailed
Include Z78.9 (Other Specified Conditions Influencing Health Status) As An Associated Diagnosis?: No
Total Volume (Ccs): 0.2
Consent: The risks of atrophy were reviewed with the patient.

## 2019-06-12 NOTE — PROCEDURE: FRAXEL
Detail Level: Zone
Treatment Level: 4
Location: full face except eyelids
Energy(Mj/Cm2): 20
Treatment Level: 5
Location: neck
Energy(Mj/Cm2): 30
Post-Care Instructions: I reviewed with the patient in detail post-care instructions.  Apply vaseline to any areas of crusting, expect mild erythema and edema for 48 hours. Patient should avoid sun until area fully healed. Recommend physical blocker sunblock, mild cleanser. Do not pick at areas treated.
Total Coverage: 30%
Treatment Level: 9
Energy(Mj/Cm2): 25
Total Coverage: 11%
External Cooling: [employer] 6
Total Coverage: 35%
Location: decollete of the chest
Number Of Passes: 6
Indication: surgical scars
Total Coverage: 15%
Treatment Level: 1
Treatment Level: 7
Total Coverage: 45%
Location: nose
Consent: Written consent obtained, risks reviewed including but not limited to pain and incomplete improvement.
Add Post-Care Below To The Note: Yes
Energy(Mj/Cm2): 70
Wavelength: 1550nm

## 2019-06-12 NOTE — HPI: WOUND CHECK (POST-OP)
Additional History: Two Fraxel and one vbeam laser treatment has been performed. Last visit ILK was used to treat thickened scar tissue. Today there is a spitting suture present, she tried popping and draining this at home but it filled back up.
Date Of Procedure: 3/4/2019

## 2019-06-13 RX ORDER — PAROXETINE HYDROCHLORIDE 20 MG/1
TABLET, FILM COATED ORAL
Qty: 90 TAB | Refills: 4 | Status: SHIPPED | OUTPATIENT
Start: 2019-06-13 | End: 2020-06-15

## 2019-06-13 NOTE — TELEPHONE ENCOUNTER
Was the patient seen in the last year in this department? Yes 6-    Does patient have an active prescription for medications requested? No     Received Request Via: Pharmacy

## 2019-06-15 ENCOUNTER — RX ONLY (OUTPATIENT)
Age: 49
Setting detail: RX ONLY
End: 2019-06-15

## 2019-06-15 RX ORDER — ACYCLOVIR 400 MG/1
TABLET ORAL
Qty: 21 | Refills: 1 | Status: ERX

## 2019-06-21 ENCOUNTER — RX ONLY (OUTPATIENT)
Age: 49
Setting detail: RX ONLY
End: 2019-06-21

## 2019-06-21 RX ORDER — MINOCYCLINE HYDROCHLORIDE 100 MG/1
CAPSULE ORAL
Qty: 60 | Refills: 1 | Status: ERX

## 2019-07-12 ENCOUNTER — TELEPHONE (OUTPATIENT)
Dept: MEDICAL GROUP | Facility: LAB | Age: 49
End: 2019-07-12

## 2019-07-12 NOTE — TELEPHONE ENCOUNTER
Marium:  She does not need any additional labs, will review the one she did in June!  Regards, Manuel Barbosa, DO

## 2019-07-12 NOTE — TELEPHONE ENCOUNTER
Pt has an appt on 7/18/19 and was asking if you wanted her to do a lab for her A1C or do this at her appt in the office.

## 2019-07-18 ENCOUNTER — OFFICE VISIT (OUTPATIENT)
Dept: MEDICAL GROUP | Facility: LAB | Age: 49
End: 2019-07-18
Payer: COMMERCIAL

## 2019-07-18 VITALS
HEART RATE: 78 BPM | RESPIRATION RATE: 12 BRPM | SYSTOLIC BLOOD PRESSURE: 135 MMHG | DIASTOLIC BLOOD PRESSURE: 72 MMHG | HEIGHT: 65 IN | BODY MASS INDEX: 48.82 KG/M2 | TEMPERATURE: 97.3 F | WEIGHT: 293 LBS | OXYGEN SATURATION: 97 %

## 2019-07-18 DIAGNOSIS — E11.9 CONTROLLED TYPE 2 DIABETES MELLITUS WITHOUT COMPLICATION, WITHOUT LONG-TERM CURRENT USE OF INSULIN (HCC): ICD-10-CM

## 2019-07-18 DIAGNOSIS — E66.01 MORBID OBESITY (HCC): ICD-10-CM

## 2019-07-18 PROBLEM — R73.02 IGT (IMPAIRED GLUCOSE TOLERANCE): Status: RESOLVED | Noted: 2019-05-17 | Resolved: 2019-07-18

## 2019-07-18 PROCEDURE — 99213 OFFICE O/P EST LOW 20 MIN: CPT | Performed by: INTERNAL MEDICINE

## 2019-07-18 RX ORDER — MINOCYCLINE HYDROCHLORIDE 100 MG/1
CAPSULE ORAL
Refills: 1 | COMMUNITY
Start: 2019-06-21 | End: 2020-02-17

## 2019-07-18 RX ORDER — BLOOD-GLUCOSE METER
EACH MISCELLANEOUS
Refills: 0 | COMMUNITY
Start: 2019-06-13

## 2019-07-18 RX ORDER — ACYCLOVIR 400 MG/1
TABLET ORAL
Refills: 1 | COMMUNITY
Start: 2019-06-24 | End: 2021-10-25

## 2019-07-18 RX ORDER — LANCETS 33 GAUGE
EACH MISCELLANEOUS
Refills: 3 | COMMUNITY
Start: 2019-06-13

## 2019-07-18 RX ORDER — EMPAGLIFLOZIN 10 MG/1
TABLET, FILM COATED ORAL
Refills: 3 | COMMUNITY
Start: 2019-07-10 | End: 2020-06-16

## 2019-07-18 NOTE — PROGRESS NOTES
CC: Logan Mahoney is a 49 y.o. female is suffering from   Chief Complaint   Patient presents with   • Diabetes     followup   • Shoulder Pain     right should x 1 week         SUBJECTIVE:  1. Morbid obesity (HCC)  Pamela is here for follow-up continues to have issues with her weight, reviewed her records she has been losing weight very gradually states that she is eating approximately 10 fruits and vegetables each day    2. Controlled type 2 diabetes mellitus without complication, without long-term current use of insulin (HCC)  Patient with poorly controlled type 2 diabetes, reviewed her most recent records which show dramatic improvement.        Past social, family, history: , schoolteacher  Social History   Substance Use Topics   • Smoking status: Never Smoker   • Smokeless tobacco: Never Used   • Alcohol use No         MEDICATIONS:    Current Outpatient Prescriptions:   •  minocycline (MINOCIN) 100 MG Cap, TK 1 C PO BID WITH FOOD AND A FULL GLASS OF WATER, Disp: , Rfl: 1  •  Blood Glucose Monitoring Suppl (ONE TOUCH ULTRA 2) w/Device Kit, USE FOR BLOOD SUGAR TEST UTD, Disp: , Rfl: 0  •  JARDIANCE 10 MG Tab, TK 1 T PO QD, Disp: , Rfl: 3  •  ONE TOUCH ULTRA TEST strip, USE ONE STRIP FOR BLOOD SUGAR TEST D AND PRN, Disp: , Rfl: 3  •  ONETOUCH DELICA LANCETS 33G Misc, USE ONE LANCET FOR BLOOD SUGAR TEST D AND PRN, Disp: , Rfl: 3  •  NON SPECIFIED, Glucometer and Test Strips and related supplies as covered by insurance.  QS for 90 days check qd and prn, Disp: 150 Each, Rfl: 3  •  metFORMIN (GLUCOPHAGE) 500 MG Tab, Take 1 Tab by mouth 2 times a day, with meals., Disp: 180 Tab, Rfl: 3  •  magnesium oxide (MAG-OX) 400 MG Tab, Take 400 mg by mouth every day., Disp: , Rfl:   •  calcium carbonate (OS-JUVENAL 500) 500 MG Tab, Take 1,000 mg by mouth 2 times a day, with meals., Disp: , Rfl:   •  Cyanocobalamin (VITAMIN B-12) 3000 MCG SL Tab, Place  under tongue., Disp: , Rfl:   •  Omega 3-6-9 Fatty Acids (TRIPLE  OMEGA-3-6-9) Cap, Take  by mouth., Disp: , Rfl:   •  PARoxetine (PAXIL) 20 MG Tab, Take 1 Tab by mouth every evening., Disp: 90 Tab, Rfl: 3  •  carvedilol (COREG) 6.25 MG Tab, TAKE 1 TABLET BY MOUTH TWICE DAILY WITH MEALS, Disp: 180 Tab, Rfl: 3  •  NON SPECIFIED, Nocturnal desaturation study., Disp: 1 Each, Rfl: 0  •  multivitamin (THERAGRAN) Tab, Take 1 Tab by mouth every day., Disp: , Rfl:   •  vitamin D, Ergocalciferol, (DRISDOL) 81573 UNIT CAPS capsule, Take 1,000 Units by mouth every evening., Disp: , Rfl:   •  ALBUTEROL SULFATE HFA INH, INHALE 2 PUFFS PO Q 6 H PRN FOR SOB, Disp: , Rfl: 0  •  acyclovir (ZOVIRAX) 400 MG tablet, TK ONE T PO TID WF AND FULL GLASS OF WATER FOR 7 DAYS, Disp: , Rfl: 1  •  PARoxetine (PAXIL) 20 MG Tab, TAKE 1 TABLET BY MOUTH EVERY DAY, Disp: 90 Tab, Rfl: 4  •  amoxicillin-clavulanate (AUGMENTIN) 875-125 MG Tab, Take 1 Tab by mouth 2 times a day. (Patient not taking: Reported on 6/12/2019), Disp: 20 Tab, Rfl: 0  •  nystatin-triamcinalone (MYCOLOG) 601600-9.1 UNIT/GM-% Ointment, Apply 1 Units to affected area(s) 2 times a day. (Patient not taking: Reported on 6/12/2019), Disp: 1 Tube, Rfl: 0  •  cetirizine (ZYRTEC) 10 MG Tab, Take 1 Tab by mouth every day. (Patient not taking: Reported on 6/12/2019), Disp: 30 Tab, Rfl: 0  •  fluticasone (FLONASE) 50 MCG/ACT nasal spray, Spray 2 Sprays in nose every day. (Patient not taking: Reported on 6/12/2019), Disp: 16 g, Rfl: 0  •  albuterol 108 (90 Base) MCG/ACT Aero Soln inhalation aerosol, Inhale 2 Puffs by mouth every 6 hours as needed for Shortness of Breath. (Patient not taking: Reported on 6/12/2019), Disp: 8.5 g, Rfl: 0  •  vitamin D (CHOLECALCIFEROL) 1000 UNIT Tab, Take 1,000 Units by mouth every day., Disp: , Rfl:   •  promethazine-dextromethorphan (PROMETHAZINE-DM) 6.25-15 MG/5ML syrup, Take 5 mL by mouth 4 times a day as needed for Cough. (Patient not taking: Reported on 6/12/2019), Disp: 140 mL, Rfl: 0    PROBLEMS:  Patient Active  "Problem List    Diagnosis Date Noted   • Preop cardiovascular exam 07/20/2012     Priority: High   • Nonspecific abnormal electrocardiogram (ECG) (EKG) 07/20/2012     Priority: High   • Benign hypertension 07/20/2012     Priority: Medium   • DM II (diabetes mellitus, type II), controlled ([facility]) 06/12/2019   • Vitamin D deficiency disease 12/02/2016   • Morbid obesity ([facility]) 05/31/2016   • S/P gastric surgery 04/18/2016       REVIEW OF SYSTEMS:  Gen.:  No Nausea, Vomiting, fever, Chills.  Heart: No chest pain.  Lungs:  No shortness of Breath.  Psychological: Rinku unusual Anxiety depression     PHYSICAL EXAM   Constitutional: Alert, cooperative, not in acute distress.  Cardiovascular:  Rate Rhythm is regular without murmurs rubs clicks.     Thorax & Lungs: Clear to auscultation, no wheezing, rhonchi, or rales  HENT: Normocephalic, Atraumatic.  Eyes: PERRLA, EOMI, Conjunctiva normal.   Neck: Trachia is midline no swelling of the thyroid.   Lymphatic: No lymphadenopathy noted.   Neurologic: Alert & oriented x 3, cranial nerves II through XII are intact, Normal motor function, Normal sensory function, No focal deficits noted.   Psychiatric: Affect normal, Judgment normal, Mood normal.     VITAL SIGNS:/88 (BP Location: Left arm, Patient Position: Sitting, BP Cuff Size: Large adult)   Pulse 78   Temp 36.3 °C (97.3 °F) (Temporal)   Resp 12   Ht 1.638 m (5' 4.5\")   Wt (!) 136.1 kg (300 lb)   LMP 03/01/2016   SpO2 97%   BMI 50.70 kg/m²  Recheck  /72    Labs: Reviewed    Assessment:                                                     Plan:    1. Morbid obesity (HCC)  Continue to work on diet exercise previous gastric bypass is not going through any additional surgeries    2. Controlled type 2 diabetes mellitus without complication, without long-term current use of insulin ([facility])  Recheck hemoglobin A1c  - HEMOGLOBIN A1C; Future        "

## 2019-07-26 ENCOUNTER — APPOINTMENT (RX ONLY)
Dept: URBAN - METROPOLITAN AREA CLINIC 36 | Facility: CLINIC | Age: 49
Setting detail: DERMATOLOGY
End: 2019-07-26

## 2019-07-26 DIAGNOSIS — L73.8 OTHER SPECIFIED FOLLICULAR DISORDERS: ICD-10-CM

## 2019-07-26 DIAGNOSIS — Z41.9 ENCOUNTER FOR PROCEDURE FOR PURPOSES OTHER THAN REMEDYING HEALTH STATE, UNSPECIFIED: ICD-10-CM

## 2019-07-26 PROCEDURE — ? BENIGN DESTRUCTION COSMETIC MULTI

## 2019-07-26 PROCEDURE — ? MICRONEEDLING

## 2019-07-26 ASSESSMENT — LOCATION DETAILED DESCRIPTION DERM
LOCATION DETAILED: RIGHT INFERIOR MEDIAL FOREHEAD
LOCATION DETAILED: RIGHT SUPERIOR FOREHEAD
LOCATION DETAILED: LEFT MEDIAL FOREHEAD
LOCATION DETAILED: LEFT MEDIAL MALAR CHEEK
LOCATION DETAILED: RIGHT FOREHEAD
LOCATION DETAILED: LEFT INFERIOR MEDIAL MALAR CHEEK
LOCATION DETAILED: RIGHT LATERAL MALAR CHEEK
LOCATION DETAILED: LEFT INFERIOR TEMPLE
LOCATION DETAILED: LEFT INFERIOR MEDIAL FOREHEAD
LOCATION DETAILED: LEFT CENTRAL MALAR CHEEK
LOCATION DETAILED: RIGHT INFERIOR CENTRAL MALAR CHEEK
LOCATION DETAILED: LEFT INFERIOR CENTRAL MALAR CHEEK
LOCATION DETAILED: LEFT INFERIOR FOREHEAD
LOCATION DETAILED: RIGHT INFERIOR LATERAL FOREHEAD
LOCATION DETAILED: RIGHT LATERAL FOREHEAD
LOCATION DETAILED: LEFT NASAL SIDEWALL
LOCATION DETAILED: LEFT INFERIOR LATERAL FOREHEAD
LOCATION DETAILED: LEFT LATERAL FOREHEAD
LOCATION DETAILED: RIGHT CENTRAL MALAR CHEEK
LOCATION DETAILED: RIGHT NASAL SIDEWALL
LOCATION DETAILED: RIGHT SUPERIOR CENTRAL MALAR CHEEK
LOCATION DETAILED: RIGHT SUPERIOR MEDIAL BUCCAL CHEEK

## 2019-07-26 ASSESSMENT — LOCATION SIMPLE DESCRIPTION DERM
LOCATION SIMPLE: RIGHT CHEEK
LOCATION SIMPLE: RIGHT NOSE
LOCATION SIMPLE: LEFT FOREHEAD
LOCATION SIMPLE: LEFT CHEEK
LOCATION SIMPLE: LEFT NOSE
LOCATION SIMPLE: RIGHT FOREHEAD
LOCATION SIMPLE: LEFT TEMPLE

## 2019-07-26 ASSESSMENT — LOCATION ZONE DERM
LOCATION ZONE: NOSE
LOCATION ZONE: FACE

## 2019-07-26 NOTE — PROCEDURE: BENIGN DESTRUCTION COSMETIC MULTI
Post-Care Instructions: I reviewed with the patient in detail post-care instructions. Patient is to wear sunprotection, and avoid picking at any of the treated lesions. Pt may apply Vaseline to crusted or scabbing areas.
Anesthesia Volume In Cc: 0.5
Consent: The patient's consent was obtained including but not limited to risks of crusting, scabbing, blistering, scarring, darker or lighter pigmentary change, recurrence, incomplete removal and infection.
Detail Level: Zone
Total Number Of Lesions Treated: 9

## 2019-07-26 NOTE — PROCEDURE: MICRONEEDLING
Location #1: cheeks
Depth In Mm (Location #3): 1
Length Of Topical Anesthesia Application (Optional): 60 minutes
Post-Care Instructions: After the procedure, take precautions agains sun exposure. Do not apply sunscreen for 12 hours after the procedure. Do not apply make-up for 12 hours after the procedure. Avoid alcohol based toners for 10-14 days. After 2-3 days patients can return to their regular skin regimen.
Location #4: under eyes and upper lip
Depth In Mm (Location #2): 1.5
Depth In Mm (Location #4): 0.5
Consent: Written consent obtained, risks reviewed including but not limited to pain, scarring, infection and incomplete improvement.  Patient understands the procedure is cosmetic in nature and will require out of pocket payment.
Location #3: forehead
Depth In Mm (Location #1): 2
Topical Anesthesia?: 23% lidocaine, 7% tetracaine
Depth In Mm (Location #5): 0.1
Detail Level: Zone
Price (Use Numbers Only, No Special Characters Or $): 350.00
Location #2: chin

## 2019-11-12 ENCOUNTER — HOSPITAL ENCOUNTER (OUTPATIENT)
Dept: LAB | Facility: MEDICAL CENTER | Age: 49
End: 2019-11-12
Attending: INTERNAL MEDICINE
Payer: COMMERCIAL

## 2019-11-12 DIAGNOSIS — E11.9 CONTROLLED TYPE 2 DIABETES MELLITUS WITHOUT COMPLICATION, WITHOUT LONG-TERM CURRENT USE OF INSULIN (HCC): ICD-10-CM

## 2019-11-12 LAB
EST. AVERAGE GLUCOSE BLD GHB EST-MCNC: 160 MG/DL
HBA1C MFR BLD: 7.2 % (ref 0–5.6)

## 2019-11-12 PROCEDURE — 36415 COLL VENOUS BLD VENIPUNCTURE: CPT

## 2019-11-12 PROCEDURE — 83036 HEMOGLOBIN GLYCOSYLATED A1C: CPT

## 2019-11-15 ENCOUNTER — OFFICE VISIT (OUTPATIENT)
Dept: MEDICAL GROUP | Facility: LAB | Age: 49
End: 2019-11-15
Payer: COMMERCIAL

## 2019-11-15 VITALS
HEART RATE: 62 BPM | RESPIRATION RATE: 13 BRPM | HEIGHT: 65 IN | SYSTOLIC BLOOD PRESSURE: 125 MMHG | OXYGEN SATURATION: 93 % | BODY MASS INDEX: 48.32 KG/M2 | TEMPERATURE: 98.2 F | DIASTOLIC BLOOD PRESSURE: 80 MMHG | WEIGHT: 290 LBS

## 2019-11-15 DIAGNOSIS — E11.9 CONTROLLED TYPE 2 DIABETES MELLITUS WITHOUT COMPLICATION, WITHOUT LONG-TERM CURRENT USE OF INSULIN (HCC): ICD-10-CM

## 2019-11-15 DIAGNOSIS — R10.12 LEFT UPPER QUADRANT PAIN: ICD-10-CM

## 2019-11-15 DIAGNOSIS — Z12.39 SCREENING FOR BREAST CANCER: ICD-10-CM

## 2019-11-15 PROCEDURE — 99214 OFFICE O/P EST MOD 30 MIN: CPT | Performed by: INTERNAL MEDICINE

## 2019-11-15 NOTE — PROGRESS NOTES
CC: Logan Mahoney is a 49 y.o. female is suffering from   Chief Complaint   Patient presents with   • Follow-Up     Blood sugar    • Other     Everyday lower sharp abdomial pain,          SUBJECTIVE:  1. Left upper quadrant pain  Shells here for follow-up is having problems with left upper quadrant pain discomfort.  States this occurs intermittently, is near to her previous lap band surgery site    2. Screening for breast cancer  Screening mammogram ordered    3. Controlled type 2 diabetes mellitus without complication, without long-term current use of insulin (HCC)  History of type 2 diabetes with good control        Past social, family, history:   Social History     Tobacco Use   • Smoking status: Never Smoker   • Smokeless tobacco: Never Used   Substance Use Topics   • Alcohol use: No   • Drug use: No         MEDICATIONS:    Current Outpatient Medications:   •  minocycline (MINOCIN) 100 MG Cap, TK 1 C PO BID WITH FOOD AND A FULL GLASS OF WATER, Disp: , Rfl: 1  •  Blood Glucose Monitoring Suppl (ONE TOUCH ULTRA 2) w/Device Kit, USE FOR BLOOD SUGAR TEST UTD, Disp: , Rfl: 0  •  JARDIANCE 10 MG Tab, TK 1 T PO QD, Disp: , Rfl: 3  •  ONE TOUCH ULTRA TEST strip, USE ONE STRIP FOR BLOOD SUGAR TEST D AND PRN, Disp: , Rfl: 3  •  ONETOUCH DELICA LANCETS 33G Misc, USE ONE LANCET FOR BLOOD SUGAR TEST D AND PRN, Disp: , Rfl: 3  •  ALBUTEROL SULFATE HFA INH, INHALE 2 PUFFS PO Q 6 H PRN FOR SOB, Disp: , Rfl: 0  •  acyclovir (ZOVIRAX) 400 MG tablet, TK ONE T PO TID WF AND FULL GLASS OF WATER FOR 7 DAYS, Disp: , Rfl: 1  •  PARoxetine (PAXIL) 20 MG Tab, TAKE 1 TABLET BY MOUTH EVERY DAY, Disp: 90 Tab, Rfl: 4  •  NON SPECIFIED, Glucometer and Test Strips and related supplies as covered by insurance.  QS for 90 days check qd and prn, Disp: 150 Each, Rfl: 3  •  metFORMIN (GLUCOPHAGE) 500 MG Tab, Take 1 Tab by mouth 2 times a day, with meals., Disp: 180 Tab, Rfl: 3  •  amoxicillin-clavulanate (AUGMENTIN) 875-125 MG Tab,  Take 1 Tab by mouth 2 times a day. (Patient not taking: Reported on 6/12/2019), Disp: 20 Tab, Rfl: 0  •  nystatin-triamcinalone (MYCOLOG) 932388-2.1 UNIT/GM-% Ointment, Apply 1 Units to affected area(s) 2 times a day. (Patient not taking: Reported on 6/12/2019), Disp: 1 Tube, Rfl: 0  •  cetirizine (ZYRTEC) 10 MG Tab, Take 1 Tab by mouth every day. (Patient not taking: Reported on 6/12/2019), Disp: 30 Tab, Rfl: 0  •  fluticasone (FLONASE) 50 MCG/ACT nasal spray, Spray 2 Sprays in nose every day. (Patient not taking: Reported on 6/12/2019), Disp: 16 g, Rfl: 0  •  albuterol 108 (90 Base) MCG/ACT Aero Soln inhalation aerosol, Inhale 2 Puffs by mouth every 6 hours as needed for Shortness of Breath. (Patient not taking: Reported on 6/12/2019), Disp: 8.5 g, Rfl: 0  •  magnesium oxide (MAG-OX) 400 MG Tab, Take 400 mg by mouth every day., Disp: , Rfl:   •  calcium carbonate (OS-JUVENAL 500) 500 MG Tab, Take 1,000 mg by mouth 2 times a day, with meals., Disp: , Rfl:   •  Cyanocobalamin (VITAMIN B-12) 3000 MCG SL Tab, Place  under tongue., Disp: , Rfl:   •  vitamin D (CHOLECALCIFEROL) 1000 UNIT Tab, Take 1,000 Units by mouth every day., Disp: , Rfl:   •  Omega 3-6-9 Fatty Acids (TRIPLE OMEGA-3-6-9) Cap, Take  by mouth., Disp: , Rfl:   •  PARoxetine (PAXIL) 20 MG Tab, Take 1 Tab by mouth every evening., Disp: 90 Tab, Rfl: 3  •  carvedilol (COREG) 6.25 MG Tab, TAKE 1 TABLET BY MOUTH TWICE DAILY WITH MEALS, Disp: 180 Tab, Rfl: 3  •  NON SPECIFIED, Nocturnal desaturation study., Disp: 1 Each, Rfl: 0  •  promethazine-dextromethorphan (PROMETHAZINE-DM) 6.25-15 MG/5ML syrup, Take 5 mL by mouth 4 times a day as needed for Cough. (Patient not taking: Reported on 6/12/2019), Disp: 140 mL, Rfl: 0  •  multivitamin (THERAGRAN) Tab, Take 1 Tab by mouth every day., Disp: , Rfl:   •  vitamin D, Ergocalciferol, (DRISDOL) 83070 UNIT CAPS capsule, Take 1,000 Units by mouth every evening., Disp: , Rfl:     PROBLEMS:  Patient Active Problem List     "Diagnosis Date Noted   • Preop cardiovascular exam 07/20/2012     Priority: High   • Nonspecific abnormal electrocardiogram (ECG) (EKG) 07/20/2012     Priority: High   • Benign hypertension 07/20/2012     Priority: Medium   • DM II (diabetes mellitus, type II), controlled ([facility]) 06/12/2019   • Vitamin D deficiency disease 12/02/2016   • Morbid obesity (HCC) 05/31/2016   • S/P gastric surgery 04/18/2016       REVIEW OF SYSTEMS:  Gen.:  No Nausea, Vomiting, fever, Chills.  Heart: No chest pain.  Lungs:  No shortness of Breath.  Psychological: Rinku unusual Anxiety depression     PHYSICAL EXAM   Constitutional: Alert, cooperative, not in acute distress.  Cardiovascular:  Rate Rhythm is regular without murmurs rubs clicks.     Thorax & Lungs: Clear to auscultation, no wheezing, rhonchi, or rales  HENT: Normocephalic, Atraumatic.  Eyes: PERRLA, EOMI, Conjunctiva normal.   Neck: Trachia is midline no swelling of the thyroid.   Lymphatic: No lymphadenopathy noted.   Musculoskeletal: Pain to palpation left upper quadrant questionable incisional hernia, no evidence of diabetic neuropathy to monofilament wire testing  Neurologic: Alert & oriented x 3, cranial nerves II through XII are intact, Normal motor function, Normal sensory function, No focal deficits noted.   Psychiatric: Affect normal, Judgment normal, Mood normal.     VITAL SIGNS:/80   Pulse 62   Temp 36.8 °C (98.2 °F) (Temporal)   Resp 13   Ht 1.638 m (5' 4.5\")   Wt (!) 131.5 kg (290 lb)   LMP 03/01/2016   SpO2 93%   BMI 49.01 kg/m²     Labs: Reviewed    Assessment:                                                     Plan:    1. Left upper quadrant pain  Ultrasound ordered  - US-ABDOMEN LTD (SOFT TISSUE); Future    2. Screening for breast cancer  Screening mammogram ordered  - MA-SCREENING MAMMO BILAT W/TOMOSYNTHESIS W/CAD; Future    3. Controlled type 2 diabetes mellitus without complication, without long-term current use of insulin (HCC)  Negative " diabetic monofilament exam, recheck hemoglobin A1c urine for micro have an area in 3 months  - Diabetic Monofilament Lower Extremity Exam  - MICROALBUMIN CREAT RATIO URINE (LAB COLLECT); Future  - HEMOGLOBIN A1C; Future

## 2019-11-22 ENCOUNTER — RX ONLY (OUTPATIENT)
Age: 49
Setting detail: RX ONLY
End: 2019-11-22

## 2019-11-22 ENCOUNTER — APPOINTMENT (RX ONLY)
Dept: URBAN - METROPOLITAN AREA CLINIC 36 | Facility: CLINIC | Age: 49
Setting detail: DERMATOLOGY
End: 2019-11-22

## 2019-11-22 DIAGNOSIS — L57.0 ACTINIC KERATOSIS: ICD-10-CM

## 2019-11-22 PROCEDURE — ? PDT: RED

## 2019-11-22 PROCEDURE — ? PHOTODYNAMIC THERAPY COUNSELING

## 2019-11-22 PROCEDURE — 96567 PDT DSTR PRMLG LES SKN: CPT

## 2019-11-22 ASSESSMENT — LOCATION ZONE DERM: LOCATION ZONE: FACE

## 2019-11-22 ASSESSMENT — LOCATION DETAILED DESCRIPTION DERM: LOCATION DETAILED: SUPERIOR MID FOREHEAD

## 2019-11-22 ASSESSMENT — LOCATION SIMPLE DESCRIPTION DERM: LOCATION SIMPLE: SUPERIOR FOREHEAD

## 2019-11-22 NOTE — HPI: PHOTODYNAMIC THERAPY (PDT)
Have You Had Previous Treatments With Pdt Before?: has not had previous treatments
When Outside In The Sun, Do You...: always burns, never tans
Additional History: Patient was given 400mg po of acyclovir due to history of cold sores prior to treatment

## 2019-11-22 NOTE — PROCEDURE: PDT: RED
Post-Care Instructions: I reviewed with the patient in detail post-care instructions. Patient is to avoid sunlight for the next 2 days, and wear sun protection. Patients may expect sunburn like redness, discomfort and scabbing.
Expiration Date (Optional): 10.2020
Who Performed The Pdt?: Performed by Nurse, MA or Aesthetician ([id number])
Ndc# (Optional): [id number]
Incubation Time (Set To 00:00:00 If Not Wanted): 01:30:00
Photosensitizer: Ameluz
Anesthesia Type: 1% lidocaine with epinephrine
Illumination Time: 7 min 07 sec
Lot # (Optional): [id number]
Debridement Text (Will Only Render In Visit Note If You Select Debridement Option Under Who Performed The Pdt Field): Prior to application of the photodynamic medication the hyperkeratotic lesions were curetted to make them more amenable to therapy.
Detail Level: Zone
Total Number Of Aks Treated (Optional To Report): 0
Consent: Written consent obtained.  The risks were reviewed with the patient including but not limited to: pigmentary changes, pain, blistering, scabbing, redness, and the remote possibility of scarring.
Number Of Kerasticks/Tubes Of Metvixia/Tubes Of Ameluz Used: 1
Application Method: without occlusion

## 2019-11-26 ENCOUNTER — APPOINTMENT (RX ONLY)
Dept: URBAN - METROPOLITAN AREA CLINIC 22 | Facility: CLINIC | Age: 49
Setting detail: DERMATOLOGY
End: 2019-11-26

## 2019-11-26 DIAGNOSIS — L81.4 OTHER MELANIN HYPERPIGMENTATION: ICD-10-CM

## 2019-11-26 DIAGNOSIS — Z71.89 OTHER SPECIFIED COUNSELING: ICD-10-CM

## 2019-11-26 DIAGNOSIS — L82.1 OTHER SEBORRHEIC KERATOSIS: ICD-10-CM

## 2019-11-26 DIAGNOSIS — D18.0 HEMANGIOMA: ICD-10-CM

## 2019-11-26 DIAGNOSIS — Z85.828 PERSONAL HISTORY OF OTHER MALIGNANT NEOPLASM OF SKIN: ICD-10-CM

## 2019-11-26 DIAGNOSIS — L73.8 OTHER SPECIFIED FOLLICULAR DISORDERS: ICD-10-CM

## 2019-11-26 DIAGNOSIS — L57.0 ACTINIC KERATOSIS: ICD-10-CM

## 2019-11-26 DIAGNOSIS — D22 MELANOCYTIC NEVI: ICD-10-CM

## 2019-11-26 PROBLEM — D18.01 HEMANGIOMA OF SKIN AND SUBCUTANEOUS TISSUE: Status: ACTIVE | Noted: 2019-11-26

## 2019-11-26 PROBLEM — D22.5 MELANOCYTIC NEVI OF TRUNK: Status: ACTIVE | Noted: 2019-11-26

## 2019-11-26 PROCEDURE — 17003 DESTRUCT PREMALG LES 2-14: CPT

## 2019-11-26 PROCEDURE — ? LIQUID NITROGEN

## 2019-11-26 PROCEDURE — 17000 DESTRUCT PREMALG LESION: CPT

## 2019-11-26 PROCEDURE — ? COUNSELING

## 2019-11-26 PROCEDURE — 99214 OFFICE O/P EST MOD 30 MIN: CPT | Mod: 25

## 2019-11-26 ASSESSMENT — LOCATION ZONE DERM
LOCATION ZONE: TRUNK
LOCATION ZONE: NOSE
LOCATION ZONE: FACE
LOCATION ZONE: ARM

## 2019-11-26 ASSESSMENT — LOCATION DETAILED DESCRIPTION DERM
LOCATION DETAILED: LEFT SUPERIOR TEMPLE
LOCATION DETAILED: LEFT MEDIAL FOREHEAD
LOCATION DETAILED: RIGHT SUPERIOR MEDIAL MIDBACK
LOCATION DETAILED: LEFT SUPERIOR LATERAL FOREHEAD
LOCATION DETAILED: LEFT INFERIOR CENTRAL MALAR CHEEK
LOCATION DETAILED: RIGHT INFERIOR LATERAL FOREHEAD
LOCATION DETAILED: RIGHT PROXIMAL DORSAL FOREARM
LOCATION DETAILED: EPIGASTRIC SKIN
LOCATION DETAILED: RIGHT INFERIOR CENTRAL MALAR CHEEK
LOCATION DETAILED: RIGHT SUPERIOR LATERAL MALAR CHEEK
LOCATION DETAILED: LEFT SUPERIOR MEDIAL UPPER BACK
LOCATION DETAILED: NASAL ROOT

## 2019-11-26 ASSESSMENT — LOCATION SIMPLE DESCRIPTION DERM
LOCATION SIMPLE: LEFT UPPER BACK
LOCATION SIMPLE: ABDOMEN
LOCATION SIMPLE: LEFT TEMPLE
LOCATION SIMPLE: LEFT CHEEK
LOCATION SIMPLE: LEFT FOREHEAD
LOCATION SIMPLE: RIGHT LOWER BACK
LOCATION SIMPLE: RIGHT CHEEK
LOCATION SIMPLE: RIGHT FOREHEAD
LOCATION SIMPLE: NOSE
LOCATION SIMPLE: RIGHT FOREARM

## 2019-12-02 ENCOUNTER — OFFICE VISIT (OUTPATIENT)
Dept: URGENT CARE | Facility: PHYSICIAN GROUP | Age: 49
End: 2019-12-02
Payer: COMMERCIAL

## 2019-12-02 VITALS
DIASTOLIC BLOOD PRESSURE: 78 MMHG | HEART RATE: 97 BPM | WEIGHT: 291 LBS | RESPIRATION RATE: 20 BRPM | HEIGHT: 65 IN | BODY MASS INDEX: 48.48 KG/M2 | OXYGEN SATURATION: 93 % | TEMPERATURE: 98 F | SYSTOLIC BLOOD PRESSURE: 132 MMHG

## 2019-12-02 DIAGNOSIS — J32.9 SINUSITIS, UNSPECIFIED CHRONICITY, UNSPECIFIED LOCATION: ICD-10-CM

## 2019-12-02 DIAGNOSIS — R05.9 COUGH: ICD-10-CM

## 2019-12-02 DIAGNOSIS — J06.9 UPPER RESPIRATORY INFECTION WITH COUGH AND CONGESTION: ICD-10-CM

## 2019-12-02 LAB
FLUAV+FLUBV AG SPEC QL IA: NEGATIVE
INT CON NEG: NEGATIVE
INT CON POS: POSITIVE

## 2019-12-02 PROCEDURE — 99214 OFFICE O/P EST MOD 30 MIN: CPT | Performed by: NURSE PRACTITIONER

## 2019-12-02 PROCEDURE — 87804 INFLUENZA ASSAY W/OPTIC: CPT | Performed by: NURSE PRACTITIONER

## 2019-12-02 RX ORDER — METHYLPREDNISOLONE 4 MG/1
TABLET ORAL
Qty: 1 PACKAGE | Refills: 0 | Status: SHIPPED | OUTPATIENT
Start: 2019-12-02 | End: 2020-02-17

## 2019-12-02 ASSESSMENT — ENCOUNTER SYMPTOMS
COUGH: 1
SINUS PAIN: 1
SORE THROAT: 1
SHORTNESS OF BREATH: 0
NEUROLOGICAL NEGATIVE: 1
CHILLS: 1

## 2019-12-02 NOTE — PROGRESS NOTES
Subjective:     Logan Mahoney is a 49 y.o. female who presents for Pharyngitis (Sore throat, bilat ear pain, swollen glands  x2days )       Pharyngitis    This is a new problem. The current episode started yesterday. The problem has been gradually worsening. Associated symptoms include congestion, coughing and ear pain. Pertinent negatives include no shortness of breath. Treatments tried: OTC cold/flu medication. The treatment provided no relief.     Has not had a flu shot this season.  Patient reports that the home has been cases of the cold and flu.  Patient reports finishing a course of antibiotics 3 days ago.    PMH:  has a past medical history of Anemia, Anesthesia, Arthritis, Benign hypertension (7/20/2012), DM II (diabetes mellitus, type II), controlled ([facility]) (6/12/2019), Heart burn, Morbid obesity ([facility]) (5/31/2016), Nonspecific abnormal electrocardiogram (ECG) (EKG) (7/20/2012), Preop cardiovascular exam (7/20/2012), S/P gastric surgery (4/18/2016), and Vitamin D deficiency disease (12/2/2016).    MEDS:   Current Outpatient Medications:   •  methylPREDNISolone (MEDROL DOSEPAK) 4 MG Tablet Therapy Pack, Use as directed on package., Disp: 1 Package, Rfl: 0  •  minocycline (MINOCIN) 100 MG Cap, TK 1 C PO BID WITH FOOD AND A FULL GLASS OF WATER, Disp: , Rfl: 1  •  Blood Glucose Monitoring Suppl (ONE TOUCH ULTRA 2) w/Device Kit, USE FOR BLOOD SUGAR TEST UTD, Disp: , Rfl: 0  •  JARDIANCE 10 MG Tab, TK 1 T PO QD, Disp: , Rfl: 3  •  ONE TOUCH ULTRA TEST strip, USE ONE STRIP FOR BLOOD SUGAR TEST D AND PRN, Disp: , Rfl: 3  •  ONETOUCH DELICA LANCETS 33G Misc, USE ONE LANCET FOR BLOOD SUGAR TEST D AND PRN, Disp: , Rfl: 3  •  PARoxetine (PAXIL) 20 MG Tab, TAKE 1 TABLET BY MOUTH EVERY DAY, Disp: 90 Tab, Rfl: 4  •  NON SPECIFIED, Glucometer and Test Strips and related supplies as covered by insurance.  QS for 90 days check qd and prn, Disp: 150 Each, Rfl: 3  •  metFORMIN (GLUCOPHAGE) 500 MG Tab, Take 1 Tab by  mouth 2 times a day, with meals., Disp: 180 Tab, Rfl: 3  •  nystatin-triamcinalone (MYCOLOG) 613811-1.1 UNIT/GM-% Ointment, Apply 1 Units to affected area(s) 2 times a day., Disp: 1 Tube, Rfl: 0  •  cetirizine (ZYRTEC) 10 MG Tab, Take 1 Tab by mouth every day., Disp: 30 Tab, Rfl: 0  •  fluticasone (FLONASE) 50 MCG/ACT nasal spray, Spray 2 Sprays in nose every day., Disp: 16 g, Rfl: 0  •  magnesium oxide (MAG-OX) 400 MG Tab, Take 400 mg by mouth every day., Disp: , Rfl:   •  calcium carbonate (OS-JUVENAL 500) 500 MG Tab, Take 1,000 mg by mouth 2 times a day, with meals., Disp: , Rfl:   •  Cyanocobalamin (VITAMIN B-12) 3000 MCG SL Tab, Place  under tongue., Disp: , Rfl:   •  vitamin D (CHOLECALCIFEROL) 1000 UNIT Tab, Take 1,000 Units by mouth every day., Disp: , Rfl:   •  Omega 3-6-9 Fatty Acids (TRIPLE OMEGA-3-6-9) Cap, Take  by mouth., Disp: , Rfl:   •  PARoxetine (PAXIL) 20 MG Tab, Take 1 Tab by mouth every evening., Disp: 90 Tab, Rfl: 3  •  carvedilol (COREG) 6.25 MG Tab, TAKE 1 TABLET BY MOUTH TWICE DAILY WITH MEALS, Disp: 180 Tab, Rfl: 3  •  NON SPECIFIED, Nocturnal desaturation study., Disp: 1 Each, Rfl: 0  •  promethazine-dextromethorphan (PROMETHAZINE-DM) 6.25-15 MG/5ML syrup, Take 5 mL by mouth 4 times a day as needed for Cough., Disp: 140 mL, Rfl: 0  •  multivitamin (THERAGRAN) Tab, Take 1 Tab by mouth every day., Disp: , Rfl:   •  vitamin D, Ergocalciferol, (DRISDOL) 11935 UNIT CAPS capsule, Take 1,000 Units by mouth every evening., Disp: , Rfl:   •  ALBUTEROL SULFATE HFA INH, INHALE 2 PUFFS PO Q 6 H PRN FOR SOB, Disp: , Rfl: 0  •  acyclovir (ZOVIRAX) 400 MG tablet, TK ONE T PO TID WF AND FULL GLASS OF WATER FOR 7 DAYS, Disp: , Rfl: 1  •  amoxicillin-clavulanate (AUGMENTIN) 875-125 MG Tab, Take 1 Tab by mouth 2 times a day. (Patient not taking: Reported on 12/2/2019), Disp: 20 Tab, Rfl: 0  •  albuterol 108 (90 Base) MCG/ACT Aero Soln inhalation aerosol, Inhale 2 Puffs by mouth every 6 hours as needed for  "Shortness of Breath. (Patient not taking: Reported on 12/2/2019), Disp: 8.5 g, Rfl: 0    ALLERGIES:   Allergies   Allergen Reactions   • Ceclor [Cefaclor] Vomiting   • Cinnamon Vomiting   • Eggs Vomiting   • Latex Hives   • Peppers [Pepper-Bell Food Allergy] Vomiting   • Pineapple Hives and Nausea     Only skin     SURGHX:   Past Surgical History:   Procedure Laterality Date   • LAPAROSCOPIC BAND REMOVAL  11/5/2018    Procedure: LAPAROSCOPIC BAND REMOVAL- GASTRIC BAND and Port;  Surgeon: John H Ganser, M.D.;  Location: SURGERY [facility];  Service: General   • OTHER ABDOMINAL SURGERY  2013    gastric band w/port   • KNEE ARTHROSCOPY  1990    Arthroscopy, Knee R   • TONSILLECTOMY       SOCHX:  reports that she has never smoked. She has never used smokeless tobacco. She reports that she does not drink alcohol or use drugs.     FH: Reviewed with patient, not pertinent to this visit.    Review of Systems   Constitutional: Positive for chills and malaise/fatigue.   HENT: Positive for congestion, ear pain, sinus pain (Pressure, radiating to left upper teeth) and sore throat.    Respiratory: Positive for cough. Negative for shortness of breath.    Neurological: Negative.    All other systems reviewed and are negative.    Objective:     /78   Pulse 97   Temp 36.7 °C (98 °F) (Temporal)   Resp 20   Ht 1.638 m (5' 4.5\")   Wt (!) 132 kg (291 lb)   LMP 03/01/2016   SpO2 93%   BMI 49.18 kg/m²     Physical Exam  Vitals signs reviewed.   Constitutional:       General: She is not in acute distress.     Appearance: She is well-developed. She is not toxic-appearing or diaphoretic.   HENT:      Head: Normocephalic.      Right Ear: Tympanic membrane and external ear normal.      Left Ear: Tympanic membrane and external ear normal.      Nose: Mucosal edema and rhinorrhea present.      Right Sinus: Maxillary sinus tenderness present.      Left Sinus: Maxillary sinus tenderness present.      Mouth/Throat:      Mouth: " Mucous membranes are moist.      Pharynx: Uvula midline. Pharyngeal swelling and posterior oropharyngeal erythema present.      Comments: Postnasal drip  Eyes:      Extraocular Movements: Extraocular movements intact.      Conjunctiva/sclera: Conjunctivae normal.      Pupils: Pupils are equal, round, and reactive to light.   Neck:      Musculoskeletal: Normal range of motion.   Cardiovascular:      Rate and Rhythm: Normal rate and regular rhythm.      Pulses: Normal pulses.      Heart sounds: Normal heart sounds.   Pulmonary:      Effort: Pulmonary effort is normal. No respiratory distress.      Breath sounds: Normal breath sounds. No decreased breath sounds, wheezing, rhonchi or rales.   Abdominal:      General: Bowel sounds are normal.      Palpations: Abdomen is soft.      Tenderness: There is no tenderness.   Musculoskeletal: Normal range of motion.         General: No deformity.   Lymphadenopathy:      Cervical: No cervical adenopathy.   Skin:     General: Skin is warm and dry.      Capillary Refill: Capillary refill takes less than 2 seconds.      Coloration: Skin is not pale.   Neurological:      Mental Status: She is alert and oriented to person, place, and time.      Sensory: No sensory deficit.      Coordination: Coordination normal.   Psychiatric:         Behavior: Behavior normal. Behavior is cooperative.       Influenza A/B swab: negative       Assessment/Plan:     1. Upper respiratory infection with cough and congestion    2. Cough  - POCT Influenza A/B    3. Sinusitis, unspecified chronicity, unspecified location  - methylPREDNISolone (MEDROL DOSEPAK) 4 MG Tablet Therapy Pack; Use as directed on package.  Dispense: 1 Package; Refill: 0    Various differentials discussed including allergic vs viral vs bacterial etiologies.    Discussed likely self-limiting viral etiology and expected course and duration of illness.    Rx as above sent electronically.    Discussed OTC decongestants (e.g. Sudafed),  antihistamines, Flonase, and nasal saline rinses/neti pot.    Discussed close monitoring and supportive measures including increasing fluids and rest as well as OTC symptom management.    Vital signs stable, afebrile, asymptomatic, no acute distress.    Warning signs reviewed. Strict return/ER precautions advised.    Patient advised to: Return for 1) Symptoms don't improve or worsen, or go to ER, 2) Follow up with primary care in 7-10 days.    Differential diagnosis, natural history, supportive care, and indications for immediate follow-up discussed. All questions answered. Patient agrees with the plan of care.

## 2019-12-02 NOTE — LETTER
December 2, 2019         Patient: Loagn Mahoney   YOB: 1970   Date of Visit: 12/2/2019           To Whom it May Concern:    Logan Mahoney was seen in my clinic on 12/2/2019 and has missed work due to illness. The patient may return to work 12/5/2019 or sooner if the patient is feeling better.     If you have any questions or concerns, please don't hesitate to call.        Sincerely,           ROLO Shaver.  Electronically Signed

## 2020-01-10 NOTE — DISCHARGE INSTR - OTHER INFO
Discharge home when alert, comfortable, ambulatory, and tolerating PO well.  Pt counseled re: diet, activity, home med's, and wound care.  Full liquids x 1-2 days, then advance diet as tolerated to soft foods. Continue soft foods until postop appt.  May shower over Tegaderms.   Ok to remove Tegaderms on 11/09/18. May continue to shower once bandages removed, but no baths/soaks x 2 weeks.  No driving for 4-5 days.  No lifting >15 lbs for 1-2 weeks.  F/U with Dr. Ganser in 1-2 weeks   no

## 2020-01-20 ENCOUNTER — HOSPITAL ENCOUNTER (OUTPATIENT)
Dept: RADIOLOGY | Facility: MEDICAL CENTER | Age: 50
End: 2020-01-20
Attending: INTERNAL MEDICINE
Payer: COMMERCIAL

## 2020-01-20 DIAGNOSIS — R10.12 LEFT UPPER QUADRANT PAIN: ICD-10-CM

## 2020-01-20 DIAGNOSIS — Z12.39 SCREENING FOR BREAST CANCER: ICD-10-CM

## 2020-01-20 DIAGNOSIS — Z91.89 OTHER SPECIFIED PERSONAL RISK FACTORS, NOT ELSEWHERE CLASSIFIED: ICD-10-CM

## 2020-01-20 PROCEDURE — 77067 SCR MAMMO BI INCL CAD: CPT

## 2020-01-20 PROCEDURE — 76705 ECHO EXAM OF ABDOMEN: CPT

## 2020-01-30 ENCOUNTER — HOSPITAL ENCOUNTER (OUTPATIENT)
Dept: RADIOLOGY | Facility: MEDICAL CENTER | Age: 50
End: 2020-01-30
Attending: INTERNAL MEDICINE
Payer: COMMERCIAL

## 2020-01-30 DIAGNOSIS — Z91.89 OTHER SPECIFIED PERSONAL RISK FACTORS, NOT ELSEWHERE CLASSIFIED: ICD-10-CM

## 2020-01-30 PROCEDURE — 4410556 CT-CARDIAC SCORING

## 2020-02-10 ENCOUNTER — OFFICE VISIT (OUTPATIENT)
Dept: URGENT CARE | Facility: PHYSICIAN GROUP | Age: 50
End: 2020-02-10
Payer: COMMERCIAL

## 2020-02-10 ENCOUNTER — PATIENT MESSAGE (OUTPATIENT)
Dept: HEALTH INFORMATION MANAGEMENT | Facility: OTHER | Age: 50
End: 2020-02-10

## 2020-02-10 VITALS
HEART RATE: 102 BPM | RESPIRATION RATE: 16 BRPM | TEMPERATURE: 99.6 F | SYSTOLIC BLOOD PRESSURE: 124 MMHG | BODY MASS INDEX: 49.68 KG/M2 | WEIGHT: 291 LBS | OXYGEN SATURATION: 93 % | HEIGHT: 64 IN | DIASTOLIC BLOOD PRESSURE: 92 MMHG

## 2020-02-10 DIAGNOSIS — R50.9 FEVER, UNSPECIFIED FEVER CAUSE: ICD-10-CM

## 2020-02-10 DIAGNOSIS — R05.9 COUGH: ICD-10-CM

## 2020-02-10 DIAGNOSIS — J10.1 INFLUENZA A: ICD-10-CM

## 2020-02-10 LAB
FLUAV+FLUBV AG SPEC QL IA: NORMAL
INT CON NEG: NEGATIVE
INT CON POS: POSITIVE

## 2020-02-10 PROCEDURE — 87804 INFLUENZA ASSAY W/OPTIC: CPT | Performed by: PHYSICIAN ASSISTANT

## 2020-02-10 PROCEDURE — 99214 OFFICE O/P EST MOD 30 MIN: CPT | Performed by: PHYSICIAN ASSISTANT

## 2020-02-10 RX ORDER — CODEINE PHOSPHATE/GUAIFENESIN 10-100MG/5
10 LIQUID (ML) ORAL 4 TIMES DAILY PRN
Qty: 200 ML | Refills: 0 | Status: SHIPPED | OUTPATIENT
Start: 2020-02-10 | End: 2020-02-15

## 2020-02-10 ASSESSMENT — ENCOUNTER SYMPTOMS: COUGH: 1

## 2020-02-10 NOTE — PROGRESS NOTES
Subjective:      Logan Mahoney is a 49 y.o. female who presents with Cough (ear/tube pain, scratchy throat, headache, low-grade fever, bodyaches x1day)    PMH:  has a past medical history of Anemia, Anesthesia, Arthritis, Benign hypertension (7/20/2012), DM II (diabetes mellitus, type II), controlled ([facility]) (6/12/2019), Heart burn, Morbid obesity ([facility]) (5/31/2016), Nonspecific abnormal electrocardiogram (ECG) (EKG) (7/20/2012), Preop cardiovascular exam (7/20/2012), S/P gastric surgery (4/18/2016), and Vitamin D deficiency disease (12/2/2016).  MEDS:   Current Outpatient Medications:   •  methylPREDNISolone (MEDROL DOSEPAK) 4 MG Tablet Therapy Pack, Use as directed on package., Disp: 1 Package, Rfl: 0  •  minocycline (MINOCIN) 100 MG Cap, TK 1 C PO BID WITH FOOD AND A FULL GLASS OF WATER, Disp: , Rfl: 1  •  Blood Glucose Monitoring Suppl (ONE TOUCH ULTRA 2) w/Device Kit, USE FOR BLOOD SUGAR TEST UTD, Disp: , Rfl: 0  •  JARDIANCE 10 MG Tab, TK 1 T PO QD, Disp: , Rfl: 3  •  ONE TOUCH ULTRA TEST strip, USE ONE STRIP FOR BLOOD SUGAR TEST D AND PRN, Disp: , Rfl: 3  •  ONETOUCH DELICA LANCETS 33G Misc, USE ONE LANCET FOR BLOOD SUGAR TEST D AND PRN, Disp: , Rfl: 3  •  ALBUTEROL SULFATE HFA INH, INHALE 2 PUFFS PO Q 6 H PRN FOR SOB, Disp: , Rfl: 0  •  acyclovir (ZOVIRAX) 400 MG tablet, TK ONE T PO TID WF AND FULL GLASS OF WATER FOR 7 DAYS, Disp: , Rfl: 1  •  PARoxetine (PAXIL) 20 MG Tab, TAKE 1 TABLET BY MOUTH EVERY DAY, Disp: 90 Tab, Rfl: 4  •  NON SPECIFIED, Glucometer and Test Strips and related supplies as covered by insurance.  QS for 90 days check qd and prn, Disp: 150 Each, Rfl: 3  •  metFORMIN (GLUCOPHAGE) 500 MG Tab, Take 1 Tab by mouth 2 times a day, with meals., Disp: 180 Tab, Rfl: 3  •  amoxicillin-clavulanate (AUGMENTIN) 875-125 MG Tab, Take 1 Tab by mouth 2 times a day. (Patient not taking: Reported on 12/2/2019), Disp: 20 Tab, Rfl: 0  •  nystatin-triamcinalone (MYCOLOG) 146409-5.1 UNIT/GM-% Ointment,  Apply 1 Units to affected area(s) 2 times a day., Disp: 1 Tube, Rfl: 0  •  cetirizine (ZYRTEC) 10 MG Tab, Take 1 Tab by mouth every day., Disp: 30 Tab, Rfl: 0  •  fluticasone (FLONASE) 50 MCG/ACT nasal spray, Spray 2 Sprays in nose every day., Disp: 16 g, Rfl: 0  •  albuterol 108 (90 Base) MCG/ACT Aero Soln inhalation aerosol, Inhale 2 Puffs by mouth every 6 hours as needed for Shortness of Breath. (Patient not taking: Reported on 12/2/2019), Disp: 8.5 g, Rfl: 0  •  magnesium oxide (MAG-OX) 400 MG Tab, Take 400 mg by mouth every day., Disp: , Rfl:   •  calcium carbonate (OS-JUVENAL 500) 500 MG Tab, Take 1,000 mg by mouth 2 times a day, with meals., Disp: , Rfl:   •  Cyanocobalamin (VITAMIN B-12) 3000 MCG SL Tab, Place  under tongue., Disp: , Rfl:   •  vitamin D (CHOLECALCIFEROL) 1000 UNIT Tab, Take 1,000 Units by mouth every day., Disp: , Rfl:   •  Omega 3-6-9 Fatty Acids (TRIPLE OMEGA-3-6-9) Cap, Take  by mouth., Disp: , Rfl:   •  PARoxetine (PAXIL) 20 MG Tab, Take 1 Tab by mouth every evening., Disp: 90 Tab, Rfl: 3  •  carvedilol (COREG) 6.25 MG Tab, TAKE 1 TABLET BY MOUTH TWICE DAILY WITH MEALS, Disp: 180 Tab, Rfl: 3  •  NON SPECIFIED, Nocturnal desaturation study., Disp: 1 Each, Rfl: 0  •  multivitamin (THERAGRAN) Tab, Take 1 Tab by mouth every day., Disp: , Rfl:   •  vitamin D, Ergocalciferol, (DRISDOL) 23003 UNIT CAPS capsule, Take 1,000 Units by mouth every evening., Disp: , Rfl:   ALLERGIES:   Allergies   Allergen Reactions   • Ceclor [Cefaclor] Vomiting   • Cinnamon Vomiting   • Eggs Vomiting   • Latex Hives   • Peppers [Pepper-Bell Food Allergy] Vomiting   • Pineapple Hives and Nausea     Only skin     SURGHX:   Past Surgical History:   Procedure Laterality Date   • LAPAROSCOPIC BAND REMOVAL  11/5/2018    Procedure: LAPAROSCOPIC BAND REMOVAL- GASTRIC BAND and Port;  Surgeon: John H Ganser, M.D.;  Location: SURGERY [facility];  Service: General   • OTHER ABDOMINAL SURGERY  2013    gastric band w/port   •  "KNEE ARTHROSCOPY  1990    Arthroscopy, Knee R   • TONSILLECTOMY       SOCHX:  reports that she has never smoked. She has never used smokeless tobacco. She reports that she does not drink alcohol or use drugs.  FH: Reviewed with patient, not pertinent to this visit.           Patient presents with:  Cough: ear/tube pain, scratchy throat, headache, low-grade fever, bodyaches x1day        Influenza   This is a new problem. The current episode started yesterday. The problem occurs constantly. The problem has been rapidly worsening. Associated symptoms include chills, congestion, coughing, a fever, headaches, myalgias and a sore throat. Pertinent negatives include no chest pain, nausea or vomiting. The symptoms are aggravated by coughing and exertion. She has tried acetaminophen, NSAIDs, lying down and rest for the symptoms. The treatment provided no relief.       Review of Systems   Constitutional: Positive for chills, fever and malaise/fatigue.   HENT: Positive for congestion, ear pain and sore throat.    Eyes: Negative for discharge.   Respiratory: Positive for cough and sputum production. Negative for shortness of breath.    Cardiovascular: Negative for chest pain.   Gastrointestinal: Negative for diarrhea, nausea and vomiting.   Musculoskeletal: Positive for myalgias.   Neurological: Positive for headaches.   All other systems reviewed and are negative.         Objective:     /92 (BP Location: Right arm, Patient Position: Sitting, BP Cuff Size: Large adult)   Pulse (!) 102   Temp 37.6 °C (99.6 °F) (Temporal)   Resp 16   Ht 1.626 m (5' 4\")   Wt (!) 132 kg (291 lb)   LMP 03/01/2016   SpO2 93%   BMI 49.95 kg/m²      Physical Exam  Vitals signs and nursing note reviewed.   Constitutional:       General: She is not in acute distress.     Appearance: Normal appearance. She is well-developed. She is not toxic-appearing or diaphoretic.   HENT:      Head: Normocephalic and atraumatic.      Right Ear: Tympanic " membrane normal.      Left Ear: Tympanic membrane normal.      Nose: Mucosal edema, congestion and rhinorrhea present.      Mouth/Throat:      Mouth: Mucous membranes are moist.      Pharynx: Uvula midline. Posterior oropharyngeal erythema present.      Tonsils: No tonsillar exudate.   Eyes:      General: Lids are normal.      Extraocular Movements: Extraocular movements intact.      Conjunctiva/sclera:      Right eye: Right conjunctiva is injected.      Left eye: Left conjunctiva is injected.      Pupils: Pupils are equal, round, and reactive to light.   Neck:      Musculoskeletal: Normal range of motion and neck supple.   Cardiovascular:      Rate and Rhythm: Regular rhythm. Tachycardia present.      Pulses: Normal pulses.      Heart sounds: Normal heart sounds.   Pulmonary:      Effort: Pulmonary effort is normal. No respiratory distress.      Breath sounds: No wheezing or rales.   Abdominal:      Palpations: Abdomen is soft.   Musculoskeletal: Normal range of motion.   Skin:     General: Skin is warm and dry.      Capillary Refill: Capillary refill takes less than 2 seconds.   Neurological:      General: No focal deficit present.      Mental Status: She is alert and oriented to person, place, and time.      Gait: Gait normal.   Psychiatric:         Mood and Affect: Mood normal.         Behavior: Behavior is cooperative.            Flu: positive A     Assessment/Plan:     1. Influenza A  guaifenesin-codeine (TUSSI-ORGANIDIN NR) 100-10 MG/5ML syrup   2. Cough  guaifenesin-codeine (TUSSI-ORGANIDIN NR) 100-10 MG/5ML syrup   3. Fever, unspecified fever cause  guaifenesin-codeine (TUSSI-ORGANIDIN NR) 100-10 MG/5ML syrup     PT has the flu, however, patient declined treatment with Tamiflu.  This was discussed with patient/parent who verbalized understanding of discussion.    PT can continue OTC medications, increase fluids and rest until symptoms improve.     PT advised saltwater gargles/swishes  3-4 times daily until  symptoms improve.     PT instructed not to drive or operate heavy machinery or drink alcohol while taking this medication because it contains either a narcotic or benzodiazepines which causes drowsiness. PT verbalized understanding of these instructions.     [facility] Aware web site evaluation: I have obtained and reviewed patient utilization report from [facility] pharmacy database prior to writing prescription for controlled substance.  No history of abuse.    PT should follow up with PCP in 1-2 days for re-evaluation if symptoms have not improved.  Discussed red flags and reasons to return to  or ED.  Pt and/or family verbalized understanding of diagnosis and follow up instructions and was offered informational handout on diagnosis.  PT discharged.

## 2020-02-10 NOTE — LETTER
February 10, 2020         Patient: Logan Mahoney   YOB: 1970   Date of Visit: 2/10/2020           To Whom it May Concern:    Logan Mahoney was seen in my clinic on 2/10/2020. She has been diagnosed with Influenza A.      She may return to work on 2/17/2020.    If you have any questions or concerns, please don't hesitate to call.        Sincerely,           Zulay Gomez P.A.-C.  Electronically Signed

## 2020-02-15 ASSESSMENT — ENCOUNTER SYMPTOMS
SPUTUM PRODUCTION: 1
EYE DISCHARGE: 0
CHILLS: 1
NAUSEA: 0
MYALGIAS: 1
VOMITING: 0
SHORTNESS OF BREATH: 0
HEADACHES: 1
DIARRHEA: 0
SORE THROAT: 1
FEVER: 1

## 2020-02-17 ENCOUNTER — OFFICE VISIT (OUTPATIENT)
Dept: URGENT CARE | Facility: PHYSICIAN GROUP | Age: 50
End: 2020-02-17
Payer: COMMERCIAL

## 2020-02-17 VITALS
DIASTOLIC BLOOD PRESSURE: 82 MMHG | RESPIRATION RATE: 16 BRPM | HEIGHT: 64 IN | WEIGHT: 291 LBS | BODY MASS INDEX: 49.68 KG/M2 | TEMPERATURE: 98.7 F | HEART RATE: 62 BPM | SYSTOLIC BLOOD PRESSURE: 124 MMHG | OXYGEN SATURATION: 94 %

## 2020-02-17 DIAGNOSIS — J06.9 URI WITH COUGH AND CONGESTION: ICD-10-CM

## 2020-02-17 DIAGNOSIS — J40 BRONCHITIS: Primary | ICD-10-CM

## 2020-02-17 PROCEDURE — 99214 OFFICE O/P EST MOD 30 MIN: CPT | Performed by: PHYSICIAN ASSISTANT

## 2020-02-17 RX ORDER — PROMETHAZINE HYDROCHLORIDE AND CODEINE PHOSPHATE 6.25; 1 MG/5ML; MG/5ML
5 SYRUP ORAL 4 TIMES DAILY PRN
Qty: 120 ML | Refills: 0 | Status: SHIPPED | OUTPATIENT
Start: 2020-02-17 | End: 2020-02-24

## 2020-02-17 RX ORDER — DOXYCYCLINE HYCLATE 100 MG
100 TABLET ORAL 2 TIMES DAILY
Qty: 14 TAB | Refills: 0 | Status: SHIPPED | OUTPATIENT
Start: 2020-02-17 | End: 2021-10-25

## 2020-02-17 RX ORDER — METHYLPREDNISOLONE 4 MG/1
TABLET ORAL
Qty: 21 TAB | Refills: 0 | Status: SHIPPED | OUTPATIENT
Start: 2020-02-17 | End: 2021-10-25

## 2020-02-17 NOTE — LETTER
February 17, 2020       Patient: Logan Mahoney   YOB: 1970   Date of Visit: 2/17/2020         To Whom It May Concern:    It is my medical opinion that Logan Mahoney may be excused from work for the dates of 2/17/20-2/21/20.      If you have any questions or concerns, please don't hesitate to call [phone]          Sincerely,          Abdelrahman Britt P.A.-C.  Electronically Signed

## 2020-02-18 NOTE — PROGRESS NOTES
Subjective:      Pt is a 50 y.o. female who presents with Cough (congestion, body aches, fatigue )            HPI  This is a new problem. Pt notes 7 days ago, diagnosed with FLU A and notes was getting better but then something new started x 6 days. Pt has not taken any Rx medications for this condition other than cough syrup which is not helping currently. Pt declined Tamiflu at last visit.  Pt states the pain is a 7/10, aching in nature and worse at night. Pt denies CP, SOB, NVD, paresthesias, headaches, dizziness, change in vision, hives, or other joint pain. The pt's medication list, problem list, and allergies have been evaluated and reviewed during today's visit.    PMH:  Past Medical History:   Diagnosis Date   • Anemia    • Anesthesia     ponv   • Arthritis     knees/neck/elbows   • Benign hypertension 7/20/2012   • DM II (diabetes mellitus, type II), controlled ([facility]) 6/12/2019   • Heart burn     abdominal   • Morbid obesity ([facility]) 5/31/2016   • Nonspecific abnormal electrocardiogram (ECG) (EKG) 7/20/2012   • Preop cardiovascular exam 7/20/2012   • S/P gastric surgery 4/18/2016    [facility]    • Vitamin D deficiency disease 12/2/2016       PSH:  Past Surgical History:   Procedure Laterality Date   • LAPAROSCOPIC BAND REMOVAL  11/5/2018    Procedure: LAPAROSCOPIC BAND REMOVAL- GASTRIC BAND and Port;  Surgeon: John H Ganser, M.D.;  Location: SURGERY [facility];  Service: General   • OTHER ABDOMINAL SURGERY  2013    gastric band w/port   • KNEE ARTHROSCOPY  1990    Arthroscopy, Knee R   • TONSILLECTOMY         Fam Hx:    family history includes Heart Attack in her father and paternal grandfather; Hypertension in her father and mother; Other in her father; Stroke in her father.  Family Status   Relation Name Status   • Mo  (Not Specified)   • Fa  (Not Specified)   • PGFa  (Not Specified)       Soc HX:  Social History     Socioeconomic History   • Marital status:      Spouse name: Not  on file   • Number of children: Not on file   • Years of education: Not on file   • Highest education level: Not on file   Occupational History   • Not on file   Social Needs   • Financial resource strain: Not on file   • Food insecurity     Worry: Not on file     Inability: Not on file   • Transportation needs     Medical: Not on file     Non-medical: Not on file   Tobacco Use   • Smoking status: Never Smoker   • Smokeless tobacco: Never Used   Substance and Sexual Activity   • Alcohol use: No   • Drug use: No   • Sexual activity: Yes     Partners: Male   Lifestyle   • Physical activity     Days per week: Not on file     Minutes per session: Not on file   • Stress: Not on file   Relationships   • Social connections     Talks on phone: Not on file     Gets together: Not on file     Attends Rastafarian service: Not on file     Active member of club or organization: Not on file     Attends meetings of clubs or organizations: Not on file     Relationship status: Not on file   • Intimate partner violence     Fear of current or ex partner: Not on file     Emotionally abused: Not on file     Physically abused: Not on file     Forced sexual activity: Not on file   Other Topics Concern   • Not on file   Social History Narrative   • Not on file         Medications:    Current Outpatient Medications:   •  doxycycline (VIBRAMYCIN) 100 MG Tab, Take 1 Tab by mouth 2 times a day., Disp: 14 Tab, Rfl: 0  •  methylPREDNISolone (MEDROL DOSEPAK) 4 MG Tablet Therapy Pack, Follow schedule on package instructions., Disp: 21 Tab, Rfl: 0  •  promethazine-codeine (PHENERGAN-CODEINE) 6.25-10 MG/5ML Syrup, Take 5 mL by mouth 4 times a day as needed for Cough for up to 7 days., Disp: 120 mL, Rfl: 0  •  Blood Glucose Monitoring Suppl (ONE TOUCH ULTRA 2) w/Device Kit, USE FOR BLOOD SUGAR TEST UTD, Disp: , Rfl: 0  •  JARDIANCE 10 MG Tab, TK 1 T PO QD, Disp: , Rfl: 3  •  ONE TOUCH ULTRA TEST strip, USE ONE STRIP FOR BLOOD SUGAR TEST D AND PRN, Disp:  , Rfl: 3  •  ONETOUCH DELICA LANCETS 33G Misc, USE ONE LANCET FOR BLOOD SUGAR TEST D AND PRN, Disp: , Rfl: 3  •  ALBUTEROL SULFATE HFA INH, INHALE 2 PUFFS PO Q 6 H PRN FOR SOB, Disp: , Rfl: 0  •  acyclovir (ZOVIRAX) 400 MG tablet, TK ONE T PO TID WF AND FULL GLASS OF WATER FOR 7 DAYS, Disp: , Rfl: 1  •  PARoxetine (PAXIL) 20 MG Tab, TAKE 1 TABLET BY MOUTH EVERY DAY, Disp: 90 Tab, Rfl: 4  •  NON SPECIFIED, Glucometer and Test Strips and related supplies as covered by insurance.  QS for 90 days check qd and prn, Disp: 150 Each, Rfl: 3  •  metFORMIN (GLUCOPHAGE) 500 MG Tab, Take 1 Tab by mouth 2 times a day, with meals., Disp: 180 Tab, Rfl: 3  •  nystatin-triamcinalone (MYCOLOG) 854509-6.1 UNIT/GM-% Ointment, Apply 1 Units to affected area(s) 2 times a day., Disp: 1 Tube, Rfl: 0  •  cetirizine (ZYRTEC) 10 MG Tab, Take 1 Tab by mouth every day., Disp: 30 Tab, Rfl: 0  •  fluticasone (FLONASE) 50 MCG/ACT nasal spray, Spray 2 Sprays in nose every day., Disp: 16 g, Rfl: 0  •  magnesium oxide (MAG-OX) 400 MG Tab, Take 400 mg by mouth every day., Disp: , Rfl:   •  calcium carbonate (OS-JUVENAL 500) 500 MG Tab, Take 1,000 mg by mouth 2 times a day, with meals., Disp: , Rfl:   •  Cyanocobalamin (VITAMIN B-12) 3000 MCG SL Tab, Place  under tongue., Disp: , Rfl:   •  vitamin D (CHOLECALCIFEROL) 1000 UNIT Tab, Take 1,000 Units by mouth every day., Disp: , Rfl:   •  Omega 3-6-9 Fatty Acids (TRIPLE OMEGA-3-6-9) Cap, Take  by mouth., Disp: , Rfl:   •  PARoxetine (PAXIL) 20 MG Tab, Take 1 Tab by mouth every evening., Disp: 90 Tab, Rfl: 3  •  carvedilol (COREG) 6.25 MG Tab, TAKE 1 TABLET BY MOUTH TWICE DAILY WITH MEALS, Disp: 180 Tab, Rfl: 3  •  NON SPECIFIED, Nocturnal desaturation study., Disp: 1 Each, Rfl: 0  •  multivitamin (THERAGRAN) Tab, Take 1 Tab by mouth every day., Disp: , Rfl:   •  vitamin D, Ergocalciferol, (DRISDOL) 14802 UNIT CAPS capsule, Take 1,000 Units by mouth every evening., Disp: , Rfl:       Allergies:  Ceclor  "[cefaclor]; Cinnamon; Eggs; Latex; Peppers [pepper-bell food allergy]; and Pineapple    ROS  Review of Systems   Constitutional: Positive for malaise/fatigue. Negative for fever and diaphoresis.   HENT: Positive for congestion and sore throat. Negative for ear discharge, hearing loss, nosebleeds and tinnitus.    Eyes: Negative for blurred vision, double vision and photophobia.   Respiratory: Positive for cough, sputum production, shortness of breath and wheezing. Negative for hemoptysis.    Cardiovascular: Negative for chest pain and palpitations.   Gastrointestinal: Negative for nausea, vomiting, abdominal pain, diarrhea and constipation.   Genitourinary: Negative for dysuria and flank pain.   Musculoskeletal: Negative for joint pain and myalgias.   Skin: Negative for itching and rash.   Neurological:  Negative for dizziness, tingling and weakness.   Endo/Heme/Allergies: Does not bruise/bleed easily.   Psychiatric/Behavioral: Negative for depression. The patient is not nervous/anxious.             Objective:     /82   Pulse 62   Temp 37.1 °C (98.7 °F) (Temporal)   Resp 16   Ht 1.626 m (5' 4\")   Wt (!) 132 kg (291 lb)   LMP 03/01/2016   SpO2 94%   BMI 49.95 kg/m²      Physical Exam       Physical Exam   Constitutional: PT is oriented to person, place, and time. PT appears well-developed and well-nourished. No distress.   HENT:   Head: Normocephalic and atraumatic.   Right Ear: Hearing, tympanic membrane, external ear and ear canal normal.   Left Ear: Hearing, tympanic membrane, external ear and ear canal normal.   Nose: Mucosal edema, rhinorrhea and sinus tenderness present. Right sinus exhibits frontal sinus tenderness. Left sinus exhibits frontal sinus tenderness.   Mouth/Throat: Uvula is midline. Mucous membranes are pale. Posterior oropharyngeal edema and posterior oropharyngeal erythema present. No oropharyngeal exudate.   Eyes: Conjunctivae normal and EOM are normal. Pupils are equal, round, and " reactive to light. Right eye exhibits no discharge. Left eye exhibits no discharge.   Neck: Normal range of motion. Neck supple. No thyromegaly present.   Cardiovascular: Normal rate, regular rhythm, normal heart sounds and intact distal pulses.  Exam reveals no gallop and no friction rub.    No murmur heard.  Pulmonary/Chest: Effort normal. No respiratory distress. PT has wheezes. PT has no rales. PT exhibits tenderness.   Abdominal: Soft. Bowel sounds are normal. PT exhibits no distension and no mass. There is no tenderness. There is no rebound and no guarding.   Musculoskeletal: Normal range of motion. PT exhibits no edema and no tenderness.   Lymphadenopathy:     PT has no cervical adenopathy.   Neurological: Pt is alert and oriented to person, place, and time. Pt has normal reflexes. No cranial nerve deficit.   Skin: Skin is warm and dry. No rash noted. No erythema.   Psychiatric: PT has a normal mood and affect. Pt behavior is normal. Judgment and thought content normal.        Assessment/Plan:       1. Bronchitis    - doxycycline (VIBRAMYCIN) 100 MG Tab; Take 1 Tab by mouth 2 times a day.  Dispense: 14 Tab; Refill: 0  - methylPREDNISolone (MEDROL DOSEPAK) 4 MG Tablet Therapy Pack; Follow schedule on package instructions.  Dispense: 21 Tab; Refill: 0    2. URI with cough and congestion    - methylPREDNISolone (MEDROL DOSEPAK) 4 MG Tablet Therapy Pack; Follow schedule on package instructions.  Dispense: 21 Tab; Refill: 0  - promethazine-codeine (PHENERGAN-CODEINE) 6.25-10 MG/5ML Syrup; Take 5 mL by mouth 4 times a day as needed for Cough for up to 7 days.  Dispense: 120 mL; Refill: 0      Concern for worsening symptoms of URI which shortly could transition to pneumonia and worsening sinus congestion and infection with powerful cough keeping pt up at night as they must sleep upright to avoid coughing fits.  Diff DX: Bronchitis, Sinusitis, Pneumonia, Influenza, Viral URI, Allergies  Rest, fluids encouraged.  OTC  decongestant for congestion/cough  Note given for work.  AVS with medical info given.  Pt was in full understanding and agreement with the plan.  Differential diagnosis, natural history, supportive care, and indications for immediate follow-up discussed. All questions answered. Patient agrees with the plan of care.  Follow-up as needed if symptoms worsen or fail to improve to PCP, Urgent care or Emergency Room.

## 2020-02-21 ENCOUNTER — OFFICE VISIT (OUTPATIENT)
Dept: MEDICAL GROUP | Facility: LAB | Age: 50
End: 2020-02-21
Payer: COMMERCIAL

## 2020-02-21 ENCOUNTER — HOSPITAL ENCOUNTER (OUTPATIENT)
Dept: LAB | Facility: MEDICAL CENTER | Age: 50
End: 2020-02-21
Attending: INTERNAL MEDICINE
Payer: COMMERCIAL

## 2020-02-21 VITALS
RESPIRATION RATE: 12 BRPM | HEART RATE: 72 BPM | OXYGEN SATURATION: 96 % | SYSTOLIC BLOOD PRESSURE: 138 MMHG | BODY MASS INDEX: 47.58 KG/M2 | WEIGHT: 285.6 LBS | TEMPERATURE: 97.5 F | HEIGHT: 65 IN | DIASTOLIC BLOOD PRESSURE: 88 MMHG

## 2020-02-21 DIAGNOSIS — E11.9 CONTROLLED TYPE 2 DIABETES MELLITUS WITHOUT COMPLICATION, WITHOUT LONG-TERM CURRENT USE OF INSULIN (HCC): ICD-10-CM

## 2020-02-21 DIAGNOSIS — E66.01 MORBID OBESITY (HCC): ICD-10-CM

## 2020-02-21 LAB
EST. AVERAGE GLUCOSE BLD GHB EST-MCNC: 177 MG/DL
HBA1C MFR BLD: 7.8 % (ref 0–5.6)

## 2020-02-21 PROCEDURE — 83036 HEMOGLOBIN GLYCOSYLATED A1C: CPT

## 2020-02-21 PROCEDURE — 99213 OFFICE O/P EST LOW 20 MIN: CPT | Performed by: INTERNAL MEDICINE

## 2020-02-21 PROCEDURE — 36415 COLL VENOUS BLD VENIPUNCTURE: CPT

## 2020-02-21 ASSESSMENT — PATIENT HEALTH QUESTIONNAIRE - PHQ9: CLINICAL INTERPRETATION OF PHQ2 SCORE: 0

## 2020-02-21 NOTE — PROGRESS NOTES
CC: Logan Mahoney is a 50 y.o. female is suffering from   Chief Complaint   Patient presents with   • Diabetes     3 months follow up         SUBJECTIVE:  1. Controlled type 2 diabetes mellitus without complication, without long-term current use of insulin (HCC)  Lakesha is here for follow-up has a history of type 2 diabetes with adequate control    2. Morbid obesity (HCC)  Patient with severe morbid obesity, discussed the importance of being seen by weight loss physician referral written        Past social, family, history: , schoolteacher  Social History     Tobacco Use   • Smoking status: Never Smoker   • Smokeless tobacco: Never Used   Substance Use Topics   • Alcohol use: No   • Drug use: No         MEDICATIONS:    Current Outpatient Medications:   •  doxycycline (VIBRAMYCIN) 100 MG Tab, Take 1 Tab by mouth 2 times a day., Disp: 14 Tab, Rfl: 0  •  promethazine-codeine (PHENERGAN-CODEINE) 6.25-10 MG/5ML Syrup, Take 5 mL by mouth 4 times a day as needed for Cough for up to 7 days., Disp: 120 mL, Rfl: 0  •  JARDIANCE 10 MG Tab, TK 1 T PO QD, Disp: , Rfl: 3  •  ALBUTEROL SULFATE HFA INH, INHALE 2 PUFFS PO Q 6 H PRN FOR SOB, Disp: , Rfl: 0  •  acyclovir (ZOVIRAX) 400 MG tablet, TK ONE T PO TID WF AND FULL GLASS OF WATER FOR 7 DAYS, Disp: , Rfl: 1  •  metFORMIN (GLUCOPHAGE) 500 MG Tab, Take 1 Tab by mouth 2 times a day, with meals., Disp: 180 Tab, Rfl: 3  •  nystatin-triamcinalone (MYCOLOG) 768838-2.1 UNIT/GM-% Ointment, Apply 1 Units to affected area(s) 2 times a day., Disp: 1 Tube, Rfl: 0  •  cetirizine (ZYRTEC) 10 MG Tab, Take 1 Tab by mouth every day., Disp: 30 Tab, Rfl: 0  •  fluticasone (FLONASE) 50 MCG/ACT nasal spray, Spray 2 Sprays in nose every day., Disp: 16 g, Rfl: 0  •  magnesium oxide (MAG-OX) 400 MG Tab, Take 400 mg by mouth every day., Disp: , Rfl:   •  calcium carbonate (OS-JUVENAL 500) 500 MG Tab, Take 1,000 mg by mouth 2 times a day, with meals., Disp: , Rfl:   •  Cyanocobalamin (VITAMIN  B-12) 3000 MCG SL Tab, Place  under tongue., Disp: , Rfl:   •  vitamin D (CHOLECALCIFEROL) 1000 UNIT Tab, Take 1,000 Units by mouth every day., Disp: , Rfl:   •  Omega 3-6-9 Fatty Acids (TRIPLE OMEGA-3-6-9) Cap, Take  by mouth., Disp: , Rfl:   •  PARoxetine (PAXIL) 20 MG Tab, Take 1 Tab by mouth every evening., Disp: 90 Tab, Rfl: 3  •  carvedilol (COREG) 6.25 MG Tab, TAKE 1 TABLET BY MOUTH TWICE DAILY WITH MEALS, Disp: 180 Tab, Rfl: 3  •  multivitamin (THERAGRAN) Tab, Take 1 Tab by mouth every day., Disp: , Rfl:   •  methylPREDNISolone (MEDROL DOSEPAK) 4 MG Tablet Therapy Pack, Follow schedule on package instructions. (Patient not taking: Reported on 2/21/2020), Disp: 21 Tab, Rfl: 0  •  Blood Glucose Monitoring Suppl (ONE TOUCH ULTRA 2) w/Device Kit, USE FOR BLOOD SUGAR TEST UTD, Disp: , Rfl: 0  •  ONE TOUCH ULTRA TEST strip, USE ONE STRIP FOR BLOOD SUGAR TEST D AND PRN, Disp: , Rfl: 3  •  ONETOUCH DELICA LANCETS 33G Misc, USE ONE LANCET FOR BLOOD SUGAR TEST D AND PRN, Disp: , Rfl: 3  •  PARoxetine (PAXIL) 20 MG Tab, TAKE 1 TABLET BY MOUTH EVERY DAY, Disp: 90 Tab, Rfl: 4  •  NON SPECIFIED, Glucometer and Test Strips and related supplies as covered by insurance.  QS for 90 days check qd and prn, Disp: 150 Each, Rfl: 3  •  NON SPECIFIED, Nocturnal desaturation study., Disp: 1 Each, Rfl: 0  •  vitamin D, Ergocalciferol, (DRISDOL) 88371 UNIT CAPS capsule, Take 1,000 Units by mouth every evening., Disp: , Rfl:     PROBLEMS:  Patient Active Problem List    Diagnosis Date Noted   • Preop cardiovascular exam 07/20/2012     Priority: High   • Nonspecific abnormal electrocardiogram (ECG) (EKG) 07/20/2012     Priority: High   • Benign hypertension 07/20/2012     Priority: Medium   • DM II (diabetes mellitus, type II), controlled ([facility]) 06/12/2019   • Vitamin D deficiency disease 12/02/2016   • Morbid obesity (HCC) 05/31/2016   • S/P gastric surgery 04/18/2016       REVIEW OF SYSTEMS:  Gen.:  No Nausea, Vomiting, fever,  "Chills.  Heart: No chest pain.  Lungs:  No shortness of Breath.  Psychological: Rinku unusual Anxiety depression     PHYSICAL EXAM   Constitutional: Alert, cooperative, not in acute distress.  Cardiovascular:  Rate Rhythm is regular without murmurs rubs clicks.     Thorax & Lungs: Clear to auscultation, no wheezing, rhonchi, or rales  HENT: Normocephalic, Atraumatic.  Eyes: PERRLA, EOMI, Conjunctiva normal.   Neck: Trachia is midline no swelling of the thyroid.   Lymphatic: No lymphadenopathy noted.   Neurologic: Alert & oriented x 3, cranial nerves II through XII are intact, Normal motor function, Normal sensory function, No focal deficits noted.   Psychiatric: Affect normal, Judgment normal, Mood normal.     VITAL SIGNS:/88   Pulse 72   Temp 36.4 °C (97.5 °F) (Temporal)   Resp 12   Ht 1.638 m (5' 4.5\")   Wt (!) 129.5 kg (285 lb 9.6 oz)   LMP 03/01/2016   SpO2 96%   BMI 48.27 kg/m²     Labs: Reviewed    Assessment:                                                     Plan:    1. Controlled type 2 diabetes mellitus without complication, without long-term current use of insulin (HCC)  Recheck hemoglobin A1c today also in 3 months  - HEMOGLOBIN A1C; Future  - HEMOGLOBIN A1C; Future    2. Morbid obesity (HCC)  Referral written  - REFERRAL TO [facility] IMPROVEMENT PROGRAMS (HIP) Services Requested: Physician Medical Weight Management Program; Reason for Referral? BMI>30; Reason for Visit: Overweight/Obesity        "

## 2020-02-21 NOTE — LETTER
[employer]  Manuel Barbosa D.O.  [address]  [place] [address]  Fax: [phone]   Authorization for Release/Disclosure of   Protected Health Information   Name: GABE WARREN : 1970 SSN: [id number]   Address:  [address] [place] [address] Phone:    [phone] (home)    I authorize the entity listed below to release/disclose the PHI below to:   [employer]/Manuel Barbosa D.O. and Manuel Barbosa D.O.   Provider or Entity Name:  [facility]   Address   City, State, [facility]  [address] [place].  Phone:      Fax:     Reason for request: continuity of care   Information to be released:    [  ] LAST COLONOSCOPY,  including any PATH REPORT and follow-up  [  ] LAST FIT/COLOGUARD RESULT [  ] LAST DEXA  [  ] LAST MAMMOGRAM  [  ] LAST PAP  [  ] LAST LABS [x  ] RETINA EXAM REPORT  [  ] IMMUNIZATION RECORDS  [  ] Release all info      [  ] Check here and initial the line next to each item to release ALL health information INCLUDING  _____ Care and treatment for drug and / or alcohol abuse  _____ HIV testing, infection status, or AIDS  _____ Genetic Testing    DATES OF SERVICE OR TIME PERIOD TO BE DISCLOSED: _____________  I understand and acknowledge that:  * This Authorization may be revoked at any time by you in writing, except if your health information has already been used or disclosed.  * Your health information that will be used or disclosed as a result of you signing this authorization could be re-disclosed by the recipient. If this occurs, your re-disclosed health information may no longer be protected by State or Federal laws.  * You may refuse to sign this Authorization. Your refusal will not affect your ability to obtain treatment.  * This Authorization becomes effective upon signing and will  on (date) __________.      If no date is indicated, this Authorization will  one (1) year from the signature date.    Name: Gabe Mandel  Denzel    Signature:   Date:     2/21/2020       PLEASE FAX REQUESTED RECORDS BACK TO: [phone]

## 2020-04-01 ENCOUNTER — APPOINTMENT (RX ONLY)
Dept: URBAN - METROPOLITAN AREA CLINIC 22 | Facility: CLINIC | Age: 50
Setting detail: DERMATOLOGY
End: 2020-04-01

## 2020-04-01 DIAGNOSIS — L82.0 INFLAMED SEBORRHEIC KERATOSIS: ICD-10-CM

## 2020-04-01 DIAGNOSIS — D22 MELANOCYTIC NEVI: ICD-10-CM

## 2020-04-01 DIAGNOSIS — Z85.828 PERSONAL HISTORY OF OTHER MALIGNANT NEOPLASM OF SKIN: ICD-10-CM

## 2020-04-01 DIAGNOSIS — L30.4 ERYTHEMA INTERTRIGO: ICD-10-CM

## 2020-04-01 DIAGNOSIS — L81.4 OTHER MELANIN HYPERPIGMENTATION: ICD-10-CM

## 2020-04-01 DIAGNOSIS — L82.1 OTHER SEBORRHEIC KERATOSIS: ICD-10-CM

## 2020-04-01 DIAGNOSIS — Z71.89 OTHER SPECIFIED COUNSELING: ICD-10-CM

## 2020-04-01 DIAGNOSIS — D18.0 HEMANGIOMA: ICD-10-CM

## 2020-04-01 PROBLEM — D22.62 MELANOCYTIC NEVI OF LEFT UPPER LIMB, INCLUDING SHOULDER: Status: ACTIVE | Noted: 2020-04-01

## 2020-04-01 PROBLEM — D22.5 MELANOCYTIC NEVI OF TRUNK: Status: ACTIVE | Noted: 2020-04-01

## 2020-04-01 PROBLEM — D22.61 MELANOCYTIC NEVI OF RIGHT UPPER LIMB, INCLUDING SHOULDER: Status: ACTIVE | Noted: 2020-04-01

## 2020-04-01 PROBLEM — D18.01 HEMANGIOMA OF SKIN AND SUBCUTANEOUS TISSUE: Status: ACTIVE | Noted: 2020-04-01

## 2020-04-01 PROCEDURE — ? COUNSELING

## 2020-04-01 PROCEDURE — ? PRESCRIPTION

## 2020-04-01 PROCEDURE — 99214 OFFICE O/P EST MOD 30 MIN: CPT

## 2020-04-01 RX ORDER — NYSTATIN 100000 [USP'U]/G
1 CREAM TOPICAL TID
Qty: 1 | Refills: 1 | Status: ERX

## 2020-04-01 ASSESSMENT — LOCATION DETAILED DESCRIPTION DERM
LOCATION DETAILED: RIGHT MEDIAL UPPER BACK
LOCATION DETAILED: RIGHT SUPERIOR MEDIAL MIDBACK
LOCATION DETAILED: LEFT MEDIAL BREAST 7-8:00 REGION
LOCATION DETAILED: INFERIOR THORACIC SPINE
LOCATION DETAILED: EPIGASTRIC SKIN
LOCATION DETAILED: RIGHT PROXIMAL DORSAL FOREARM
LOCATION DETAILED: SUBXIPHOID
LOCATION DETAILED: NASAL ROOT
LOCATION DETAILED: LEFT DISTAL DORSAL FOREARM
LOCATION DETAILED: RIGHT SUPERIOR UPPER BACK
LOCATION DETAILED: LEFT PROXIMAL DORSAL FOREARM

## 2020-04-01 ASSESSMENT — LOCATION ZONE DERM
LOCATION ZONE: NOSE
LOCATION ZONE: TRUNK
LOCATION ZONE: ARM

## 2020-04-01 ASSESSMENT — LOCATION SIMPLE DESCRIPTION DERM
LOCATION SIMPLE: RIGHT FOREARM
LOCATION SIMPLE: LEFT FOREARM
LOCATION SIMPLE: RIGHT UPPER BACK
LOCATION SIMPLE: NOSE
LOCATION SIMPLE: UPPER BACK
LOCATION SIMPLE: RIGHT LOWER BACK
LOCATION SIMPLE: LEFT BREAST
LOCATION SIMPLE: ABDOMEN

## 2020-05-13 ENCOUNTER — TELEPHONE (OUTPATIENT)
Dept: MEDICAL GROUP | Facility: LAB | Age: 50
End: 2020-05-13

## 2020-05-13 ENCOUNTER — PATIENT MESSAGE (OUTPATIENT)
Dept: MEDICAL GROUP | Facility: LAB | Age: 50
End: 2020-05-13

## 2020-05-13 NOTE — TELEPHONE ENCOUNTER
1. Caller Name: Logan                         Call Back Number: [phone] (home)        How would the patient prefer to be contacted with a response: Phone call OK to leave a detailed message    You referred pt for weight management.  She is not wanting to make appt's due to the pandemic.  She says that you offered to start her off with weight management for 3 months.  She is wondering if you'd do that.

## 2020-05-22 ENCOUNTER — TELEMEDICINE (OUTPATIENT)
Dept: MEDICAL GROUP | Facility: LAB | Age: 50
End: 2020-05-22
Payer: COMMERCIAL

## 2020-05-22 VITALS — BODY MASS INDEX: 48.82 KG/M2 | HEIGHT: 65 IN | WEIGHT: 293 LBS

## 2020-05-22 DIAGNOSIS — E66.01 MORBID OBESITY (HCC): ICD-10-CM

## 2020-05-22 DIAGNOSIS — E11.9 CONTROLLED TYPE 2 DIABETES MELLITUS WITHOUT COMPLICATION, WITHOUT LONG-TERM CURRENT USE OF INSULIN (HCC): ICD-10-CM

## 2020-05-22 PROCEDURE — 99442 PR PHYSICIAN TELEPHONE EVALUATION 11-20 MIN: CPT | Mod: CR | Performed by: INTERNAL MEDICINE

## 2020-05-22 ASSESSMENT — FIBROSIS 4 INDEX: FIB4 SCORE: 0.89

## 2020-05-22 NOTE — PROGRESS NOTES
Telephone Appointment Visit   As a means of avoiding spread of COVID-19, this visit is being conducted by telephone. This telephone visit was initiated by the patient and they verbally consented.    Time at start of call: 9:10    Reason for Call:  Symptom Follow-up    Patient Comments / History:   Type II DM, morbid obesity    Labs / Images Reviewed   1. Controlled type 2 diabetes mellitus without complication, without long-term current use of insulin (HCC)  Patient is unable to get lab work done because of COVID-19, we have discussed that I would like it done next couple of months    2. Morbid obesity (HCC)  Patient is interested in undergoing a weight loss program referral is been made to health improvement program Dr. Marie.  - REFERRAL TO [facility] IMPROVEMENT PROGRAMS (HIP) Services Requested: Physician Medical Weight Management Program; Reason for Referral? BMI>30; Reason for Visit: Overweight/Obesity       Assessment and Plan:     1. Controlled type 2 diabetes mellitus without complication, without long-term current use of insulin (HCC)    2. Morbid obesity (HCC)      Follow-up: No follow-ups on file.    Time at end of call: 9:26  Total Time Spent: 11-20 minutes    Manuel Barbosa D.O.

## 2020-05-28 ENCOUNTER — TELEPHONE (OUTPATIENT)
Dept: MEDICAL GROUP | Facility: LAB | Age: 50
End: 2020-05-28

## 2020-05-28 DIAGNOSIS — E66.01 MORBID OBESITY (HCC): ICD-10-CM

## 2020-05-28 RX ORDER — PHENTERMINE HYDROCHLORIDE 37.5 MG/1
37.5 CAPSULE ORAL EVERY MORNING
Qty: 30 CAP | Refills: 0 | Status: SHIPPED | OUTPATIENT
Start: 2020-05-28 | End: 2020-06-25 | Stop reason: SDUPTHER

## 2020-05-28 NOTE — TELEPHONE ENCOUNTER
1. Caller Name: Logan Erikson                        Call Back Number: [phone]      How would the patient prefer to be contacted with a response: Phone call do NOT leave a detailed message    Patient called and left a VM stating that she had a Telemedicine appointment with you earlier this month and you had stated you would call in a medication for weight loss. I looked at the most recent Telemedicine encounter on 05/22/2020 and saw that a referral to [facility] was made but I did not see anything mentioned about a medication.     Please advise.

## 2020-05-29 NOTE — TELEPHONE ENCOUNTER
Telephone call to the patient, referral is been written to weight loss clinic.  Patient and I have discussed tonight the use of phentermine, patient understands it must be stopped should she experience any cardiac arrhythmia was warned about pulmonary hypertension.  State drug task force urine drug screen were all reviewed.  Patient appears to be stable understands proper usage of medication is to follow-up with me in 1 month.

## 2020-06-08 DIAGNOSIS — I10 ESSENTIAL HYPERTENSION: ICD-10-CM

## 2020-06-08 RX ORDER — CARVEDILOL 6.25 MG/1
TABLET ORAL
Qty: 180 TAB | Refills: 3 | Status: SHIPPED | OUTPATIENT
Start: 2020-06-08 | End: 2021-06-07

## 2020-06-08 NOTE — TELEPHONE ENCOUNTER
Received request via: Patient    Was the patient seen in the last year in this department? Yes  2/21/2020  Does the patient have an active prescription (recently filled or refills available) for medication(s) requested? No

## 2020-06-15 RX ORDER — PAROXETINE HYDROCHLORIDE 20 MG/1
TABLET, FILM COATED ORAL
Qty: 90 TAB | Refills: 4 | Status: SHIPPED | OUTPATIENT
Start: 2020-06-15 | End: 2021-09-13

## 2020-06-15 NOTE — TELEPHONE ENCOUNTER
Received request via: Pharmacy    Was the patient seen in the last year in this department? Yes  2/21/20  Does the patient have an active prescription (recently filled or refills available) for medication(s) requested? No

## 2020-06-16 RX ORDER — EMPAGLIFLOZIN 10 MG/1
TABLET, FILM COATED ORAL
Qty: 90 TAB | Refills: 1 | Status: SHIPPED | OUTPATIENT
Start: 2020-06-16 | End: 2020-06-25

## 2020-06-16 NOTE — TELEPHONE ENCOUNTER
Received request via: Pharmacy    Was the patient seen in the last year in this department? Yes  2/21//20  Does the patient have an active prescription (recently filled or refills available) for medication(s) requested? No

## 2020-06-22 DIAGNOSIS — E11.8 CONTROLLED TYPE 2 DIABETES MELLITUS WITH COMPLICATION, WITHOUT LONG-TERM CURRENT USE OF INSULIN (HCC): ICD-10-CM

## 2020-06-25 ENCOUNTER — OFFICE VISIT (OUTPATIENT)
Dept: MEDICAL GROUP | Facility: LAB | Age: 50
End: 2020-06-25
Payer: COMMERCIAL

## 2020-06-25 VITALS
TEMPERATURE: 97.5 F | DIASTOLIC BLOOD PRESSURE: 90 MMHG | RESPIRATION RATE: 12 BRPM | HEART RATE: 92 BPM | SYSTOLIC BLOOD PRESSURE: 140 MMHG | OXYGEN SATURATION: 93 % | WEIGHT: 293 LBS | BODY MASS INDEX: 48.82 KG/M2 | HEIGHT: 65 IN

## 2020-06-25 DIAGNOSIS — E66.01 MORBID OBESITY (HCC): ICD-10-CM

## 2020-06-25 DIAGNOSIS — E11.9 CONTROLLED TYPE 2 DIABETES MELLITUS WITHOUT COMPLICATION, WITHOUT LONG-TERM CURRENT USE OF INSULIN (HCC): ICD-10-CM

## 2020-06-25 DIAGNOSIS — Z20.9 INFECTIOUS DISEASE EXPOSURE: ICD-10-CM

## 2020-06-25 PROCEDURE — 99214 OFFICE O/P EST MOD 30 MIN: CPT | Performed by: INTERNAL MEDICINE

## 2020-06-25 RX ORDER — PIOGLITAZONEHYDROCHLORIDE 15 MG/1
15 TABLET ORAL DAILY
Qty: 30 TAB | Refills: 11 | Status: SHIPPED | OUTPATIENT
Start: 2020-06-25 | End: 2020-06-25

## 2020-06-25 RX ORDER — PIOGLITAZONEHYDROCHLORIDE 15 MG/1
15 TABLET ORAL DAILY
Qty: 30 TAB | Refills: 11 | Status: SHIPPED | OUTPATIENT
Start: 2020-06-25 | End: 2021-07-06

## 2020-06-25 RX ORDER — PHENTERMINE HYDROCHLORIDE 37.5 MG/1
37.5 CAPSULE ORAL EVERY MORNING
Qty: 30 CAP | Refills: 0 | Status: SHIPPED | OUTPATIENT
Start: 2020-06-27 | End: 2020-11-18

## 2020-06-25 ASSESSMENT — FIBROSIS 4 INDEX: FIB4 SCORE: 0.89

## 2020-06-26 NOTE — PROGRESS NOTES
CC: Logan Mahoney is a 50 y.o. female is suffering from   Chief Complaint   Patient presents with   • Diabetes     4 months follow up   • Obesity     4 months follow up         SUBJECTIVE:  1. Morbid obesity (HCC)  Lakesha is here for follow-up suffers morbid obesity is to continue on phentermine    2. Controlled type 2 diabetes mellitus without complication, without long-term current use of insulin (HCC)  Clinically stable is having side effects associate with the use of Jardiance will change to Actos    3. Infectious disease exposure  Patient and I have discussed possible exposure to COVID-19 antibodies ordered        Past social, family, history: , special   Social History     Tobacco Use   • Smoking status: Never Smoker   • Smokeless tobacco: Never Used   Substance Use Topics   • Alcohol use: No   • Drug use: No         MEDICATIONS:    Current Outpatient Medications:   •  pioglitazone (ACTOS) 15 MG Tab, Take 1 Tab by mouth every day., Disp: 30 Tab, Rfl: 11  •  [START ON 6/27/2020] phentermine 37.5 MG capsule, Take 1 Cap by mouth every morning for 30 days., Disp: 30 Cap, Rfl: 0  •  metFORMIN (GLUCOPHAGE) 500 MG Tab, TAKE 1 TABLET BY MOUTH TWICE DAILY WITH MEALS, Disp: 180 Tab, Rfl: 3  •  PARoxetine (PAXIL) 20 MG Tab, TAKE 1 TABLET BY MOUTH EVERY DAY, Disp: 90 Tab, Rfl: 4  •  carvedilol (COREG) 6.25 MG Tab, TAKE 1 TABLET BY MOUTH TWICE DAILY WITH MEALS, Disp: 180 Tab, Rfl: 3  •  ALBUTEROL SULFATE HFA INH, INHALE 2 PUFFS PO Q 6 H PRN FOR SOB, Disp: , Rfl: 0  •  calcium carbonate (OS-JUVENAL 500) 500 MG Tab, Take 1,000 mg by mouth 2 times a day, with meals., Disp: , Rfl:   •  Cyanocobalamin (VITAMIN B-12) 3000 MCG SL Tab, Place  under tongue., Disp: , Rfl:   •  vitamin D (CHOLECALCIFEROL) 1000 UNIT Tab, Take 1,000 Units by mouth every day., Disp: , Rfl:   •  Omega 3-6-9 Fatty Acids (TRIPLE OMEGA-3-6-9) Cap, Take  by mouth., Disp: , Rfl:   •  PARoxetine (PAXIL) 20 MG Tab, Take 1 Tab by mouth every  evening., Disp: 90 Tab, Rfl: 3  •  multivitamin (THERAGRAN) Tab, Take 1 Tab by mouth every day., Disp: , Rfl:   •  doxycycline (VIBRAMYCIN) 100 MG Tab, Take 1 Tab by mouth 2 times a day. (Patient not taking: Reported on 5/22/2020), Disp: 14 Tab, Rfl: 0  •  methylPREDNISolone (MEDROL DOSEPAK) 4 MG Tablet Therapy Pack, Follow schedule on package instructions. (Patient not taking: Reported on 2/21/2020), Disp: 21 Tab, Rfl: 0  •  Blood Glucose Monitoring Suppl (ONE TOUCH ULTRA 2) w/Device Kit, USE FOR BLOOD SUGAR TEST UTD, Disp: , Rfl: 0  •  ONE TOUCH ULTRA TEST strip, USE ONE STRIP FOR BLOOD SUGAR TEST D AND PRN, Disp: , Rfl: 3  •  ONETOUCH DELICA LANCETS 33G Misc, USE ONE LANCET FOR BLOOD SUGAR TEST D AND PRN, Disp: , Rfl: 3  •  acyclovir (ZOVIRAX) 400 MG tablet, TK ONE T PO TID WF AND FULL GLASS OF WATER FOR 7 DAYS, Disp: , Rfl: 1  •  NON SPECIFIED, Glucometer and Test Strips and related supplies as covered by insurance.  QS for 90 days check qd and prn, Disp: 150 Each, Rfl: 3  •  nystatin-triamcinalone (MYCOLOG) 741039-6.1 UNIT/GM-% Ointment, Apply 1 Units to affected area(s) 2 times a day., Disp: 1 Tube, Rfl: 0  •  cetirizine (ZYRTEC) 10 MG Tab, Take 1 Tab by mouth every day. (Patient not taking: Reported on 6/25/2020), Disp: 30 Tab, Rfl: 0  •  fluticasone (FLONASE) 50 MCG/ACT nasal spray, Spray 2 Sprays in nose every day. (Patient not taking: Reported on 6/25/2020), Disp: 16 g, Rfl: 0  •  magnesium oxide (MAG-OX) 400 MG Tab, Take 400 mg by mouth every day., Disp: , Rfl:   •  NON SPECIFIED, Nocturnal desaturation study., Disp: 1 Each, Rfl: 0  •  vitamin D, Ergocalciferol, (DRISDOL) 99421 UNIT CAPS capsule, Take 1,000 Units by mouth every evening., Disp: , Rfl:     PROBLEMS:  Patient Active Problem List    Diagnosis Date Noted   • Preop cardiovascular exam 07/20/2012     Priority: High   • Nonspecific abnormal electrocardiogram (ECG) (EKG) 07/20/2012     Priority: High   • Benign hypertension 07/20/2012      "Priority: Medium   • DM II (diabetes mellitus, type II), controlled ([facility]) 06/12/2019   • Vitamin D deficiency disease 12/02/2016   • Morbid obesity ([facility]) 05/31/2016   • S/P gastric surgery 04/18/2016       REVIEW OF SYSTEMS:  Gen.:  No Nausea, Vomiting, fever, Chills.  Heart: No chest pain.  Lungs:  No shortness of Breath.  Psychological: Rinku unusual Anxiety depression     PHYSICAL EXAM   Constitutional: Alert, cooperative, not in acute distress.  Cardiovascular:  Rate Rhythm is regular without murmurs rubs clicks.     Thorax & Lungs: Clear to auscultation, no wheezing, rhonchi, or rales  HENT: Normocephalic, Atraumatic.  Eyes: PERRLA, EOMI, Conjunctiva normal.   Neck: Trachia is midline no swelling of the thyroid.   Lymphatic: No lymphadenopathy noted.   Neurologic: Alert & oriented x 3, cranial nerves II through XII are intact, Normal motor function, Normal sensory function, No focal deficits noted.   Psychiatric: Affect normal, Judgment normal, Mood normal.     VITAL SIGNS:/90   Pulse 92   Temp 36.4 °C (97.5 °F) (Temporal)   Resp 12   Ht 1.638 m (5' 4.5\")   Wt (!) 133.4 kg (294 lb)   LMP 03/01/2016   SpO2 93%   BMI 49.69 kg/m²     Labs: Reviewed    Assessment:                                                     Plan:    1. Morbid obesity (HCC)  Continue phentermine  - phentermine 37.5 MG capsule; Take 1 Cap by mouth every morning for 30 days.  Dispense: 30 Cap; Refill: 0    2. Controlled type 2 diabetes mellitus without complication, without long-term current use of insulin ([facility])  Stop Jardiance start Actos  - pioglitazone (ACTOS) 15 MG Tab; Take 1 Tab by mouth every day.  Dispense: 30 Tab; Refill: 11    3. Infectious disease exposure  Check COVID 2 antibody  - SARS CoV-2 Ab, Total; Future        "

## 2020-07-30 ENCOUNTER — APPOINTMENT (RX ONLY)
Dept: URBAN - METROPOLITAN AREA CLINIC 22 | Facility: CLINIC | Age: 50
Setting detail: DERMATOLOGY
End: 2020-07-30

## 2020-07-30 DIAGNOSIS — L82.0 INFLAMED SEBORRHEIC KERATOSIS: ICD-10-CM

## 2020-07-30 DIAGNOSIS — Z85.828 PERSONAL HISTORY OF OTHER MALIGNANT NEOPLASM OF SKIN: ICD-10-CM

## 2020-07-30 DIAGNOSIS — Z71.89 OTHER SPECIFIED COUNSELING: ICD-10-CM

## 2020-07-30 DIAGNOSIS — L81.4 OTHER MELANIN HYPERPIGMENTATION: ICD-10-CM

## 2020-07-30 DIAGNOSIS — Q819 OTHER SPECIFIED ANOMALIES OF SKIN: ICD-10-CM | Status: IMPROVED

## 2020-07-30 DIAGNOSIS — Q828 OTHER SPECIFIED ANOMALIES OF SKIN: ICD-10-CM | Status: IMPROVED

## 2020-07-30 DIAGNOSIS — Q826 OTHER SPECIFIED ANOMALIES OF SKIN: ICD-10-CM | Status: IMPROVED

## 2020-07-30 DIAGNOSIS — L73.8 OTHER SPECIFIED FOLLICULAR DISORDERS: ICD-10-CM

## 2020-07-30 PROBLEM — Q82.8 OTHER SPECIFIED CONGENITAL MALFORMATIONS OF SKIN: Status: ACTIVE | Noted: 2020-07-30

## 2020-07-30 PROCEDURE — 99213 OFFICE O/P EST LOW 20 MIN: CPT | Mod: 25

## 2020-07-30 PROCEDURE — ? LIQUID NITROGEN

## 2020-07-30 PROCEDURE — ? COUNSELING

## 2020-07-30 PROCEDURE — 17110 DESTRUCTION B9 LES UP TO 14: CPT

## 2020-07-30 PROCEDURE — ? TREATMENT REGIMEN

## 2020-07-30 ASSESSMENT — LOCATION DETAILED DESCRIPTION DERM
LOCATION DETAILED: LEFT ANTERIOR SHOULDER
LOCATION DETAILED: RIGHT PROXIMAL DORSAL FOREARM
LOCATION DETAILED: RIGHT LATERAL MALAR CHEEK
LOCATION DETAILED: LEFT MEDIAL SUPERIOR CHEST
LOCATION DETAILED: RIGHT ANTERIOR SHOULDER
LOCATION DETAILED: LEFT MEDIAL MALAR CHEEK
LOCATION DETAILED: LEFT PROXIMAL DORSAL FOREARM
LOCATION DETAILED: MIDDLE STERNUM
LOCATION DETAILED: RIGHT INFERIOR CENTRAL MALAR CHEEK
LOCATION DETAILED: UPPER STERNUM
LOCATION DETAILED: NASAL ROOT
LOCATION DETAILED: RIGHT MEDIAL MALAR CHEEK
LOCATION DETAILED: LEFT POSTERIOR SHOULDER
LOCATION DETAILED: RIGHT ANTERIOR PROXIMAL UPPER ARM

## 2020-07-30 ASSESSMENT — LOCATION SIMPLE DESCRIPTION DERM
LOCATION SIMPLE: LEFT CHEEK
LOCATION SIMPLE: LEFT SHOULDER
LOCATION SIMPLE: RIGHT UPPER ARM
LOCATION SIMPLE: RIGHT FOREARM
LOCATION SIMPLE: CHEST
LOCATION SIMPLE: RIGHT CHEEK
LOCATION SIMPLE: RIGHT SHOULDER
LOCATION SIMPLE: LEFT FOREARM
LOCATION SIMPLE: NOSE

## 2020-07-30 ASSESSMENT — LOCATION ZONE DERM
LOCATION ZONE: FACE
LOCATION ZONE: TRUNK
LOCATION ZONE: ARM
LOCATION ZONE: NOSE

## 2020-07-30 NOTE — PROCEDURE: LIQUID NITROGEN
Consent: The patient's consent was obtained including but not limited to risks of crusting, scabbing, blistering, scarring, darker or lighter pigmentary change, recurrence, incomplete removal and infection.
Detail Level: Detailed
Add 52 Modifier (Optional): no
Medical Necessity Clause: This procedure was medically necessary because the lesions that were treated were:
Medical Necessity Information: It is in your best interest to select a reason for this procedure from the list below. All of these items fulfill various CMS LCD requirements except the new and changing color options.
Number Of Freeze-Thaw Cycles: 3 freeze-thaw cycles
Duration Of Freeze Thaw-Cycle (Seconds): 3
Post-Care Instructions: I reviewed with the patient in detail post-care instructions. Patient is to wear sunprotection, and avoid picking at any of the treated lesions. Pt may apply Vaseline to crusted or scabbing areas.

## 2020-08-17 ENCOUNTER — HOSPITAL ENCOUNTER (OUTPATIENT)
Dept: LAB | Facility: MEDICAL CENTER | Age: 50
End: 2020-08-17
Attending: INTERNAL MEDICINE
Payer: COMMERCIAL

## 2020-08-17 DIAGNOSIS — Z20.9 INFECTIOUS DISEASE EXPOSURE: ICD-10-CM

## 2020-08-17 LAB — SARS-COV-2 AB SERPL QL IA: NORMAL

## 2020-08-17 PROCEDURE — 86769 SARS-COV-2 COVID-19 ANTIBODY: CPT

## 2020-08-17 PROCEDURE — 36415 COLL VENOUS BLD VENIPUNCTURE: CPT

## 2020-09-03 ENCOUNTER — PATIENT MESSAGE (OUTPATIENT)
Dept: MEDICAL GROUP | Facility: LAB | Age: 50
End: 2020-09-03

## 2020-09-05 ENCOUNTER — HOSPITAL ENCOUNTER (OUTPATIENT)
Dept: LAB | Facility: MEDICAL CENTER | Age: 50
End: 2020-09-05
Attending: INTERNAL MEDICINE
Payer: COMMERCIAL

## 2020-09-05 DIAGNOSIS — E11.9 CONTROLLED TYPE 2 DIABETES MELLITUS WITHOUT COMPLICATION, WITHOUT LONG-TERM CURRENT USE OF INSULIN (HCC): ICD-10-CM

## 2020-09-05 LAB
EST. AVERAGE GLUCOSE BLD GHB EST-MCNC: 154 MG/DL
HBA1C MFR BLD: 7 % (ref 0–5.6)

## 2020-09-05 PROCEDURE — 36415 COLL VENOUS BLD VENIPUNCTURE: CPT

## 2020-09-05 PROCEDURE — 83036 HEMOGLOBIN GLYCOSYLATED A1C: CPT

## 2020-09-16 ENCOUNTER — PATIENT MESSAGE (OUTPATIENT)
Dept: MEDICAL GROUP | Facility: LAB | Age: 50
End: 2020-09-16

## 2020-09-21 ENCOUNTER — OFFICE VISIT (OUTPATIENT)
Dept: MEDICAL GROUP | Facility: LAB | Age: 50
End: 2020-09-21
Payer: COMMERCIAL

## 2020-09-21 VITALS
WEIGHT: 286.4 LBS | OXYGEN SATURATION: 94 % | RESPIRATION RATE: 14 BRPM | HEIGHT: 65 IN | TEMPERATURE: 98.6 F | BODY MASS INDEX: 47.72 KG/M2 | SYSTOLIC BLOOD PRESSURE: 125 MMHG | HEART RATE: 84 BPM | DIASTOLIC BLOOD PRESSURE: 80 MMHG

## 2020-09-21 DIAGNOSIS — R51.9 NONINTRACTABLE HEADACHE, UNSPECIFIED CHRONICITY PATTERN, UNSPECIFIED HEADACHE TYPE: ICD-10-CM

## 2020-09-21 DIAGNOSIS — I15.9 SECONDARY HYPERTENSION: ICD-10-CM

## 2020-09-21 DIAGNOSIS — E66.01 MORBID OBESITY (HCC): ICD-10-CM

## 2020-09-21 DIAGNOSIS — E55.9 VITAMIN D DEFICIENCY: ICD-10-CM

## 2020-09-21 PROCEDURE — 99214 OFFICE O/P EST MOD 30 MIN: CPT | Performed by: INTERNAL MEDICINE

## 2020-09-21 ASSESSMENT — FIBROSIS 4 INDEX: FIB4 SCORE: 0.89

## 2020-09-22 NOTE — PROGRESS NOTES
CC: Logan Mahoney is a 50 y.o. female is suffering from   Chief Complaint   Patient presents with   • Hypertension     blood pressures running low   • Diabetes         SUBJECTIVE:  1. Vitamin D deficiency  Patient is here for follow-up is in need of vitamin D being rechecked    2. Nonintractable headache, unspecified chronicity pattern, unspecified headache type  Etiology uncertain patient is concerned this may be related to Actos or possibly Coreg    3. Morbid obesity (HCC)  Ongoing, new diet    4. Secondary hypertension  Consider possible changes to Coreg        Past social, family, history:   Social History     Tobacco Use   • Smoking status: Never Smoker   • Smokeless tobacco: Never Used   Substance Use Topics   • Alcohol use: No   • Drug use: No         MEDICATIONS:    Current Outpatient Medications:   •  pioglitazone (ACTOS) 15 MG Tab, Take 1 Tab by mouth every day., Disp: 30 Tab, Rfl: 11  •  metFORMIN (GLUCOPHAGE) 500 MG Tab, TAKE 1 TABLET BY MOUTH TWICE DAILY WITH MEALS, Disp: 180 Tab, Rfl: 3  •  carvedilol (COREG) 6.25 MG Tab, TAKE 1 TABLET BY MOUTH TWICE DAILY WITH MEALS, Disp: 180 Tab, Rfl: 3  •  ALBUTEROL SULFATE HFA INH, INHALE 2 PUFFS PO Q 6 H PRN FOR SOB, Disp: , Rfl: 0  •  nystatin-triamcinalone (MYCOLOG) 493290-2.1 UNIT/GM-% Ointment, Apply 1 Units to affected area(s) 2 times a day., Disp: 1 Tube, Rfl: 0  •  magnesium oxide (MAG-OX) 400 MG Tab, Take 400 mg by mouth every day., Disp: , Rfl:   •  calcium carbonate (OS-JUVENAL 500) 500 MG Tab, Take 1,000 mg by mouth 2 times a day, with meals., Disp: , Rfl:   •  Cyanocobalamin (VITAMIN B-12) 3000 MCG SL Tab, Place  under tongue., Disp: , Rfl:   •  vitamin D (CHOLECALCIFEROL) 1000 UNIT Tab, Take 1,000 Units by mouth every day., Disp: , Rfl:   •  Omega 3-6-9 Fatty Acids (TRIPLE OMEGA-3-6-9) Cap, Take  by mouth., Disp: , Rfl:   •  PARoxetine (PAXIL) 20 MG Tab, Take 1 Tab by mouth every evening., Disp: 90 Tab, Rfl: 3  •  multivitamin (THERAGRAN)  Tab, Take 1 Tab by mouth every day., Disp: , Rfl:   •  PARoxetine (PAXIL) 20 MG Tab, TAKE 1 TABLET BY MOUTH EVERY DAY, Disp: 90 Tab, Rfl: 4  •  doxycycline (VIBRAMYCIN) 100 MG Tab, Take 1 Tab by mouth 2 times a day. (Patient not taking: Reported on 5/22/2020), Disp: 14 Tab, Rfl: 0  •  methylPREDNISolone (MEDROL DOSEPAK) 4 MG Tablet Therapy Pack, Follow schedule on package instructions. (Patient not taking: Reported on 2/21/2020), Disp: 21 Tab, Rfl: 0  •  Blood Glucose Monitoring Suppl (ONE TOUCH ULTRA 2) w/Device Kit, USE FOR BLOOD SUGAR TEST UTD, Disp: , Rfl: 0  •  ONE TOUCH ULTRA TEST strip, USE ONE STRIP FOR BLOOD SUGAR TEST D AND PRN, Disp: , Rfl: 3  •  ONETOUCH DELICA LANCETS 33G Misc, USE ONE LANCET FOR BLOOD SUGAR TEST D AND PRN, Disp: , Rfl: 3  •  acyclovir (ZOVIRAX) 400 MG tablet, TK ONE T PO TID WF AND FULL GLASS OF WATER FOR 7 DAYS, Disp: , Rfl: 1  •  NON SPECIFIED, Glucometer and Test Strips and related supplies as covered by insurance.  QS for 90 days check qd and prn, Disp: 150 Each, Rfl: 3  •  cetirizine (ZYRTEC) 10 MG Tab, Take 1 Tab by mouth every day. (Patient not taking: Reported on 6/25/2020), Disp: 30 Tab, Rfl: 0  •  fluticasone (FLONASE) 50 MCG/ACT nasal spray, Spray 2 Sprays in nose every day. (Patient not taking: Reported on 6/25/2020), Disp: 16 g, Rfl: 0  •  NON SPECIFIED, Nocturnal desaturation study., Disp: 1 Each, Rfl: 0  •  vitamin D, Ergocalciferol, (DRISDOL) 03037 UNIT CAPS capsule, Take 1,000 Units by mouth every evening., Disp: , Rfl:     PROBLEMS:  Patient Active Problem List    Diagnosis Date Noted   • Preop cardiovascular exam 07/20/2012     Priority: High   • Nonspecific abnormal electrocardiogram (ECG) (EKG) 07/20/2012     Priority: High   • Benign hypertension 07/20/2012     Priority: Medium   • DM II (diabetes mellitus, type II), controlled ([facility]) 06/12/2019   • Vitamin D deficiency disease 12/02/2016   • Morbid obesity (HCC) 05/31/2016   • S/P gastric surgery 04/18/2016  "      REVIEW OF SYSTEMS:  Gen.:  No Nausea, Vomiting, fever, Chills.  Heart: No chest pain.  Lungs:  No shortness of Breath.  Psychological: Rinku unusual Anxiety depression     PHYSICAL EXAM   Constitutional: Alert, cooperative, not in acute distress.  Cardiovascular:  Rate Rhythm is regular without murmurs rubs clicks.     Thorax & Lungs: Clear to auscultation, no wheezing, rhonchi, or rales  HENT: Normocephalic, Atraumatic.  Eyes: PERRLA, EOMI, Conjunctiva normal.   Neck: Trachia is midline no swelling of the thyroid.   Lymphatic: No lymphadenopathy noted.   Neurologic: Alert & oriented x 3, cranial nerves II through XII are intact, Normal motor function, Normal sensory function, No focal deficits noted.   Psychiatric: Affect normal, Judgment normal, Mood normal.     VITAL SIGNS:/80   Pulse 84   Temp 37 °C (98.6 °F) (Temporal)   Resp 14   Ht 1.638 m (5' 4.5\")   Wt (!) 129.9 kg (286 lb 6.4 oz)   LMP 03/01/2016   SpO2 94%   BMI 48.40 kg/m²     Labs: Reviewed    Assessment:                                                     Plan:    1. Vitamin D deficiency  Check vitamin D  - VITAMIN D,25 HYDROXY; Future    2. Nonintractable headache, unspecified chronicity pattern, unspecified headache type  Etiology uncertain consider possible drug side effect consider possibly stopping Coreg or changing him to an alternative medication    3. Morbid obesity (HCC)  Patient on a new diet    4. Secondary hypertension  No change currently consider discontinuing or changing Coreg        Consider chnages to coreg.   "

## 2020-11-02 ENCOUNTER — HOSPITAL ENCOUNTER (OUTPATIENT)
Dept: LAB | Facility: MEDICAL CENTER | Age: 50
End: 2020-11-02
Attending: CHIROPRACTOR
Payer: COMMERCIAL

## 2020-11-02 ENCOUNTER — HOSPITAL ENCOUNTER (OUTPATIENT)
Dept: LAB | Facility: MEDICAL CENTER | Age: 50
End: 2020-11-02
Attending: INTERNAL MEDICINE
Payer: COMMERCIAL

## 2020-11-02 DIAGNOSIS — E55.9 VITAMIN D DEFICIENCY: ICD-10-CM

## 2020-11-02 LAB
25(OH)D3 SERPL-MCNC: 53 NG/ML (ref 30–100)
ALBUMIN SERPL BCP-MCNC: 3.9 G/DL (ref 3.2–4.9)
ALBUMIN/GLOB SERPL: 1.2 G/DL
ALP SERPL-CCNC: 86 U/L (ref 30–99)
ALT SERPL-CCNC: 21 U/L (ref 2–50)
ANION GAP SERPL CALC-SCNC: 9 MMOL/L (ref 7–16)
AST SERPL-CCNC: 16 U/L (ref 12–45)
BASOPHILS # BLD AUTO: 0.7 % (ref 0–1.8)
BASOPHILS # BLD: 0.06 K/UL (ref 0–0.12)
BILIRUB SERPL-MCNC: 0.2 MG/DL (ref 0.1–1.5)
BUN SERPL-MCNC: 14 MG/DL (ref 8–22)
CALCIUM SERPL-MCNC: 9.2 MG/DL (ref 8.5–10.5)
CHLORIDE SERPL-SCNC: 107 MMOL/L (ref 96–112)
CO2 SERPL-SCNC: 25 MMOL/L (ref 20–33)
CREAT SERPL-MCNC: 0.65 MG/DL (ref 0.5–1.4)
EOSINOPHIL # BLD AUTO: 0.2 K/UL (ref 0–0.51)
EOSINOPHIL NFR BLD: 2.5 % (ref 0–6.9)
ERYTHROCYTE [DISTWIDTH] IN BLOOD BY AUTOMATED COUNT: 45.8 FL (ref 35.9–50)
FASTING STATUS PATIENT QL REPORTED: NORMAL
GLOBULIN SER CALC-MCNC: 3.2 G/DL (ref 1.9–3.5)
GLUCOSE SERPL-MCNC: 146 MG/DL (ref 65–99)
HCT VFR BLD AUTO: 46.4 % (ref 37–47)
HGB BLD-MCNC: 14.8 G/DL (ref 12–16)
IMM GRANULOCYTES # BLD AUTO: 0.04 K/UL (ref 0–0.11)
IMM GRANULOCYTES NFR BLD AUTO: 0.5 % (ref 0–0.9)
LYMPHOCYTES # BLD AUTO: 2.28 K/UL (ref 1–4.8)
LYMPHOCYTES NFR BLD: 28.3 % (ref 22–41)
MCH RBC QN AUTO: 30.5 PG (ref 27–33)
MCHC RBC AUTO-ENTMCNC: 31.9 G/DL (ref 33.6–35)
MCV RBC AUTO: 95.5 FL (ref 81.4–97.8)
MONOCYTES # BLD AUTO: 0.5 K/UL (ref 0–0.85)
MONOCYTES NFR BLD AUTO: 6.2 % (ref 0–13.4)
NEUTROPHILS # BLD AUTO: 4.98 K/UL (ref 2–7.15)
NEUTROPHILS NFR BLD: 61.8 % (ref 44–72)
NRBC # BLD AUTO: 0 K/UL
NRBC BLD-RTO: 0 /100 WBC
PLATELET # BLD AUTO: 294 K/UL (ref 164–446)
PMV BLD AUTO: 10.7 FL (ref 9–12.9)
POTASSIUM SERPL-SCNC: 4.5 MMOL/L (ref 3.6–5.5)
PROT SERPL-MCNC: 7.1 G/DL (ref 6–8.2)
RBC # BLD AUTO: 4.86 M/UL (ref 4.2–5.4)
SODIUM SERPL-SCNC: 141 MMOL/L (ref 135–145)
T4 SERPL-MCNC: 6.8 UG/DL (ref 4–12)
TSH SERPL DL<=0.005 MIU/L-ACNC: 2.2 UIU/ML (ref 0.38–5.33)
WBC # BLD AUTO: 8.1 K/UL (ref 4.8–10.8)

## 2020-11-02 PROCEDURE — 84443 ASSAY THYROID STIM HORMONE: CPT

## 2020-11-02 PROCEDURE — 84436 ASSAY OF TOTAL THYROXINE: CPT

## 2020-11-02 PROCEDURE — 84479 ASSAY OF THYROID (T3 OR T4): CPT

## 2020-11-02 PROCEDURE — 82306 VITAMIN D 25 HYDROXY: CPT

## 2020-11-02 PROCEDURE — 80053 COMPREHEN METABOLIC PANEL: CPT

## 2020-11-02 PROCEDURE — 36415 COLL VENOUS BLD VENIPUNCTURE: CPT

## 2020-11-02 PROCEDURE — 83525 ASSAY OF INSULIN: CPT

## 2020-11-02 PROCEDURE — 85025 COMPLETE CBC W/AUTO DIFF WBC: CPT

## 2020-11-03 LAB
INSULIN P FAST SERPL-ACNC: 57 UIU/ML (ref 3–19)
T3RU NFR SERPL: 29 % (ref 28–41)

## 2020-11-05 ENCOUNTER — OFFICE VISIT (OUTPATIENT)
Dept: MEDICAL GROUP | Facility: LAB | Age: 50
End: 2020-11-05
Payer: COMMERCIAL

## 2020-11-05 VITALS
SYSTOLIC BLOOD PRESSURE: 130 MMHG | DIASTOLIC BLOOD PRESSURE: 85 MMHG | HEIGHT: 65 IN | HEART RATE: 70 BPM | RESPIRATION RATE: 14 BRPM | WEIGHT: 286.4 LBS | TEMPERATURE: 97.9 F | BODY MASS INDEX: 47.72 KG/M2 | OXYGEN SATURATION: 94 %

## 2020-11-05 DIAGNOSIS — E11.9 CONTROLLED TYPE 2 DIABETES MELLITUS WITHOUT COMPLICATION, WITHOUT LONG-TERM CURRENT USE OF INSULIN (HCC): ICD-10-CM

## 2020-11-05 PROCEDURE — 99213 OFFICE O/P EST LOW 20 MIN: CPT | Performed by: INTERNAL MEDICINE

## 2020-11-05 ASSESSMENT — FIBROSIS 4 INDEX: FIB4 SCORE: 0.57

## 2020-11-05 NOTE — LETTER
"[employer]" [facility]  Manuel Barbosa D.O.  [address]  [place] [address]  Fax: [phone]   Authorization for Release/Disclosure of   Protected Health Information   Name: LOGAN WARREN : 1970 SSN: [id number]   Address: [address] [place] [address] Phone:    [phone] (home)    I authorize the entity listed below to release/disclose the PHI below to:   [facility]/Manuel Barbosa D.O. and Manuel Barbosa D.O.   Provider or Entity Name:     Address   City, State, Zip   Phone:      Fax:     Reason for request: continuity of care   Information to be released:    [  ] LAST COLONOSCOPY,  including any PATH REPORT and follow-up  [  ] LAST FIT/COLOGUARD RESULT [  ] LAST DEXA  [  ] LAST MAMMOGRAM  [  ] LAST PAP  [  ] LAST LABS [  ] RETINA EXAM REPORT  [  ] IMMUNIZATION RECORDS  [  ] Release all info      [  ] Check here and initial the line next to each item to release ALL health information INCLUDING  _____ Care and treatment for drug and / or alcohol abuse  _____ HIV testing, infection status, or AIDS  _____ Genetic Testing    DATES OF SERVICE OR TIME PERIOD TO BE DISCLOSED: _____________  I understand and acknowledge that:  * This Authorization may be revoked at any time by you in writing, except if your health information has already been used or disclosed.  * Your health information that will be used or disclosed as a result of you signing this authorization could be re-disclosed by the recipient. If this occurs, your re-disclosed health information may no longer be protected by State or Federal laws.  * You may refuse to sign this Authorization. Your refusal will not affect your ability to obtain treatment.  * This Authorization becomes effective upon signing and will  on (date) __________.      If no date is indicated, this Authorization will  one (1) year from the signature date.    Name: Logan Warren    Signature:   Date:      11/5/2020       PLEASE FAX REQUESTED RECORDS BACK TO: [phone]

## 2020-11-06 NOTE — PROGRESS NOTES
CC: Logan Mahoney is a 50 y.o. female is suffering from   Chief Complaint   Patient presents with   • Results     follow up for lab results         SUBJECTIVE:  1. Controlled type 2 diabetes mellitus without complication, without long-term current use of insulin (HCC)  Pamela is here for follow-up has a history of type 2 diabetes, is currently stable.        Past social, family, history:   Social History     Tobacco Use   • Smoking status: Never Smoker   • Smokeless tobacco: Never Used   Substance Use Topics   • Alcohol use: No   • Drug use: No         MEDICATIONS:    Current Outpatient Medications:   •  metFORMIN (GLUCOPHAGE) 500 MG Tab, TAKE 1 TABLET BY MOUTH TWICE DAILY WITH MEALS, Disp: 180 Tab, Rfl: 3  •  PARoxetine (PAXIL) 20 MG Tab, TAKE 1 TABLET BY MOUTH EVERY DAY, Disp: 90 Tab, Rfl: 4  •  carvedilol (COREG) 6.25 MG Tab, TAKE 1 TABLET BY MOUTH TWICE DAILY WITH MEALS, Disp: 180 Tab, Rfl: 3  •  vitamin D (CHOLECALCIFEROL) 1000 UNIT Tab, Take 1,000 Units by mouth every day., Disp: , Rfl:   •  pioglitazone (ACTOS) 15 MG Tab, Take 1 Tab by mouth every day., Disp: 30 Tab, Rfl: 11  •  doxycycline (VIBRAMYCIN) 100 MG Tab, Take 1 Tab by mouth 2 times a day. (Patient not taking: Reported on 5/22/2020), Disp: 14 Tab, Rfl: 0  •  methylPREDNISolone (MEDROL DOSEPAK) 4 MG Tablet Therapy Pack, Follow schedule on package instructions. (Patient not taking: Reported on 2/21/2020), Disp: 21 Tab, Rfl: 0  •  Blood Glucose Monitoring Suppl (ONE TOUCH ULTRA 2) w/Device Kit, USE FOR BLOOD SUGAR TEST UTD, Disp: , Rfl: 0  •  ONE TOUCH ULTRA TEST strip, USE ONE STRIP FOR BLOOD SUGAR TEST D AND PRN, Disp: , Rfl: 3  •  ONETOUCH DELICA LANCETS 33G Misc, USE ONE LANCET FOR BLOOD SUGAR TEST D AND PRN, Disp: , Rfl: 3  •  ALBUTEROL SULFATE HFA INH, INHALE 2 PUFFS PO Q 6 H PRN FOR SOB, Disp: , Rfl: 0  •  acyclovir (ZOVIRAX) 400 MG tablet, TK ONE T PO TID WF AND FULL GLASS OF WATER FOR 7 DAYS, Disp: , Rfl: 1  •  NON SPECIFIED,  Glucometer and Test Strips and related supplies as covered by insurance.  QS for 90 days check qd and prn, Disp: 150 Each, Rfl: 3  •  nystatin-triamcinalone (MYCOLOG) 798767-6.1 UNIT/GM-% Ointment, Apply 1 Units to affected area(s) 2 times a day., Disp: 1 Tube, Rfl: 0  •  cetirizine (ZYRTEC) 10 MG Tab, Take 1 Tab by mouth every day. (Patient not taking: Reported on 6/25/2020), Disp: 30 Tab, Rfl: 0  •  fluticasone (FLONASE) 50 MCG/ACT nasal spray, Spray 2 Sprays in nose every day. (Patient not taking: Reported on 6/25/2020), Disp: 16 g, Rfl: 0  •  magnesium oxide (MAG-OX) 400 MG Tab, Take 400 mg by mouth every day., Disp: , Rfl:   •  calcium carbonate (OS-JUVENAL 500) 500 MG Tab, Take 1,000 mg by mouth 2 times a day, with meals., Disp: , Rfl:   •  Cyanocobalamin (VITAMIN B-12) 3000 MCG SL Tab, Place  under tongue., Disp: , Rfl:   •  Omega 3-6-9 Fatty Acids (TRIPLE OMEGA-3-6-9) Cap, Take  by mouth., Disp: , Rfl:   •  PARoxetine (PAXIL) 20 MG Tab, Take 1 Tab by mouth every evening., Disp: 90 Tab, Rfl: 3  •  NON SPECIFIED, Nocturnal desaturation study., Disp: 1 Each, Rfl: 0  •  multivitamin (THERAGRAN) Tab, Take 1 Tab by mouth every day., Disp: , Rfl:   •  vitamin D, Ergocalciferol, (DRISDOL) 64810 UNIT CAPS capsule, Take 1,000 Units by mouth every evening., Disp: , Rfl:     PROBLEMS:  Patient Active Problem List    Diagnosis Date Noted   • Preop cardiovascular exam 07/20/2012     Priority: High   • Nonspecific abnormal electrocardiogram (ECG) (EKG) 07/20/2012     Priority: High   • Benign hypertension 07/20/2012     Priority: Medium   • DM II (diabetes mellitus, type II), controlled ([facility]) 06/12/2019   • Vitamin D deficiency disease 12/02/2016   • Morbid obesity (HCC) 05/31/2016   • S/P gastric surgery 04/18/2016       REVIEW OF SYSTEMS:  Gen.:  No Nausea, Vomiting, fever, Chills.  Heart: No chest pain.  Lungs:  No shortness of Breath.  Psychological: Rinku unusual Anxiety depression     PHYSICAL EXAM   Constitutional:  "Alert, cooperative, not in acute distress.  Cardiovascular:  Rate Rhythm is regular without murmurs rubs clicks.     Thorax & Lungs: Clear to auscultation, no wheezing, rhonchi, or rales  HENT: Normocephalic, Atraumatic.  Eyes: PERRLA, EOMI, Conjunctiva normal.   Neck: Trachia is midline no swelling of the thyroid.   Lymphatic: No lymphadenopathy noted.   Neurologic: Alert & oriented x 3, cranial nerves II through XII are intact, Normal motor function, Normal sensory function, No focal deficits noted.   Psychiatric: Affect normal, Judgment normal, Mood normal.     VITAL SIGNS:/85   Pulse 70   Temp 36.6 °C (97.9 °F) (Temporal)   Resp 14   Ht 1.638 m (5' 4.5\")   Wt (!) 129.9 kg (286 lb 6.4 oz)   LMP 03/01/2016   SpO2 94%   BMI 48.40 kg/m²     Labs: Reviewed    Assessment:                                                     Plan:    1. Controlled type 2 diabetes mellitus without complication, without long-term current use of insulin ([facility])  Recheck hemoglobin A1c recheck lipid profile continue to encourage patient to lose weight  - HEMOGLOBIN A1C; Future  - Lipid Profile; Future        "

## 2020-11-12 ENCOUNTER — TELEPHONE (OUTPATIENT)
Dept: MEDICAL GROUP | Facility: LAB | Age: 50
End: 2020-11-12

## 2020-11-12 DIAGNOSIS — Z20.822 EXPOSURE TO COVID-19 VIRUS: ICD-10-CM

## 2020-11-12 NOTE — TELEPHONE ENCOUNTER
1. 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 the co worker is now showing symptoms.  Pamela doesn't have any symptoms but is wondering if she should get tested.

## 2020-11-13 ENCOUNTER — HOSPITAL ENCOUNTER (OUTPATIENT)
Dept: LAB | Facility: MEDICAL CENTER | Age: 50
End: 2020-11-13
Attending: INTERNAL MEDICINE
Payer: COMMERCIAL

## 2020-11-13 PROCEDURE — U0003 INFECTIOUS AGENT DETECTION BY NUCLEIC ACID (DNA OR RNA); SEVERE ACUTE RESPIRATORY SYNDROME CORONAVIRUS 2 (SARS-COV-2) (CORONAVIRUS DISEASE [COVID-19]), AMPLIFIED PROBE TECHNIQUE, MAKING USE OF HIGH THROUGHPUT TECHNOLOGIES AS DESCRIBED BY CMS-2020-01-R: HCPCS

## 2020-11-13 PROCEDURE — C9803 HOPD COVID-19 SPEC COLLECT: HCPCS

## 2020-11-14 LAB
COVID ORDER STATUS COVID19: NORMAL
SARS-COV-2 RNA RESP QL NAA+PROBE: NOTDETECTED
SPECIMEN SOURCE: NORMAL

## 2020-11-18 DIAGNOSIS — E66.01 MORBID OBESITY (HCC): ICD-10-CM

## 2020-11-18 RX ORDER — PHENTERMINE HYDROCHLORIDE 37.5 MG/1
CAPSULE ORAL
Qty: 30 CAP | Refills: 0 | Status: SHIPPED | OUTPATIENT
Start: 2020-11-18 | End: 2021-06-07

## 2020-11-18 NOTE — TELEPHONE ENCOUNTER
Received request via: Pharmacy    Was the patient seen in the last year in this department? Yes  11/5/20  Does the patient have an active prescription (recently filled or refills available) for medication(s) requested? No

## 2020-12-30 RX ORDER — INDOMETHACIN 50 MG/1
50 CAPSULE ORAL 3 TIMES DAILY
Qty: 90 CAP | Refills: 0 | OUTPATIENT
Start: 2020-12-30

## 2020-12-30 NOTE — TELEPHONE ENCOUNTER
Received request via: Patient having arthritis flare up    Was the patient seen in the last year in this department? Yes  11/5/20  Does the patient have an active prescription (recently filled or refills available) for medication(s) requested? No

## 2021-01-07 ENCOUNTER — TELEPHONE (OUTPATIENT)
Dept: MEDICAL GROUP | Facility: LAB | Age: 51
End: 2021-01-07

## 2021-01-07 NOTE — TELEPHONE ENCOUNTER
1. Caller Name: Logan                         Call Back Number: [phone] (home)        How would the patient prefer to be contacted with a response: Phone call OK to leave a detailed message     She wants to know if you think she should have the COVID vaccine even though she has a lot of allergies.

## 2021-03-08 ENCOUNTER — RX ONLY (OUTPATIENT)
Age: 51
Setting detail: RX ONLY
End: 2021-03-08

## 2021-03-08 RX ORDER — ACYCLOVIR 400 MG/1
TABLET ORAL
Qty: 21 | Refills: 1 | Status: ERX

## 2021-03-17 ENCOUNTER — HOSPITAL ENCOUNTER (OUTPATIENT)
Facility: MEDICAL CENTER | Age: 51
End: 2021-03-17
Attending: OBSTETRICS & GYNECOLOGY
Payer: COMMERCIAL

## 2021-03-17 ENCOUNTER — GYNECOLOGY VISIT (OUTPATIENT)
Dept: OBGYN | Facility: CLINIC | Age: 51
End: 2021-03-17
Payer: COMMERCIAL

## 2021-03-17 VITALS
SYSTOLIC BLOOD PRESSURE: 169 MMHG | WEIGHT: 290 LBS | DIASTOLIC BLOOD PRESSURE: 103 MMHG | HEIGHT: 64 IN | BODY MASS INDEX: 49.51 KG/M2

## 2021-03-17 DIAGNOSIS — Z01.419 ENCNTR FOR GYN EXAM (GENERAL) (ROUTINE) W/O ABN FINDINGS: ICD-10-CM

## 2021-03-17 DIAGNOSIS — E66.01 MORBID OBESITY (HCC): ICD-10-CM

## 2021-03-17 DIAGNOSIS — N94.10 DYSPAREUNIA IN FEMALE: ICD-10-CM

## 2021-03-17 DIAGNOSIS — Z12.4 CERVICAL CANCER SCREENING: ICD-10-CM

## 2021-03-17 DIAGNOSIS — Z12.31 ENCOUNTER FOR SCREENING MAMMOGRAM FOR MALIGNANT NEOPLASM OF BREAST: ICD-10-CM

## 2021-03-17 DIAGNOSIS — Z80.41 FAMILY HISTORY OF OVARIAN CANCER: ICD-10-CM

## 2021-03-17 DIAGNOSIS — Z78.0 POSTMENOPAUSAL: ICD-10-CM

## 2021-03-17 PROCEDURE — 88175 CYTOPATH C/V AUTO FLUID REDO: CPT

## 2021-03-17 PROCEDURE — 87624 HPV HI-RISK TYP POOLED RSLT: CPT

## 2021-03-17 PROCEDURE — 99386 PREV VISIT NEW AGE 40-64: CPT | Performed by: OBSTETRICS & GYNECOLOGY

## 2021-03-17 ASSESSMENT — FIBROSIS 4 INDEX: FIB4 SCORE: 0.58

## 2021-03-17 NOTE — PROGRESS NOTES
"Subjective:      Logan Mahoney is a 51 y.o. female who presents as New Patient (Annual)            HPI patient is a 51-year-old who presents today as a new patient for annual gynecologic exam.  Patient is doing well currently.  Patient states she went through menopause about 5 to 7 years ago and has not had any postmenopausal bleeding.  Denies any pelvic pain or abnormal discharge.  Patient reports normal bowel and bladder functions.  Denies depression symptoms  She does report some pain with intercourse due to dryness  Patient does self breast exams and her last mammogram was a year ago.  She reports no history of abnormal Pap smear PID or STDs  Family history is positive for ovarian cancer in her paternal grandmother  Patient states she had BRCA testing and was negative for both    Patient had gastric bypass surgery 7 years ago and states 3 years ago procedure was reversed due to herniation.  She states she started exercising recently.    Patient has history of hypertension which is currently controlled with Coreg.  Patient states she does blood pressure monitoring at home and her blood pressures usually run normal.  We did several blood pressure measurements today in office and they were all elevated likely due to blood pressure cuff size-we do not have extra-large blood pressure cuff available currently.  Patient will repeat blood pressures when she go home and if blood pressures are elevated, she will contact her PCP to see if she needs any medication adjustment.      ROS all organ systems are reviewed and are negative except for complaints in HPI     Objective:     BP (!) 169/103 (BP Location: Right arm, Patient Position: Sitting, BP Cuff Size: Adult long)   Ht 1.626 m (5' 4\")   Wt (!) 132 kg (290 lb)   LMP 03/01/2016   BMI 49.78 kg/m²      Physical Exam  Vitals and nursing note reviewed. Exam conducted with a chaperone present.   Constitutional:       General: She is not in acute distress.     " Appearance: Normal appearance. She is obese. She is not toxic-appearing.   HENT:      Head: Normocephalic and atraumatic.   Eyes:      General:         Right eye: No discharge.         Left eye: No discharge.      Conjunctiva/sclera: Conjunctivae normal.   Cardiovascular:      Rate and Rhythm: Normal rate and regular rhythm.      Pulses: Normal pulses.      Heart sounds: Normal heart sounds. No murmur. No gallop.    Pulmonary:      Effort: Pulmonary effort is normal. No respiratory distress.      Breath sounds: Normal breath sounds. No wheezing.   Chest:      Breasts:         Right: No swelling, bleeding, inverted nipple, mass, nipple discharge, skin change or tenderness.         Left: No swelling, bleeding, inverted nipple, mass, nipple discharge, skin change or tenderness.   Abdominal:      General: Abdomen is flat. Bowel sounds are normal. There is no distension.      Palpations: Abdomen is soft.      Tenderness: There is no abdominal tenderness.   Genitourinary:     General: Normal vulva.      Labia:         Right: No rash, tenderness, lesion or injury.         Left: No rash, tenderness, lesion or injury.       Urethra: No prolapse.      Vagina: No vaginal discharge, tenderness or bleeding.      Cervix: No cervical motion tenderness, friability or erythema.      Uterus: Not enlarged and not tender.       Adnexa:         Right: No mass, tenderness or fullness.          Left: No mass, tenderness or fullness.        Rectum: No external hemorrhoid.      Comments: Atrophic appearing external genitalia without lesions  Atrophic vaginal mucosa without bleeding lesions or discharge.  Cervix is atrophic in appearance.  Bimanual exam is limited due to habitus  Musculoskeletal:         General: Normal range of motion.      Cervical back: Normal range of motion and neck supple. No rigidity.      Right lower leg: No edema.      Left lower leg: No edema.   Lymphadenopathy:      Cervical: No cervical adenopathy.      Upper  Body:      Right upper body: No supraclavicular or axillary adenopathy.      Left upper body: No supraclavicular or axillary adenopathy.      Lower Body: No right inguinal adenopathy. No left inguinal adenopathy.   Skin:     General: Skin is warm and dry.      Coloration: Skin is not jaundiced.      Findings: No lesion.   Neurological:      General: No focal deficit present.      Mental Status: She is alert and oriented to person, place, and time.      Gait: Gait normal.   Psychiatric:         Mood and Affect: Mood normal.         Behavior: Behavior normal.         Thought Content: Thought content normal.         Judgment: Judgment normal.                 Assessment/Plan:        1. Encntr for gyn exam (general) (routine) w/o abn findings  51-year-old here for annual gynecologic exam.  Exam is within normal limits  Diet exercise and weight management reviewed  Self breast exams encouraged      2. Cervical cancer screening  Pap smear obtained today.  Screening recommendations discussed-Pap can be repeated in 3 years if this one is normal  - THINPREP PAP WITH HPV; Future    3. Encounter for screening mammogram for malignant neoplasm of breast  Mammogram ordered.  Breast cancer screening recommendations reviewed  - MA-SCREENING MAMMO BILAT W/TOMOSYNTHESIS W/CAD; Future    4. Morbid obesity (HCC)  Obesity reviewed.  Patient will continue follow-up evaluation with PCP    5.  Vaginal dryness and dyspareunia.  We discussed regular daily use of vaginal moisturizer and water-based lubrication with intercourse    6.  Precautions and plan of care were discussed.  Patient to call with any questions or concerns.  Patient to follow-up for annual gynecologic exam

## 2021-03-18 DIAGNOSIS — Z12.4 CERVICAL CANCER SCREENING: ICD-10-CM

## 2021-03-18 LAB
CYTOLOGY REG CYTOL: NORMAL
HPV HR 12 DNA CVX QL NAA+PROBE: NEGATIVE
HPV16 DNA SPEC QL NAA+PROBE: NEGATIVE
HPV18 DNA SPEC QL NAA+PROBE: NEGATIVE
SPECIMEN SOURCE: NORMAL

## 2021-03-31 ENCOUNTER — HOSPITAL ENCOUNTER (OUTPATIENT)
Dept: RADIOLOGY | Facility: MEDICAL CENTER | Age: 51
End: 2021-03-31
Attending: OBSTETRICS & GYNECOLOGY
Payer: COMMERCIAL

## 2021-03-31 DIAGNOSIS — Z80.41 FAMILY HISTORY OF OVARIAN CANCER: ICD-10-CM

## 2021-03-31 PROCEDURE — 76830 TRANSVAGINAL US NON-OB: CPT

## 2021-04-05 ENCOUNTER — TELEPHONE (OUTPATIENT)
Dept: OBGYN | Facility: CLINIC | Age: 51
End: 2021-04-05

## 2021-04-05 NOTE — TELEPHONE ENCOUNTER
----- Message from Yousuf Jacobson M.D. sent at 4/1/2021 10:32 AM PDT -----  Please let patient know that pelvic ultrasound was normal.  Right ovary was normal size.  Left ovary was not visualized which could be due to bowel gas or atrophy/shrinking with age but no masses were noted on the left side    4/5/21 0948 Called pt, [facility] to reach her. [facility]

## 2021-04-08 ENCOUNTER — TELEPHONE (OUTPATIENT)
Dept: OBGYN | Facility: CLINIC | Age: 51
End: 2021-04-08

## 2021-04-08 NOTE — TELEPHONE ENCOUNTER
----- Message from Yousuf Jacobson M.D. sent at 4/1/2021 10:32 AM PDT -----  Please let patient know that pelvic ultrasound was normal.  Right ovary was normal size.  Left ovary was not visualized which could be due to bowel gas or atrophy/shrinking with age but no masses were noted on the left side      4/8/2021 1349 Left message for pt to call back regarding US results.   4/9/2021 0953 Left message for pt to call back regarding US results.   1128 pt called back and notified as above. Pt agreed and verbalized understanding.

## 2021-05-20 ENCOUNTER — APPOINTMENT (RX ONLY)
Dept: URBAN - METROPOLITAN AREA CLINIC 22 | Facility: CLINIC | Age: 51
Setting detail: DERMATOLOGY
End: 2021-05-20

## 2021-05-20 DIAGNOSIS — Z71.89 OTHER SPECIFIED COUNSELING: ICD-10-CM

## 2021-05-20 DIAGNOSIS — L91.8 OTHER HYPERTROPHIC DISORDERS OF THE SKIN: ICD-10-CM

## 2021-05-20 DIAGNOSIS — D22 MELANOCYTIC NEVI: ICD-10-CM

## 2021-05-20 DIAGNOSIS — L73.8 OTHER SPECIFIED FOLLICULAR DISORDERS: ICD-10-CM

## 2021-05-20 DIAGNOSIS — L82.1 OTHER SEBORRHEIC KERATOSIS: ICD-10-CM

## 2021-05-20 DIAGNOSIS — L82.0 INFLAMED SEBORRHEIC KERATOSIS: ICD-10-CM

## 2021-05-20 DIAGNOSIS — L44.8 OTHER SPECIFIED PAPULOSQUAMOUS DISORDERS: ICD-10-CM

## 2021-05-20 DIAGNOSIS — L81.4 OTHER MELANIN HYPERPIGMENTATION: ICD-10-CM

## 2021-05-20 DIAGNOSIS — D18.0 HEMANGIOMA: ICD-10-CM

## 2021-05-20 PROBLEM — D18.01 HEMANGIOMA OF SKIN AND SUBCUTANEOUS TISSUE: Status: ACTIVE | Noted: 2021-05-20

## 2021-05-20 PROBLEM — D22.5 MELANOCYTIC NEVI OF TRUNK: Status: ACTIVE | Noted: 2021-05-20

## 2021-05-20 PROCEDURE — ? COUNSELING

## 2021-05-20 PROCEDURE — 99213 OFFICE O/P EST LOW 20 MIN: CPT | Mod: 25

## 2021-05-20 PROCEDURE — ? LIQUID NITROGEN

## 2021-05-20 PROCEDURE — 17110 DESTRUCTION B9 LES UP TO 14: CPT

## 2021-05-20 PROCEDURE — ? SUNSCREEN RECOMMENDATIONS

## 2021-05-20 ASSESSMENT — LOCATION DETAILED DESCRIPTION DERM
LOCATION DETAILED: RIGHT FOREHEAD
LOCATION DETAILED: LEFT SUPERIOR MEDIAL UPPER BACK
LOCATION DETAILED: LEFT SUPERIOR CENTRAL MALAR CHEEK
LOCATION DETAILED: RIGHT SUPERIOR MEDIAL MIDBACK
LOCATION DETAILED: RIGHT MEDIAL PROXIMAL PRETIBIAL REGION
LOCATION DETAILED: RIGHT CENTRAL MALAR CHEEK
LOCATION DETAILED: LEFT CENTRAL MALAR CHEEK
LOCATION DETAILED: RIGHT SUPERIOR LATERAL NECK
LOCATION DETAILED: EPIGASTRIC SKIN
LOCATION DETAILED: LEFT SUPERIOR LATERAL NECK
LOCATION DETAILED: LEFT POPLITEAL SKIN
LOCATION DETAILED: RIGHT LATERAL FOREHEAD
LOCATION DETAILED: LEFT INFERIOR ANTERIOR NECK
LOCATION DETAILED: RIGHT CLAVICULAR NECK
LOCATION DETAILED: LEFT FOREHEAD
LOCATION DETAILED: INFERIOR MID FOREHEAD
LOCATION DETAILED: LEFT INFERIOR LATERAL NECK
LOCATION DETAILED: LEFT LATERAL TEMPLE

## 2021-05-20 ASSESSMENT — LOCATION SIMPLE DESCRIPTION DERM
LOCATION SIMPLE: INFERIOR FOREHEAD
LOCATION SIMPLE: RIGHT FOREHEAD
LOCATION SIMPLE: LEFT POPLITEAL SKIN
LOCATION SIMPLE: LEFT TEMPLE
LOCATION SIMPLE: LEFT ANTERIOR NECK
LOCATION SIMPLE: LEFT CHEEK
LOCATION SIMPLE: RIGHT LOWER BACK
LOCATION SIMPLE: RIGHT CHEEK
LOCATION SIMPLE: RIGHT ANTERIOR NECK
LOCATION SIMPLE: ABDOMEN
LOCATION SIMPLE: LEFT UPPER BACK
LOCATION SIMPLE: LEFT FOREHEAD
LOCATION SIMPLE: RIGHT PRETIBIAL REGION

## 2021-05-20 ASSESSMENT — LOCATION ZONE DERM
LOCATION ZONE: NECK
LOCATION ZONE: TRUNK
LOCATION ZONE: LEG
LOCATION ZONE: FACE

## 2021-05-20 NOTE — PROCEDURE: LIQUID NITROGEN
Post-Care Instructions: I reviewed with the patient in detail post-care instructions. Patient is to wear sunprotection, and avoid picking at any of the treated lesions. Pt may apply Vaseline to crusted or scabbing areas.
Render Post-Care Instructions In Note?: yes
Add 52 Modifier (Optional): no
Detail Level: Detailed
Consent: The patient's consent was obtained including but not limited to risks of crusting, scabbing, blistering, scarring, darker or lighter pigmentary change, recurrence, incomplete removal and infection.
Medical Necessity Information: It is in your best interest to select a reason for this procedure from the list below. All of these items fulfill various CMS LCD requirements except the new and changing color options.
Number Of Freeze-Thaw Cycles: 3 freeze-thaw cycles
Medical Necessity Clause: This procedure was medically necessary because the lesions that were treated were:
Duration Of Freeze Thaw-Cycle (Seconds): 3
Detail Level: Zone
Total Number Of Lesions Treated: 5
Duration Of Freeze Thaw-Cycle (Seconds): 0

## 2021-05-27 ENCOUNTER — APPOINTMENT (RX ONLY)
Dept: URBAN - METROPOLITAN AREA CLINIC 20 | Facility: CLINIC | Age: 51
Setting detail: DERMATOLOGY
End: 2021-05-27

## 2021-05-27 DIAGNOSIS — Z41.9 ENCOUNTER FOR PROCEDURE FOR PURPOSES OTHER THAN REMEDYING HEALTH STATE, UNSPECIFIED: ICD-10-CM

## 2021-05-27 PROCEDURE — ? SKINPEN

## 2021-05-27 ASSESSMENT — LOCATION SIMPLE DESCRIPTION DERM
LOCATION SIMPLE: LEFT CHEEK
LOCATION SIMPLE: RIGHT CHEEK
LOCATION SIMPLE: LEFT FOREHEAD

## 2021-05-27 ASSESSMENT — LOCATION DETAILED DESCRIPTION DERM
LOCATION DETAILED: LEFT INFERIOR CENTRAL MALAR CHEEK
LOCATION DETAILED: LEFT INFERIOR MEDIAL FOREHEAD
LOCATION DETAILED: RIGHT INFERIOR CENTRAL MALAR CHEEK

## 2021-05-27 ASSESSMENT — LOCATION ZONE DERM: LOCATION ZONE: FACE

## 2021-05-27 NOTE — PROCEDURE: SKINPEN
Consent: Written consent obtained, risks reviewed including but not limited to pain, scarring, infection and incomplete improvement.  Patient understands the procedure is cosmetic in nature and will require out of pocket payment.
Speed (Optional): 3
Location #1: Forehead, Upper Lip
Depth In Mm: 0.75
Price (Use Numbers Only, No Special Characters Or $): 400
Location #2: cheeks, chin
Detail Level: Zone
Depth In Mm: 1
Location #3: Under Eyes, Nose
Post-Care Instructions: After the procedure, take precautions agains sun exposure. Do not apply sunscreen for 12 hours after the procedure. Do not apply make-up for 12 hours after the procedure. Avoid alcohol based toners for 10-14 days. After 2-3 days patients can return to their regular skin regimen.
Depth In Mm: 1.75

## 2021-06-07 DIAGNOSIS — E66.01 MORBID OBESITY (HCC): ICD-10-CM

## 2021-06-07 DIAGNOSIS — I10 ESSENTIAL HYPERTENSION: ICD-10-CM

## 2021-06-07 DIAGNOSIS — E11.8 CONTROLLED TYPE 2 DIABETES MELLITUS WITH COMPLICATION, WITHOUT LONG-TERM CURRENT USE OF INSULIN (HCC): ICD-10-CM

## 2021-06-07 RX ORDER — PHENTERMINE HYDROCHLORIDE 37.5 MG/1
CAPSULE ORAL
Qty: 30 CAPSULE | Refills: 0 | Status: SHIPPED | OUTPATIENT
Start: 2021-06-07 | End: 2021-07-07

## 2021-06-07 RX ORDER — CARVEDILOL 6.25 MG/1
TABLET ORAL
Qty: 180 TABLET | Refills: 3 | Status: SHIPPED | OUTPATIENT
Start: 2021-06-07 | End: 2022-03-16 | Stop reason: SDUPTHER

## 2021-06-23 DIAGNOSIS — E11.8 CONTROLLED TYPE 2 DIABETES MELLITUS WITH COMPLICATION, WITHOUT LONG-TERM CURRENT USE OF INSULIN (HCC): ICD-10-CM

## 2021-06-24 DIAGNOSIS — E11.8 CONTROLLED TYPE 2 DIABETES MELLITUS WITH COMPLICATION, WITHOUT LONG-TERM CURRENT USE OF INSULIN (HCC): ICD-10-CM

## 2021-06-24 NOTE — TELEPHONE ENCOUNTER
Received request via: Patient    Was the patient seen in the last year in this department? Yes  LOV 11/05/2020  Does the patient have an active prescription (recently filled or refills available) for medication(s) requested? No

## 2021-06-24 NOTE — TELEPHONE ENCOUNTER
Pt cannot fill 500 MG BID it is too soon, insurance will not allow. She is asking for 1000 MG daily for 15 days until she can  her original RX

## 2021-06-25 ENCOUNTER — APPOINTMENT (RX ONLY)
Dept: URBAN - METROPOLITAN AREA CLINIC 20 | Facility: CLINIC | Age: 51
Setting detail: DERMATOLOGY
End: 2021-06-25

## 2021-06-25 DIAGNOSIS — Z41.9 ENCOUNTER FOR PROCEDURE FOR PURPOSES OTHER THAN REMEDYING HEALTH STATE, UNSPECIFIED: ICD-10-CM

## 2021-06-25 PROCEDURE — ? ADDITIONAL NOTES

## 2021-06-25 ASSESSMENT — LOCATION DETAILED DESCRIPTION DERM: LOCATION DETAILED: LEFT INFERIOR CENTRAL MALAR CHEEK

## 2021-06-25 ASSESSMENT — LOCATION ZONE DERM: LOCATION ZONE: FACE

## 2021-06-25 ASSESSMENT — LOCATION SIMPLE DESCRIPTION DERM: LOCATION SIMPLE: LEFT CHEEK

## 2021-06-25 NOTE — PROCEDURE: ADDITIONAL NOTES
Render Risk Assessment In Note?: no
Additional Notes: Thought she was seeing Corina Morris for Botox under chin, got her scheduled for a facial KW
Detail Level: Detailed

## 2021-07-06 DIAGNOSIS — E11.9 CONTROLLED TYPE 2 DIABETES MELLITUS WITHOUT COMPLICATION, WITHOUT LONG-TERM CURRENT USE OF INSULIN (HCC): ICD-10-CM

## 2021-07-06 RX ORDER — PIOGLITAZONEHYDROCHLORIDE 15 MG/1
TABLET ORAL
Qty: 30 TABLET | Refills: 11 | Status: SHIPPED | OUTPATIENT
Start: 2021-07-06 | End: 2021-11-23 | Stop reason: SDUPTHER

## 2021-07-06 NOTE — TELEPHONE ENCOUNTER
Received request via: Pharmacy    Was the patient seen in the last year in this department? Yes  LOV 11/05/2020  Does the patient have an active prescription (recently filled or refills available) for medication(s) requested? No

## 2021-07-08 ENCOUNTER — RX ONLY (OUTPATIENT)
Age: 51
Setting detail: RX ONLY
End: 2021-07-08

## 2021-07-08 ENCOUNTER — APPOINTMENT (RX ONLY)
Dept: URBAN - METROPOLITAN AREA CLINIC 36 | Facility: CLINIC | Age: 51
Setting detail: DERMATOLOGY
End: 2021-07-08

## 2021-07-08 DIAGNOSIS — Z41.9 ENCOUNTER FOR PROCEDURE FOR PURPOSES OTHER THAN REMEDYING HEALTH STATE, UNSPECIFIED: ICD-10-CM

## 2021-07-08 PROCEDURE — ? COSMETIC CONSULTATION - KYBELLA

## 2021-07-08 RX ORDER — ACYCLOVIR 400 MG/1
TABLET ORAL
Qty: 21 | Refills: 11 | Status: ERX

## 2021-07-08 RX ORDER — MINOCYCLINE HYDROCHLORIDE 100 MG/1
CAPSULE ORAL
Qty: 60 | Refills: 1 | Status: CANCELLED
Stop reason: CLARIF

## 2021-07-08 NOTE — PROCEDURE: COSMETIC CONSULTATION - KYBELLA
# Of Treatments Recommended (Won't Render If 0): 3
Detail Level: Detailed
Vials Recommended (Won't Render If 0): 2

## 2021-07-08 NOTE — HPI: COSMETIC (KYBELLA)
Have You Had Kybella Injections Before?: has not had Kybella injections before
Additional History: Previous Mohs patient

## 2021-07-09 ENCOUNTER — APPOINTMENT (RX ONLY)
Dept: URBAN - METROPOLITAN AREA CLINIC 20 | Facility: CLINIC | Age: 51
Setting detail: DERMATOLOGY
End: 2021-07-09

## 2021-07-09 ENCOUNTER — APPOINTMENT (RX ONLY)
Dept: URBAN - METROPOLITAN AREA CLINIC 36 | Facility: CLINIC | Age: 51
Setting detail: DERMATOLOGY
End: 2021-07-09

## 2021-07-09 DIAGNOSIS — Z41.9 ENCOUNTER FOR PROCEDURE FOR PURPOSES OTHER THAN REMEDYING HEALTH STATE, UNSPECIFIED: ICD-10-CM

## 2021-07-09 PROCEDURE — ? FRAXEL

## 2021-07-09 ASSESSMENT — LOCATION SIMPLE DESCRIPTION DERM
LOCATION SIMPLE: RIGHT CHEEK
LOCATION SIMPLE: RIGHT FOREHEAD
LOCATION SIMPLE: INFERIOR FOREHEAD
LOCATION SIMPLE: RIGHT CHEEK

## 2021-07-09 ASSESSMENT — LOCATION DETAILED DESCRIPTION DERM
LOCATION DETAILED: RIGHT MEDIAL FOREHEAD
LOCATION DETAILED: INFERIOR MID FOREHEAD
LOCATION DETAILED: RIGHT INFERIOR CENTRAL MALAR CHEEK
LOCATION DETAILED: RIGHT INFERIOR CENTRAL MALAR CHEEK

## 2021-07-09 ASSESSMENT — LOCATION ZONE DERM
LOCATION ZONE: FACE
LOCATION ZONE: FACE

## 2021-07-09 NOTE — PROCEDURE: FRAXEL
Number Of Passes: 1
Topical Anesthesia Type: 23% lidocaine, 7% tetracaine
Wavelength: 1927nm
Depth In Microns (Use Numbers Only, No Special Characters Or $): 2402
Energy(Mj/Cm2): 15
Energy(Mj/Cm2): 20
Total Coverage: 35%
Location: full face except eyelids
Treatment Level: 4
Consent: Written consent obtained, risks reviewed including but not limited to pain and incomplete improvement.
Location: decolletage of the chest
Indication: resurfacing
Add Post-Care Below To The Note: No
Length Of Topical Anesthesia Application (Optional): 60 minutes
External Cooling Fan Speed: 5
Total Coverage: 25%
Post-Care Instructions: I reviewed with the patient in detail post-care instructions. Patient should avoid sun until area fully healed.
Location: neck
Price (Use Numbers Only, No Special Characters Or $): 965
Detail Level: Simple

## 2021-07-09 NOTE — PROCEDURE: FRAXEL
Number Of Passes: 4
Energy(Mj/Cm2): 1
Topical Anesthesia Type: 23% lidocaine, 7% tetracaine
Wavelength: 1927nm
Post-Care Instructions: I reviewed with the patient in detail post-care instructions. Patient should avoid sun until area fully healed.
Energy(Mj/Cm2): 20
Total Coverage: 25%
Depth In Microns (Use Numbers Only, No Special Characters Or $): 0531
Total Coverage: 35%
Location: neck
Detail Level: Simple
Location: full face except eyelids
Length Of Topical Anesthesia Application (Optional): 60 minutes
Energy(Mj/Cm2): 15
Consent: Written consent obtained, risks reviewed including but not limited to pain and incomplete improvement.
Indication: resurfacing
External Cooling Fan Speed: 5
Price (Use Numbers Only, No Special Characters Or $): 537
Add Post-Care Below To The Note: No
Location: decolletage of the chest

## 2021-09-13 RX ORDER — PAROXETINE HYDROCHLORIDE 20 MG/1
TABLET, FILM COATED ORAL
Qty: 90 TABLET | Refills: 4 | Status: SHIPPED | OUTPATIENT
Start: 2021-09-13 | End: 2021-12-20

## 2021-09-13 NOTE — TELEPHONE ENCOUNTER
Received request via: Pharmacy    Was the patient seen in the last year in this department? Yes  LOV: 11/05/2020  Does the patient have an active prescription (recently filled or refills available) for medication(s) requested? No

## 2021-10-12 ENCOUNTER — PATIENT MESSAGE (OUTPATIENT)
Dept: MEDICAL GROUP | Facility: LAB | Age: 51
End: 2021-10-12

## 2021-10-19 ENCOUNTER — OFFICE VISIT (OUTPATIENT)
Dept: MEDICAL GROUP | Facility: LAB | Age: 51
End: 2021-10-19
Payer: COMMERCIAL

## 2021-10-19 VITALS
BODY MASS INDEX: 48.82 KG/M2 | HEART RATE: 78 BPM | HEIGHT: 65 IN | SYSTOLIC BLOOD PRESSURE: 158 MMHG | WEIGHT: 293 LBS | DIASTOLIC BLOOD PRESSURE: 94 MMHG | TEMPERATURE: 97.3 F | RESPIRATION RATE: 16 BRPM | OXYGEN SATURATION: 93 %

## 2021-10-19 DIAGNOSIS — E11.9 CONTROLLED TYPE 2 DIABETES MELLITUS WITHOUT COMPLICATION, WITHOUT LONG-TERM CURRENT USE OF INSULIN (HCC): ICD-10-CM

## 2021-10-19 DIAGNOSIS — M17.0 OSTEOARTHRITIS OF BOTH KNEES, UNSPECIFIED OSTEOARTHRITIS TYPE: ICD-10-CM

## 2021-10-19 DIAGNOSIS — E66.01 OBESITY, MORBID, BMI 50 OR HIGHER (HCC): ICD-10-CM

## 2021-10-19 PROCEDURE — 99214 OFFICE O/P EST MOD 30 MIN: CPT | Performed by: INTERNAL MEDICINE

## 2021-10-19 RX ORDER — PHENTERMINE HYDROCHLORIDE 37.5 MG/1
37.5 CAPSULE ORAL EVERY MORNING
Qty: 30 CAPSULE | Refills: 0 | Status: SHIPPED | OUTPATIENT
Start: 2021-11-18 | End: 2021-11-11 | Stop reason: SDUPTHER

## 2021-10-19 RX ORDER — PHENTERMINE HYDROCHLORIDE 37.5 MG/1
37.5 CAPSULE ORAL EVERY MORNING
Qty: 30 CAPSULE | Refills: 0 | Status: SHIPPED | OUTPATIENT
Start: 2021-10-19 | End: 2021-10-19 | Stop reason: SDUPTHER

## 2021-10-19 ASSESSMENT — FIBROSIS 4 INDEX: FIB4 SCORE: 0.58

## 2021-10-19 ASSESSMENT — PATIENT HEALTH QUESTIONNAIRE - PHQ9: CLINICAL INTERPRETATION OF PHQ2 SCORE: 0

## 2021-10-20 NOTE — PROGRESS NOTES
CC: Logan Mahoney is a 51 y.o. female is suffering from   Chief Complaint   Patient presents with   • Obesity         SUBJECTIVE:  1. Obesity, morbid, BMI 50 or higher (HCC)  Pamela is here for follow-up suffers from severe morbid obesity, we have discussed her need for gastric bypass or other surgery as appropriate.  Patient will be started on phentermine.    2. Controlled type 2 diabetes mellitus without complication, without long-term current use of insulin (HCC)  History of controlled type 2 diabetes, no change in medical therapy    3. Osteoarthritis of both knees, unspecified osteoarthritis type  Moderate to severe osteoarthritis bilateral knees        Past social, family, history: , supportive   Social History     Tobacco Use   • Smoking status: Never Smoker   • Smokeless tobacco: Never Used   Vaping Use   • Vaping Use: Never used   Substance Use Topics   • Alcohol use: No   • Drug use: No         MEDICATIONS:    Current Outpatient Medications:   •  metFORMIN (GLUCOPHAGE) 500 MG Tab, Take 500 mg by mouth 2 times a day with meals., Disp: , Rfl:   •  [START ON 11/18/2021] phentermine 37.5 MG capsule, Take 1 Capsule by mouth every morning for 30 days. Refill #2 of #2., Disp: 30 Capsule, Rfl: 0  •  Misc. Devices Misc, Nocturnal desaturation study., Disp: 1 Each, Rfl: 0  •  PARoxetine (PAXIL) 20 MG Tab, TAKE 1 TABLET BY MOUTH EVERY DAY, Disp: 90 Tablet, Rfl: 4  •  pioglitazone (ACTOS) 15 MG Tab, TAKE 1 TABLET BY MOUTH EVERY DAY, Disp: 30 tablet, Rfl: 11  •  carvedilol (COREG) 6.25 MG Tab, TAKE 1 TABLET BY MOUTH TWICE DAILY WITH MEALS, Disp: 180 tablet, Rfl: 3  •  vitamin D (CHOLECALCIFEROL) 1000 UNIT Tab, Take 1,000 Units by mouth every day., Disp: , Rfl:   •  doxycycline (VIBRAMYCIN) 100 MG Tab, Take 1 Tab by mouth 2 times a day. (Patient not taking: Reported on 5/22/2020), Disp: 14 Tab, Rfl: 0  •  methylPREDNISolone (MEDROL DOSEPAK) 4 MG Tablet Therapy Pack, Follow schedule on package  instructions. (Patient not taking: Reported on 2/21/2020), Disp: 21 Tab, Rfl: 0  •  Blood Glucose Monitoring Suppl (ONE TOUCH ULTRA 2) w/Device Kit, USE FOR BLOOD SUGAR TEST UTD, Disp: , Rfl: 0  •  ONE TOUCH ULTRA TEST strip, USE ONE STRIP FOR BLOOD SUGAR TEST D AND PRN, Disp: , Rfl: 3  •  ONETOUCH DELICA LANCETS 33G Misc, USE ONE LANCET FOR BLOOD SUGAR TEST D AND PRN, Disp: , Rfl: 3  •  ALBUTEROL SULFATE HFA INH, INHALE 2 PUFFS PO Q 6 H PRN FOR SOB, Disp: , Rfl: 0  •  acyclovir (ZOVIRAX) 400 MG tablet, TK ONE T PO TID WF AND FULL GLASS OF WATER FOR 7 DAYS, Disp: , Rfl: 1  •  NON SPECIFIED, Glucometer and Test Strips and related supplies as covered by insurance.  QS for 90 days check qd and prn, Disp: 150 Each, Rfl: 3  •  nystatin-triamcinalone (MYCOLOG) 295094-4.1 UNIT/GM-% Ointment, Apply 1 Units to affected area(s) 2 times a day., Disp: 1 Tube, Rfl: 0  •  cetirizine (ZYRTEC) 10 MG Tab, Take 1 Tab by mouth every day. (Patient not taking: Reported on 6/25/2020), Disp: 30 Tab, Rfl: 0  •  fluticasone (FLONASE) 50 MCG/ACT nasal spray, Spray 2 Sprays in nose every day. (Patient not taking: Reported on 6/25/2020), Disp: 16 g, Rfl: 0  •  magnesium oxide (MAG-OX) 400 MG Tab, Take 400 mg by mouth every day., Disp: , Rfl:   •  calcium carbonate (OS-JUVENAL 500) 500 MG Tab, Take 1,000 mg by mouth 2 times a day, with meals., Disp: , Rfl:   •  Cyanocobalamin (VITAMIN B-12) 3000 MCG SL Tab, Place  under tongue., Disp: , Rfl:   •  Omega 3-6-9 Fatty Acids (TRIPLE OMEGA-3-6-9) Cap, Take  by mouth., Disp: , Rfl:   •  PARoxetine (PAXIL) 20 MG Tab, Take 1 Tab by mouth every evening., Disp: 90 Tab, Rfl: 3  •  NON SPECIFIED, Nocturnal desaturation study., Disp: 1 Each, Rfl: 0  •  multivitamin (THERAGRAN) Tab, Take 1 Tab by mouth every day., Disp: , Rfl:   •  vitamin D, Ergocalciferol, (DRISDOL) 83865 UNIT CAPS capsule, Take 1,000 Units by mouth every evening., Disp: , Rfl:     PROBLEMS:  Patient Active Problem List    Diagnosis Date Noted  "  • DM II (diabetes mellitus, type II), controlled ([facility]) 06/12/2019   • Vitamin D deficiency disease 12/02/2016   • Morbid obesity ([facility]) 05/31/2016   • S/P gastric surgery 04/18/2016   • Benign hypertension 07/20/2012   • Preop cardiovascular exam 07/20/2012   • Nonspecific abnormal electrocardiogram (ECG) (EKG) 07/20/2012       REVIEW OF SYSTEMS:  Gen.:  No Nausea, Vomiting, fever, Chills.  Heart: No chest pain.  Lungs:  No shortness of Breath.  Psychological: Rinku unusual Anxiety depression     PHYSICAL EXAM   Constitutional: Alert, cooperative, not in acute distress.  Cardiovascular:  Rate Rhythm is regular without murmurs rubs clicks.     Thorax & Lungs: Clear to auscultation, no wheezing, rhonchi, or rales  HENT: Normocephalic, Atraumatic.  Eyes: PERRLA, EOMI, Conjunctiva normal.   Neck: Trachia is midline no swelling of the thyroid.   Lymphatic: No lymphadenopathy noted.   Neurologic: Alert & oriented x 3, cranial nerves II through XII are intact, Normal motor function, Normal sensory function, No focal deficits noted.   Psychiatric: Affect normal, Judgment normal, Mood normal.     VITAL SIGNS:/94   Pulse 78   Temp 36.3 °C (97.3 °F) (Temporal)   Resp 16   Ht 1.638 m (5' 4.5\")   Wt (!) 140 kg (308 lb 3.2 oz)   LMP 03/01/2016   SpO2 93%   BMI 52.09 kg/m²     Labs: Reviewed    Assessment:                                                     Plan:    1. Obesity, morbid, BMI 50 or higher ([facility])  Patient is to start phentermine, warned regarding side effects is to stop medication should she experience any cardiac arrhythmia or intolerance.  Patient is at risk for obstructive sleep apnea nocturnal desaturation study ordered.  - phentermine 37.5 MG capsule; Take 1 Capsule by mouth every morning for 30 days. Refill #2 of #2.  Dispense: 30 Capsule; Refill: 0  - Misc. Devices Misc; Nocturnal desaturation study.  Dispense: 1 Each; Refill: 0  - Comp Metabolic Panel; Future  - CBC WITH DIFFERENTIAL; Future  - " TSH; Future  - Lipid Profile; Future    2. Controlled type 2 diabetes mellitus without complication, without long-term current use of insulin (HCC)  Recheck hemoglobin A1c  - Comp Metabolic Panel; Future  - CBC WITH DIFFERENTIAL; Future  - FREE THYROXINE; Future  - HEMOGLOBIN A1C; Future  - Lipid Profile; Future    3. Osteoarthritis of both knees, unspecified osteoarthritis type  Osteoarthritis of the knees  - Lipid Profile; Future

## 2021-10-25 ENCOUNTER — OFFICE VISIT (OUTPATIENT)
Dept: URGENT CARE | Facility: PHYSICIAN GROUP | Age: 51
End: 2021-10-25
Payer: COMMERCIAL

## 2021-10-25 VITALS
HEART RATE: 68 BPM | TEMPERATURE: 98.4 F | OXYGEN SATURATION: 94 % | SYSTOLIC BLOOD PRESSURE: 164 MMHG | RESPIRATION RATE: 16 BRPM | HEIGHT: 65 IN | BODY MASS INDEX: 48.82 KG/M2 | WEIGHT: 293 LBS | DIASTOLIC BLOOD PRESSURE: 74 MMHG

## 2021-10-25 DIAGNOSIS — H10.32 ACUTE CONJUNCTIVITIS OF LEFT EYE, UNSPECIFIED ACUTE CONJUNCTIVITIS TYPE: ICD-10-CM

## 2021-10-25 DIAGNOSIS — R03.0 ELEVATED BLOOD PRESSURE READING: ICD-10-CM

## 2021-10-25 PROCEDURE — 99214 OFFICE O/P EST MOD 30 MIN: CPT | Performed by: PHYSICIAN ASSISTANT

## 2021-10-25 RX ORDER — POLYMYXIN B SULFATE AND TRIMETHOPRIM 1; 10000 MG/ML; [USP'U]/ML
1 SOLUTION OPHTHALMIC 4 TIMES DAILY
Qty: 10 ML | Refills: 0 | Status: SHIPPED | OUTPATIENT
Start: 2021-10-25 | End: 2022-11-11

## 2021-10-25 ASSESSMENT — ENCOUNTER SYMPTOMS
EYE REDNESS: 1
PHOTOPHOBIA: 0
DOUBLE VISION: 0
BLURRED VISION: 0
EYE PAIN: 0
EYE DISCHARGE: 1

## 2021-10-25 ASSESSMENT — FIBROSIS 4 INDEX: FIB4 SCORE: 0.58

## 2021-10-25 ASSESSMENT — VISUAL ACUITY: OU: 1

## 2021-10-25 NOTE — PROGRESS NOTES
Subjective:   Logan Mahoney is a 51 y.o. female who presents for Pink Eye (L eye redness, itchiness, discharge, crusty, onset this morning)        Patient presents for evaluation of left eye redness, itchiness, and crusting that she noticed first thing this morning.  Symptoms have been gradually worsening.  Discharge is thick and yellowish.  She points to the medial canthus as the site of this discharge.  Vision is normal and she does not report any pain at rest or with movement of the eye.  No remote or recent trauma reported.  No nausea, vomiting, fevers, chills.  She is not using any medications for symptoms.    Review of Systems   Eyes: Positive for discharge and redness. Negative for blurred vision, double vision, photophobia and pain.       PMH:  has a past medical history of Anemia, Anesthesia, Arthritis, Benign hypertension (7/20/2012), Cancer ([facility]) (2019), DM II (diabetes mellitus, type II), controlled ([facility]) (6/12/2019), Heart burn, Morbid obesity ([facility]) (5/31/2016), Nonspecific abnormal electrocardiogram (ECG) (EKG) (7/20/2012), Preop cardiovascular exam (7/20/2012), S/P gastric surgery (4/18/2016), and Vitamin D deficiency disease (12/2/2016).  MEDS:   Current Outpatient Medications:   •  polymixin-trimethoprim (POLYTRIM) 22667-9.1 UNIT/ML-% Solution, Administer 1 Drop into the left eye 4 times a day., Disp: 10 mL, Rfl: 0  •  metFORMIN (GLUCOPHAGE) 500 MG Tab, Take 500 mg by mouth 2 times a day with meals., Disp: , Rfl:   •  [START ON 11/18/2021] phentermine 37.5 MG capsule, Take 1 Capsule by mouth every morning for 30 days. Refill #2 of #2., Disp: 30 Capsule, Rfl: 0  •  Misc. Devices Misc, Nocturnal desaturation study., Disp: 1 Each, Rfl: 0  •  PARoxetine (PAXIL) 20 MG Tab, TAKE 1 TABLET BY MOUTH EVERY DAY, Disp: 90 Tablet, Rfl: 4  •  pioglitazone (ACTOS) 15 MG Tab, TAKE 1 TABLET BY MOUTH EVERY DAY, Disp: 30 tablet, Rfl: 11  •  carvedilol (COREG) 6.25 MG Tab, TAKE 1 TABLET BY MOUTH TWICE DAILY  "WITH MEALS, Disp: 180 tablet, Rfl: 3  •  Blood Glucose Monitoring Suppl (ONE TOUCH ULTRA 2) w/Device Kit, USE FOR BLOOD SUGAR TEST UTD, Disp: , Rfl: 0  •  ONE TOUCH ULTRA TEST strip, USE ONE STRIP FOR BLOOD SUGAR TEST D AND PRN, Disp: , Rfl: 3  •  ONETOUCH DELICA LANCETS 33G Misc, USE ONE LANCET FOR BLOOD SUGAR TEST D AND PRN, Disp: , Rfl: 3  •  NON SPECIFIED, Glucometer and Test Strips and related supplies as covered by insurance.  QS for 90 days check qd and prn, Disp: 150 Each, Rfl: 3  •  vitamin D (CHOLECALCIFEROL) 1000 UNIT Tab, Take 1,000 Units by mouth every day., Disp: , Rfl:   ALLERGIES:   Allergies   Allergen Reactions   • Ceclor [Cefaclor] Vomiting   • Cinnamon Vomiting   • Eggs Vomiting   • Latex Hives   • Peppers [Pepper-Bell Food Allergy] Vomiting   • Pineapple Hives and Nausea     Only skin     SURGHX:   Past Surgical History:   Procedure Laterality Date   • LAPAROSCOPIC BAND REMOVAL  11/5/2018    Procedure: LAPAROSCOPIC BAND REMOVAL- GASTRIC BAND and Port;  Surgeon: John H Ganser, M.D.;  Location: SURGERY [facility];  Service: General   • OTHER ABDOMINAL SURGERY  2013    gastric band w/port   • KNEE ARTHROSCOPY  1990    Arthroscopy, Knee R   • TONSILLECTOMY       SOCHX:  reports that she has never smoked. She has never used smokeless tobacco. She reports that she does not drink alcohol and does not use drugs.  FH: Family history was reviewed, no pertinent findings to report   Objective:   BP (!) 164/74 (BP Location: Left arm, Patient Position: Sitting, BP Cuff Size: Adult)   Pulse 68   Temp 36.9 °C (98.4 °F) (Temporal)   Resp 16   Ht 1.638 m (5' 4.5\")   Wt (!) 138 kg (304 lb 6.4 oz)   LMP 03/01/2016   SpO2 94%   BMI 51.44 kg/m²   Physical Exam  Vitals reviewed.   Constitutional:       General: She is not in acute distress.     Appearance: Normal appearance. She is well-developed. She is not toxic-appearing.   HENT:      Head: Normocephalic and atraumatic.      Right Ear: External ear " normal.      Left Ear: External ear normal.      Nose: Nose normal.   Eyes:      General: Vision grossly intact. Gaze aligned appropriately.      Comments: PERRL.  EOMI, bilaterally.  No nystagmus or ocular palsy.  No pain with extraocular movements.  Mild left-sided conjunctival injection with scant whitish discharge at left medial canthus.  No erythema or discharge of the right eye.  Anterior chambers are clear and non-bulging bilaterally.  No evidence of corneal abrasion or foreign body on gross examination.   Cardiovascular:      Rate and Rhythm: Normal rate and regular rhythm.   Pulmonary:      Effort: Pulmonary effort is normal. No respiratory distress.      Breath sounds: No stridor.   Musculoskeletal:      Cervical back: Neck supple.   Skin:     General: Skin is warm and dry.      Capillary Refill: Capillary refill takes less than 2 seconds.   Neurological:      Mental Status: She is alert and oriented to person, place, and time.      Comments: CN2-12 grossly intact   Psychiatric:         Speech: Speech normal.         Behavior: Behavior normal.           Assessment/Plan:   1. Acute conjunctivitis of left eye, unspecified acute conjunctivitis type  - polymixin-trimethoprim (POLYTRIM) 18841-4.1 UNIT/ML-% Solution; Administer 1 Drop into the left eye 4 times a day.  Dispense: 10 mL; Refill: 0    2. Elevated blood pressure reading    1. Discard eye make up.  Launder pillow cases.  Warm we compresses several times a day.  Avoid touching eyes and good hand hygiene.    2.  Patient advised that her blood pressure is elevated today.  She forgot to take her carvedilol this morning.  She will take when she gets home and continue to follow-up with PCP for further management.    Differential diagnosis, natural history, supportive care, and indications for immediate follow-up discussed.

## 2021-10-25 NOTE — PATIENT INSTRUCTIONS
"Conjunctivitis  Conjunctivitis is commonly called \"pink eye.\" Conjunctivitis can be caused by bacterial or viral infection, allergies, or injuries. There is usually redness of the lining of the eye, itching, discomfort, and sometimes discharge. There may be deposits of matter along the eyelids. A viral infection usually causes a watery discharge, while a bacterial infection causes a yellowish, thick discharge. Pink eye is very contagious and spreads by direct contact.  You may be given antibiotic eyedrops as part of your treatment. Before using your eye medicine, remove all drainage from the eye by washing gently with warm water and cotton balls. Continue to use the medication until you have awakened 2 mornings in a row without discharge from the eye. Do not rub your eye. This increases the irritation and helps spread infection. Use separate towels from other household members. Wash your hands with soap and water before and after touching your eyes. Use cold compresses to reduce pain and sunglasses to relieve irritation from light. Do not wear contact lenses or wear eye makeup until the infection is gone.  SEEK MEDICAL CARE IF:   · Your symptoms are not better after 3 days of treatment.  · You have increased pain or trouble seeing.  · The outer eyelids become very red or swollen.  Document Released: 01/25/2006 Document Revised: 03/11/2013 Document Reviewed: 12/18/2006  [facility]® Patient Information ©2014 [employer].    "

## 2021-11-01 ENCOUNTER — TELEPHONE (OUTPATIENT)
Dept: MEDICAL GROUP | Facility: LAB | Age: 51
End: 2021-11-01

## 2021-11-01 NOTE — TELEPHONE ENCOUNTER
Caller Name: Logan   Call Back Number: [phone]    How would the patient prefer to be contacted with a response: Phone call OK to leave a detailed message    Pt is having COVID symptoms and is requesting an order for a rapid COVID test so she can return to work.  I called back and left a voicemail stating that she'd need to make an appt with a provider for a swab or go to urgent care.

## 2021-11-02 ENCOUNTER — OFFICE VISIT (OUTPATIENT)
Dept: MEDICAL GROUP | Facility: LAB | Age: 51
End: 2021-11-02
Payer: COMMERCIAL

## 2021-11-02 ENCOUNTER — HOSPITAL ENCOUNTER (OUTPATIENT)
Facility: MEDICAL CENTER | Age: 51
End: 2021-11-02
Attending: NURSE PRACTITIONER
Payer: COMMERCIAL

## 2021-11-02 VITALS
HEART RATE: 76 BPM | WEIGHT: 293 LBS | RESPIRATION RATE: 14 BRPM | OXYGEN SATURATION: 93 % | TEMPERATURE: 98.2 F | HEIGHT: 65 IN | DIASTOLIC BLOOD PRESSURE: 66 MMHG | SYSTOLIC BLOOD PRESSURE: 144 MMHG | BODY MASS INDEX: 48.82 KG/M2

## 2021-11-02 DIAGNOSIS — J06.9 UPPER RESPIRATORY TRACT INFECTION, UNSPECIFIED TYPE: ICD-10-CM

## 2021-11-02 LAB — COVID ORDER STATUS COVID19: NORMAL

## 2021-11-02 PROCEDURE — U0003 INFECTIOUS AGENT DETECTION BY NUCLEIC ACID (DNA OR RNA); SEVERE ACUTE RESPIRATORY SYNDROME CORONAVIRUS 2 (SARS-COV-2) (CORONAVIRUS DISEASE [COVID-19]), AMPLIFIED PROBE TECHNIQUE, MAKING USE OF HIGH THROUGHPUT TECHNOLOGIES AS DESCRIBED BY CMS-2020-01-R: HCPCS

## 2021-11-02 PROCEDURE — 99213 OFFICE O/P EST LOW 20 MIN: CPT | Performed by: NURSE PRACTITIONER

## 2021-11-02 PROCEDURE — U0005 INFEC AGEN DETEC AMPLI PROBE: HCPCS

## 2021-11-02 ASSESSMENT — FIBROSIS 4 INDEX: FIB4 SCORE: 0.58

## 2021-11-02 NOTE — LETTER
November 2, 2021         Patient: Logan Mahoney   YOB: 1970   Date of Visit: 11/2/2021           To Whom it May Concern:    Logan Mahoney was seen in my clinic on 11/2/2021. She may return to work on 11/05/2021.    If you have any questions or concerns, please don't hesitate to call.        Sincerely,           ARLENE [email or web address].  Electronically Signed

## 2021-11-02 NOTE — PROGRESS NOTES
Subjective:     CC: The encounter diagnosis was Upper respiratory tract infection, unspecified type.    HPI:   Logan presents today with the following:    Upper respiratory infection  Onset Saturday. Current symptoms include headache, congestion, sinus pain/pressure, sore throat, ear fullness.   Denies fever, chills, loss of taste/smell, diarrhea, N/V.  Patient was vaccinated against covid.  No known sick contacts.   Has been taking Advil cold/sinus with relief.   She is here today because she needs a covid test to go back to work.    Current Outpatient Medications Ordered in Epic   Medication Sig Dispense Refill   • polymixin-trimethoprim (POLYTRIM) 89789-8.1 UNIT/ML-% Solution Administer 1 Drop into the left eye 4 times a day. 10 mL 0   • metFORMIN (GLUCOPHAGE) 500 MG Tab Take 500 mg by mouth 2 times a day with meals.     • [START ON 11/18/2021] phentermine 37.5 MG capsule Take 1 Capsule by mouth every morning for 30 days. Refill #2 of #2. 30 Capsule 0   • Misc. Devices Misc Nocturnal desaturation study. 1 Each 0   • PARoxetine (PAXIL) 20 MG Tab TAKE 1 TABLET BY MOUTH EVERY DAY 90 Tablet 4   • pioglitazone (ACTOS) 15 MG Tab TAKE 1 TABLET BY MOUTH EVERY DAY 30 tablet 11   • carvedilol (COREG) 6.25 MG Tab TAKE 1 TABLET BY MOUTH TWICE DAILY WITH MEALS 180 tablet 3   • Blood Glucose Monitoring Suppl (ONE TOUCH ULTRA 2) w/Device Kit USE FOR BLOOD SUGAR TEST UTD  0   • ONE TOUCH ULTRA TEST strip USE ONE STRIP FOR BLOOD SUGAR TEST D AND PRN  3   • ONETOUCH DELICA LANCETS 33G Misc USE ONE LANCET FOR BLOOD SUGAR TEST D AND PRN  3   • NON SPECIFIED Glucometer and Test Strips and related supplies as covered by insurance.  QS for 90 days check qd and prn 150 Each 3   • vitamin D (CHOLECALCIFEROL) 1000 UNIT Tab Take 1,000 Units by mouth every day.       No current [facility]-ordered facility-administered medications on file.     ROS:   Gen: no fevers/chills, no changes in weight  Eyes: no changes in vision  ENT: no sore throat, no  "hearing loss, no bloody nose  Pulm: no sob, no cough  CV: no chest pain, no palpitations  GI: no nausea/vomiting, no diarrhea  : no dysuria  MSk: no myalgias  Skin: no rash  Neuro: no headaches, no numbness/tingling  Heme/Lymph: no easy bruising        - NOTE: All other systems reviewed and are negative, except as in HPI.    Objective:     Exam: /66 (BP Location: Right arm, Patient Position: Sitting, BP Cuff Size: Large adult)   Pulse 76   Temp 36.8 °C (98.2 °F)   Resp 14   Ht 1.638 m (5' 4.5\")   Wt (!) 138 kg (304 lb 6.4 oz)   SpO2 93%  Body mass index is 51.44 kg/m².    Constitutional: Alert, no distress, plus 3 vital signs  Skin:  Warm, dry, no rashes invisible areas  Eye: Equal, round and reactive, conjunctiva clear  ENMT: Bilateral tympanic membranes are retracted but translucent without erythema or perforation. Positive bilateral maxillary sinus tenderness. Oropharynx is slightly injected without exudate. No tonsillar enlargement.    Neck: Mild anterior cervical, nontender lymphadenopathy  Respiratory: Unlabored respiration, lungs clear to auscultation, no wheezes, no rhonchi  Cardiovascular: Normal rate and rhythm  Psych: Alert, pleasant, well-groomed, normal affect    Assessment & Plan:     51 y.o. female with the following -     1. Upper respiratory tract infection, unspecified type  Covid swab sent out for testing, will contact patient with results.  Notified patient it could take anywhere from 2 to 7 days for results to come back.  Discussed worsening symptoms or new symptoms and ER follow-up if needed.  Patient to follow employer (if employed), county, local, state, national guidelines for protection.  Patient to contact the county for recommendations and written notes about clearing from Covid and returning to work. Patient can take over-the-counter Tylenol for pain, fever, discomfort, and other over-the-counter medications for symptom relief.  All questions answered.  - SARS-CoV-2 PCR (24 " hour In-House): Collect NP swab in VTM; Future    Return if symptoms worsen or fail to improve.    Please note that this dictation was created using voice recognition software. I have made every reasonable attempt to correct obvious errors, but I expect that there are errors of grammar and possibly content that I did not discover before finalizing the note.

## 2021-11-03 LAB
SARS-COV-2 RNA RESP QL NAA+PROBE: NOTDETECTED
SPECIMEN SOURCE: NORMAL

## 2021-11-11 ENCOUNTER — OFFICE VISIT (OUTPATIENT)
Dept: MEDICAL GROUP | Facility: LAB | Age: 51
End: 2021-11-11
Payer: COMMERCIAL

## 2021-11-11 VITALS
TEMPERATURE: 96.8 F | HEIGHT: 65 IN | OXYGEN SATURATION: 92 % | BODY MASS INDEX: 48.82 KG/M2 | HEART RATE: 75 BPM | DIASTOLIC BLOOD PRESSURE: 86 MMHG | WEIGHT: 293 LBS | SYSTOLIC BLOOD PRESSURE: 136 MMHG

## 2021-11-11 DIAGNOSIS — E66.01 OBESITY, MORBID, BMI 50 OR HIGHER (HCC): ICD-10-CM

## 2021-11-11 PROCEDURE — 99213 OFFICE O/P EST LOW 20 MIN: CPT | Performed by: INTERNAL MEDICINE

## 2021-11-11 RX ORDER — PHENTERMINE HYDROCHLORIDE 37.5 MG/1
37.5 CAPSULE ORAL EVERY MORNING
Qty: 30 CAPSULE | Refills: 0 | Status: SHIPPED | OUTPATIENT
Start: 2021-12-18 | End: 2021-11-11 | Stop reason: SDUPTHER

## 2021-11-11 RX ORDER — PHENTERMINE HYDROCHLORIDE 37.5 MG/1
37.5 CAPSULE ORAL EVERY MORNING
Qty: 30 CAPSULE | Refills: 0 | Status: SHIPPED | OUTPATIENT
Start: 2022-01-18 | End: 2022-02-17

## 2021-11-11 ASSESSMENT — FIBROSIS 4 INDEX: FIB4 SCORE: 0.58

## 2021-11-11 NOTE — LETTER
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    If you have any questions or concerns, please don't hesitate to call.        Sincerely,          Manuel Barbosa D.O.  Board Certified General Internal Medicine.      [facility] - [address] [id number] [phone] (Phone)  [phone] (Fax)

## 2021-11-12 NOTE — PROGRESS NOTES
CC: Logan Mahoney is a 51 y.o. female is suffering from   Chief Complaint   Patient presents with   • Follow-Up     paperwork for gastric bypass and note         SUBJECTIVE:  1. Obesity, morbid, BMI 50 or higher (HCC)  Pamela is here for follow-up, continues to lose weight very gradually, is on phentermine, paperwork for gastric bypass was completed note for work was also dictated        Past social, family, history:   Social History     Tobacco Use   • Smoking status: Never Smoker   • Smokeless tobacco: Never Used   Vaping Use   • Vaping Use: Never used   Substance Use Topics   • Alcohol use: No   • Drug use: No         MEDICATIONS:    Current Outpatient Medications:   •  [START ON 1/18/2022] phentermine 37.5 MG capsule, Take 1 Capsule by mouth every morning for 30 days. Refill #2 of #2., Disp: 30 Capsule, Rfl: 0  •  polymixin-trimethoprim (POLYTRIM) 88405-3.1 UNIT/ML-% Solution, Administer 1 Drop into the left eye 4 times a day., Disp: 10 mL, Rfl: 0  •  metFORMIN (GLUCOPHAGE) 500 MG Tab, Take 500 mg by mouth 2 times a day with meals., Disp: , Rfl:   •  Misc. Devices Misc, Nocturnal desaturation study., Disp: 1 Each, Rfl: 0  •  PARoxetine (PAXIL) 20 MG Tab, TAKE 1 TABLET BY MOUTH EVERY DAY, Disp: 90 Tablet, Rfl: 4  •  pioglitazone (ACTOS) 15 MG Tab, TAKE 1 TABLET BY MOUTH EVERY DAY, Disp: 30 tablet, Rfl: 11  •  carvedilol (COREG) 6.25 MG Tab, TAKE 1 TABLET BY MOUTH TWICE DAILY WITH MEALS, Disp: 180 tablet, Rfl: 3  •  Blood Glucose Monitoring Suppl (ONE TOUCH ULTRA 2) w/Device Kit, USE FOR BLOOD SUGAR TEST UTD, Disp: , Rfl: 0  •  ONE TOUCH ULTRA TEST strip, USE ONE STRIP FOR BLOOD SUGAR TEST D AND PRN, Disp: , Rfl: 3  •  ONETOUCH DELICA LANCETS 33G Misc, USE ONE LANCET FOR BLOOD SUGAR TEST D AND PRN, Disp: , Rfl: 3  •  NON SPECIFIED, Glucometer and Test Strips and related supplies as covered by insurance.  QS for 90 days check qd and prn, Disp: 150 Each, Rfl: 3  •  vitamin D (CHOLECALCIFEROL) 1000  "UNIT Tab, Take 1,000 Units by mouth every day., Disp: , Rfl:     PROBLEMS:  Patient Active Problem List    Diagnosis Date Noted   • DM II (diabetes mellitus, type II), controlled ([facility]) 06/12/2019   • Vitamin D deficiency disease 12/02/2016   • Morbid obesity ([facility]) 05/31/2016   • S/P gastric surgery 04/18/2016   • Benign hypertension 07/20/2012   • Preop cardiovascular exam 07/20/2012   • Nonspecific abnormal electrocardiogram (ECG) (EKG) 07/20/2012       REVIEW OF SYSTEMS:  Gen.:  No Nausea, Vomiting, fever, Chills.  Heart: No chest pain.  Lungs:  No shortness of Breath.  Psychological: Rinku unusual Anxiety depression     PHYSICAL EXAM   Constitutional: Alert, cooperative, not in acute distress.  Cardiovascular:  Rate Rhythm is regular without murmurs rubs clicks.     Thorax & Lungs: Clear to auscultation, no wheezing, rhonchi, or rales  HENT: Normocephalic, Atraumatic.  Eyes: PERRLA, EOMI, Conjunctiva normal.   Neck: Trachia is midline no swelling of the thyroid.   Neurologic: Alert & oriented x 3, cranial nerves II through XII are intact, Normal motor function, Normal sensory function, No focal deficits noted.   Psychiatric: Affect normal, Judgment normal, Mood normal.     VITAL SIGNS:/86 (BP Location: Left arm, Patient Position: Sitting, BP Cuff Size: Adult)   Pulse 75   Temp 36 °C (96.8 °F) (Temporal)   Ht 1.638 m (5' 4.5\")   Wt (!) 136 kg (299 lb 9.6 oz)   LMP 03/01/2016   SpO2 92%   BMI 50.63 kg/m²     Labs: Reviewed    Assessment:                                                     Plan:    1. Obesity, morbid, BMI 50 or higher ([facility])  Continue phentermine, patient continues to show modest improvement in weight loss.  - phentermine 37.5 MG capsule; Take 1 Capsule by mouth every morning for 30 days. Refill #2 of #2.  Dispense: 30 Capsule; Refill: 0        "

## 2021-11-23 DIAGNOSIS — E11.9 CONTROLLED TYPE 2 DIABETES MELLITUS WITHOUT COMPLICATION, WITHOUT LONG-TERM CURRENT USE OF INSULIN (HCC): ICD-10-CM

## 2021-11-23 RX ORDER — PIOGLITAZONEHYDROCHLORIDE 15 MG/1
15 TABLET ORAL
Qty: 30 TABLET | Refills: 11 | Status: SHIPPED | OUTPATIENT
Start: 2021-11-23 | End: 2022-03-21 | Stop reason: SDUPTHER

## 2021-12-08 ENCOUNTER — TELEMEDICINE (OUTPATIENT)
Dept: MEDICAL GROUP | Facility: LAB | Age: 51
End: 2021-12-08
Payer: COMMERCIAL

## 2021-12-08 VITALS — BODY MASS INDEX: 48.82 KG/M2 | WEIGHT: 293 LBS | HEIGHT: 65 IN

## 2021-12-08 DIAGNOSIS — E66.01 MORBID OBESITY (HCC): ICD-10-CM

## 2021-12-08 PROCEDURE — 99441 PR PHYSICIAN TELEPHONE EVALUATION 5-10 MIN: CPT | Mod: 95 | Performed by: INTERNAL MEDICINE

## 2021-12-08 RX ORDER — ORLISTAT 120 MG/1
120 CAPSULE ORAL
Qty: 90 CAPSULE | Refills: 2 | Status: SHIPPED | OUTPATIENT
Start: 2021-12-08 | End: 2021-12-13

## 2021-12-08 ASSESSMENT — FIBROSIS 4 INDEX: FIB4 SCORE: 0.58

## 2021-12-09 NOTE — PROGRESS NOTES
Telephone Appointment Visit   As a means of avoiding spread of COVID-19, this visit is being conducted by telephone. This telephone visit was initiated by the patient and they verbally consented.    Time at start of call: 4:50 PM    Reason for Call:  Medication Follow-up    HPI:    I talked with Pamela today she states that she is unable to tolerate phentermine because of it causing problems with insomnia, I recommend that we try Xenical to see if that is effective for weight loss.  Patient is to follow-up with Dr. Meyers    Labs / Images Reviewed:   None    Assessment and Plan:     1. Morbid obesity (HCC)  - orlistat (XENICAL) 120 MG capsule; Take 1 Capsule by mouth 3 times a day with meals.  Dispense: 90 Capsule; Refill: 2      Follow-up: 6 weeks    Time at end of call: 4:59 PM  Total Time Spent: 5-10 minutes    Manuel Barbosa D.O.

## 2021-12-13 ENCOUNTER — TELEPHONE (OUTPATIENT)
Dept: MEDICAL GROUP | Facility: LAB | Age: 51
End: 2021-12-13

## 2021-12-13 DIAGNOSIS — E66.01 MORBID OBESITY (HCC): ICD-10-CM

## 2021-12-13 DIAGNOSIS — Z98.890 S/P GASTRIC SURGERY: ICD-10-CM

## 2021-12-13 NOTE — TELEPHONE ENCOUNTER
Logan called and stated that the medication you prescribed (orlistat) is too expensive with her insurance so she would like to know if there is another alternative.

## 2021-12-14 NOTE — TELEPHONE ENCOUNTER
Verna:  Please call Pamela, I have written out for bupropion/naltrexone prescription this is been sent to her [facility] pharmacy.   Regards, Manuel Barbosa, DO

## 2021-12-19 ENCOUNTER — PATIENT MESSAGE (OUTPATIENT)
Dept: MEDICAL GROUP | Facility: LAB | Age: 51
End: 2021-12-19

## 2021-12-19 DIAGNOSIS — F41.9 ANXIETY: ICD-10-CM

## 2021-12-20 RX ORDER — PAROXETINE 10 MG/1
10 TABLET, FILM COATED ORAL DAILY
Qty: 21 TABLET | Refills: 0 | Status: SHIPPED | OUTPATIENT
Start: 2021-12-20 | End: 2022-01-04

## 2021-12-21 ENCOUNTER — PATIENT MESSAGE (OUTPATIENT)
Dept: MEDICAL GROUP | Facility: LAB | Age: 51
End: 2021-12-21

## 2022-01-02 ENCOUNTER — PATIENT MESSAGE (OUTPATIENT)
Dept: MEDICAL GROUP | Facility: LAB | Age: 52
End: 2022-01-02

## 2022-01-02 DIAGNOSIS — F41.9 ANXIETY: ICD-10-CM

## 2022-01-04 RX ORDER — FLUOXETINE 10 MG/1
10 CAPSULE ORAL DAILY
Qty: 30 CAPSULE | Refills: 2 | Status: SHIPPED | OUTPATIENT
Start: 2022-01-04 | End: 2022-11-11

## 2022-01-04 NOTE — TELEPHONE ENCOUNTER
From: Logan Mahoney  To: Physician Manuel Barbosa  Sent: 1/2/2022 10:22 AM PST  Subject: Another medication     Hello Dr. Barbosa,    Happy New Year! Paxil isn’t working , but I think I need something like it. Is there something with the least amount of side affects? On Paxil I started not to sleep well at night, dry mouth, no sexy Drive but, I did have a more even emotional state. Is there something I could take in the morning to help for a little while for my emotional state, natural or prescription wise?  I will make an appointment to meet with you, but wanted to know if you have an ideas.    Logan Mahoney

## 2022-01-05 ENCOUNTER — PATIENT MESSAGE (OUTPATIENT)
Dept: MEDICAL GROUP | Facility: LAB | Age: 52
End: 2022-01-05

## 2022-01-12 ENCOUNTER — PATIENT MESSAGE (OUTPATIENT)
Dept: MEDICAL GROUP | Facility: LAB | Age: 52
End: 2022-01-12

## 2022-01-13 ENCOUNTER — PATIENT MESSAGE (OUTPATIENT)
Dept: MEDICAL GROUP | Facility: LAB | Age: 52
End: 2022-01-13

## 2022-01-13 DIAGNOSIS — R30.0 DYSURIA: ICD-10-CM

## 2022-01-18 ENCOUNTER — HOSPITAL ENCOUNTER (OUTPATIENT)
Facility: MEDICAL CENTER | Age: 52
End: 2022-01-18
Attending: PHYSICIAN ASSISTANT
Payer: COMMERCIAL

## 2022-01-18 ENCOUNTER — OFFICE VISIT (OUTPATIENT)
Dept: MEDICAL GROUP | Facility: LAB | Age: 52
End: 2022-01-18
Payer: COMMERCIAL

## 2022-01-18 VITALS
SYSTOLIC BLOOD PRESSURE: 134 MMHG | TEMPERATURE: 97.9 F | HEIGHT: 65 IN | HEART RATE: 92 BPM | DIASTOLIC BLOOD PRESSURE: 82 MMHG | WEIGHT: 293 LBS | OXYGEN SATURATION: 93 % | BODY MASS INDEX: 48.82 KG/M2

## 2022-01-18 DIAGNOSIS — Z20.822 CONTACT WITH AND (SUSPECTED) EXPOSURE TO COVID-19: ICD-10-CM

## 2022-01-18 LAB
EXTERNAL QUALITY CONTROL: NORMAL
SARS-COV+SARS-COV-2 AG RESP QL IA.RAPID: NEGATIVE

## 2022-01-18 PROCEDURE — 99213 OFFICE O/P EST LOW 20 MIN: CPT | Mod: CS | Performed by: PHYSICIAN ASSISTANT

## 2022-01-18 PROCEDURE — 87426 SARSCOV CORONAVIRUS AG IA: CPT | Performed by: PHYSICIAN ASSISTANT

## 2022-01-18 PROCEDURE — 0240U HCHG SARS-COV-2 COVID-19 NFCT DS RESP RNA 3 TRGT MIC: CPT

## 2022-01-18 RX ORDER — BENZONATATE 100 MG/1
100 CAPSULE ORAL 3 TIMES DAILY PRN
Qty: 21 CAPSULE | Refills: 0 | Status: SHIPPED | OUTPATIENT
Start: 2022-01-18 | End: 2022-01-25

## 2022-01-18 ASSESSMENT — FIBROSIS 4 INDEX: FIB4 SCORE: 0.58

## 2022-01-18 NOTE — PROGRESS NOTES
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  2/3, COVID-vaccine    OTC treatments: Advil Cold and Sinus, Dimetap,  peppermint oil rub      ROS:  Negative except as indicated above    Current Outpatient Medications Ordered in Epic   Medication Sig Dispense Refill   • FLUoxetine (PROZAC) 10 MG Cap Take 1 Capsule by mouth every day. 30 Capsule 2   • Naltrexone-buPROPion HCl ER 8-90 MG TABLET SR 12 HR Take 1 Tablet by mouth see administration instructions. Start with 1 tablet x 7 days then twice daily x7 days then 2 p.o. every morning and 1 p.o. every afternoon then 2 p.o. twice daily 100 Tablet 3   • pioglitazone (ACTOS) 15 MG Tab Take 1 Tablet by mouth every day. 30 Tablet 11   • phentermine 37.5 MG capsule Take 1 Capsule by mouth every morning for 30 days. Refill #2 of #2. 30 Capsule 0   • polymixin-trimethoprim (POLYTRIM) 94468-3.1 UNIT/ML-% Solution Administer 1 Drop into the left eye 4 times a day. 10 mL 0   • metFORMIN (GLUCOPHAGE) 500 MG Tab Take 500 mg by mouth 2 times a day with meals.     • Misc. Devices Misc Nocturnal desaturation study. 1 Each 0   • carvedilol (COREG) 6.25 MG Tab TAKE 1 TABLET BY MOUTH TWICE DAILY WITH MEALS 180 tablet 3   • Blood Glucose Monitoring Suppl (ONE TOUCH ULTRA 2) w/Device Kit USE FOR BLOOD SUGAR TEST UTD  0   • ONE TOUCH ULTRA TEST strip USE ONE STRIP FOR BLOOD SUGAR TEST D AND PRN  3   • ONETOUCH DELICA LANCETS 33G Misc USE ONE LANCET FOR BLOOD SUGAR TEST D AND PRN  3   • NON SPECIFIED Glucometer and Test Strips and related supplies as covered by insurance.  QS for 90 days check qd and prn 150 Each 3   • vitamin D (CHOLECALCIFEROL) 1000 UNIT Tab Take 1,000 Units by mouth every day.       No current  [facility]-ordered facility-administered medications on file.         Objective:     Exam:  LMP 03/01/2016  There is no height or weight on file to calculate BMI.    Gen: Alert and oriented, No apparent distress.  HEENT: pupils PERRLA, The pinna, tragus, and ear canal are non-tender and without swelling. The ear canal is clear without discharge. The tympanic membrane is normal in appearance with a good cone of light. Oral mucosa is pink and moist with good dentition. Tongue normal in appearance without lesions and with good symmetrical movement. No buccal nodules or lesions are noted. The pharynx is red in appearance without tonsillar swelling or exudates.   Neck: Neck is supple without lymphadenopathy.  Lungs: Normal effort, CTA bilaterally, no wheezes, rhonchi, or rales  CV: Regular rate and rhythm. No murmurs, rubs, or gallops.  Ext: No clubbing, cyanosis, edema.    POCT COVID test: Negative    Assessment & Plan:     51 y.o. female with the following -     1. Contact with and (suspected) exposure to covid-19  ASSESSMENT:SARS-CoV-2 exposure, patient stable and appropriate for testing today.    PLAN:Labs/tests:-- SARS-CoV-2 PCR test collected    Counseling/Patient Education:--   Symptomatic: Isolation precautions until swab results (and if unvaccinated, quarantine for 10 days from exposure)  Reviewed expected course/prognosis of COVID-19-- Advised wearing a mask in public-- Encouraged hand hygiene  Continue OTC treatments Advil Cold and Sinus, Dimetapp  Tessalon Perles 100 mg 3 times daily as needed cough  Red Flags discussed. I conducted the encounter wearing the following PPE: N95, face shield, gloves, The patient was wearing a mask.      I spent a total of 15 minutes with record review, exam, communication with the patient, communication with other providers, and documentation of this encounter.      No follow-ups on file.    Please note that this dictation was created using voice recognition software. I have made  every reasonable attempt to correct obvious errors, but there may be errors of grammar and possibly content that I did not discover before finalizing the note.

## 2022-01-18 NOTE — LETTER
[facility]  [id number]     January 18, 2022    Patient: Logan Mahoney   YOB: 1970   Date of Visit: 1/18/2022       To Whom It May Concern:    Logan Mahoney was seen and treated in our department on 1/18/2022.     Please excuse her through 01/21/2022 pending test results.    Sincerely,     Cherry Bolton P.A.-C.

## 2022-01-19 LAB
FLUAV RNA SPEC QL NAA+PROBE: NEGATIVE
FLUBV RNA SPEC QL NAA+PROBE: NEGATIVE
SARS-COV-2 RNA RESP QL NAA+PROBE: NOTDETECTED
SPECIMEN SOURCE: NORMAL

## 2022-02-09 ENCOUNTER — APPOINTMENT (OUTPATIENT)
Dept: RADIOLOGY | Facility: MEDICAL CENTER | Age: 52
End: 2022-02-09
Attending: OBSTETRICS & GYNECOLOGY
Payer: COMMERCIAL

## 2022-03-04 ENCOUNTER — HOSPITAL ENCOUNTER (OUTPATIENT)
Dept: LAB | Facility: MEDICAL CENTER | Age: 52
End: 2022-03-04
Attending: INTERNAL MEDICINE
Payer: COMMERCIAL

## 2022-03-04 DIAGNOSIS — N30.01 ACUTE CYSTITIS WITH HEMATURIA: ICD-10-CM

## 2022-03-04 DIAGNOSIS — E66.01 OBESITY, MORBID, BMI 50 OR HIGHER (HCC): ICD-10-CM

## 2022-03-04 DIAGNOSIS — M17.0 OSTEOARTHRITIS OF BOTH KNEES, UNSPECIFIED OSTEOARTHRITIS TYPE: ICD-10-CM

## 2022-03-04 DIAGNOSIS — E11.9 CONTROLLED TYPE 2 DIABETES MELLITUS WITHOUT COMPLICATION, WITHOUT LONG-TERM CURRENT USE OF INSULIN (HCC): ICD-10-CM

## 2022-03-04 DIAGNOSIS — R30.0 DYSURIA: ICD-10-CM

## 2022-03-04 LAB
ALBUMIN SERPL BCP-MCNC: 4.3 G/DL (ref 3.2–4.9)
ALBUMIN/GLOB SERPL: 1.3 G/DL
ALP SERPL-CCNC: 139 U/L (ref 30–99)
ALT SERPL-CCNC: 53 U/L (ref 2–50)
ANION GAP SERPL CALC-SCNC: 12 MMOL/L (ref 7–16)
APPEARANCE UR: ABNORMAL
AST SERPL-CCNC: 66 U/L (ref 12–45)
BACTERIA #/AREA URNS HPF: ABNORMAL /HPF
BASOPHILS # BLD AUTO: 1.1 % (ref 0–1.8)
BASOPHILS # BLD: 0.09 K/UL (ref 0–0.12)
BILIRUB SERPL-MCNC: 0.4 MG/DL (ref 0.1–1.5)
BILIRUB UR QL STRIP.AUTO: NEGATIVE
BUN SERPL-MCNC: 11 MG/DL (ref 8–22)
CALCIUM SERPL-MCNC: 9.4 MG/DL (ref 8.5–10.5)
CHLORIDE SERPL-SCNC: 101 MMOL/L (ref 96–112)
CHOLEST SERPL-MCNC: 215 MG/DL (ref 100–199)
CO2 SERPL-SCNC: 26 MMOL/L (ref 20–33)
COLOR UR: YELLOW
CREAT SERPL-MCNC: 0.61 MG/DL (ref 0.5–1.4)
EOSINOPHIL # BLD AUTO: 0.26 K/UL (ref 0–0.51)
EOSINOPHIL NFR BLD: 3.3 % (ref 0–6.9)
EPI CELLS #/AREA URNS HPF: NEGATIVE /HPF
ERYTHROCYTE [DISTWIDTH] IN BLOOD BY AUTOMATED COUNT: 43.1 FL (ref 35.9–50)
EST. AVERAGE GLUCOSE BLD GHB EST-MCNC: 266 MG/DL
FASTING STATUS PATIENT QL REPORTED: NORMAL
GLOBULIN SER CALC-MCNC: 3.4 G/DL (ref 1.9–3.5)
GLUCOSE SERPL-MCNC: 327 MG/DL (ref 65–99)
GLUCOSE UR STRIP.AUTO-MCNC: >=1000 MG/DL
HBA1C MFR BLD: 10.9 % (ref 4–5.6)
HCT VFR BLD AUTO: 47 % (ref 37–47)
HDLC SERPL-MCNC: 38 MG/DL
HGB BLD-MCNC: 15.8 G/DL (ref 12–16)
HYALINE CASTS #/AREA URNS LPF: ABNORMAL /LPF
IMM GRANULOCYTES # BLD AUTO: 0.02 K/UL (ref 0–0.11)
IMM GRANULOCYTES NFR BLD AUTO: 0.3 % (ref 0–0.9)
KETONES UR STRIP.AUTO-MCNC: NEGATIVE MG/DL
LDLC SERPL CALC-MCNC: 140 MG/DL
LEUKOCYTE ESTERASE UR QL STRIP.AUTO: ABNORMAL
LYMPHOCYTES # BLD AUTO: 2.22 K/UL (ref 1–4.8)
LYMPHOCYTES NFR BLD: 28 % (ref 22–41)
MCH RBC QN AUTO: 31.2 PG (ref 27–33)
MCHC RBC AUTO-ENTMCNC: 33.6 G/DL (ref 33.6–35)
MCV RBC AUTO: 92.7 FL (ref 81.4–97.8)
MICRO URNS: ABNORMAL
MONOCYTES # BLD AUTO: 0.56 K/UL (ref 0–0.85)
MONOCYTES NFR BLD AUTO: 7.1 % (ref 0–13.4)
NEUTROPHILS # BLD AUTO: 4.78 K/UL (ref 2–7.15)
NEUTROPHILS NFR BLD: 60.2 % (ref 44–72)
NITRITE UR QL STRIP.AUTO: NEGATIVE
NRBC # BLD AUTO: 0 K/UL
NRBC BLD-RTO: 0 /100 WBC
PH UR STRIP.AUTO: 5.5 [PH] (ref 5–8)
PLATELET # BLD AUTO: 260 K/UL (ref 164–446)
PMV BLD AUTO: 10.8 FL (ref 9–12.9)
POTASSIUM SERPL-SCNC: 4.8 MMOL/L (ref 3.6–5.5)
PROT SERPL-MCNC: 7.7 G/DL (ref 6–8.2)
PROT UR QL STRIP: 30 MG/DL
RBC # BLD AUTO: 5.07 M/UL (ref 4.2–5.4)
RBC # URNS HPF: ABNORMAL /HPF
RBC UR QL AUTO: ABNORMAL
SODIUM SERPL-SCNC: 139 MMOL/L (ref 135–145)
SP GR UR STRIP.AUTO: 1.03
T4 FREE SERPL-MCNC: 1.13 NG/DL (ref 0.93–1.7)
TRIGL SERPL-MCNC: 185 MG/DL (ref 0–149)
TSH SERPL DL<=0.005 MIU/L-ACNC: 2.58 UIU/ML (ref 0.38–5.33)
UROBILINOGEN UR STRIP.AUTO-MCNC: 0.2 MG/DL
WBC # BLD AUTO: 7.9 K/UL (ref 4.8–10.8)
WBC #/AREA URNS HPF: ABNORMAL /HPF

## 2022-03-04 PROCEDURE — 83036 HEMOGLOBIN GLYCOSYLATED A1C: CPT

## 2022-03-04 PROCEDURE — 36415 COLL VENOUS BLD VENIPUNCTURE: CPT

## 2022-03-04 PROCEDURE — 80061 LIPID PANEL: CPT

## 2022-03-04 PROCEDURE — 80053 COMPREHEN METABOLIC PANEL: CPT

## 2022-03-04 PROCEDURE — 81001 URINALYSIS AUTO W/SCOPE: CPT

## 2022-03-04 PROCEDURE — 85025 COMPLETE CBC W/AUTO DIFF WBC: CPT

## 2022-03-04 PROCEDURE — 84439 ASSAY OF FREE THYROXINE: CPT

## 2022-03-04 PROCEDURE — 87086 URINE CULTURE/COLONY COUNT: CPT

## 2022-03-04 PROCEDURE — 84443 ASSAY THYROID STIM HORMONE: CPT

## 2022-03-04 RX ORDER — NITROFURANTOIN 25; 75 MG/1; MG/1
100 CAPSULE ORAL 2 TIMES DAILY
Qty: 10 CAPSULE | Refills: 0 | Status: SHIPPED | OUTPATIENT
Start: 2022-03-04 | End: 2022-03-10 | Stop reason: SDUPTHER

## 2022-03-06 LAB
BACTERIA UR CULT: NORMAL
SIGNIFICANT IND 70042: NORMAL
SITE SITE: NORMAL
SOURCE SOURCE: NORMAL

## 2022-03-08 ENCOUNTER — PATIENT MESSAGE (OUTPATIENT)
Dept: MEDICAL GROUP | Facility: LAB | Age: 52
End: 2022-03-08
Payer: COMMERCIAL

## 2022-03-08 DIAGNOSIS — N30.00 ACUTE CYSTITIS WITHOUT HEMATURIA: ICD-10-CM

## 2022-03-10 ENCOUNTER — PATIENT MESSAGE (OUTPATIENT)
Dept: MEDICAL GROUP | Facility: LAB | Age: 52
End: 2022-03-10
Payer: COMMERCIAL

## 2022-03-10 DIAGNOSIS — N30.01 ACUTE CYSTITIS WITH HEMATURIA: ICD-10-CM

## 2022-03-10 RX ORDER — NITROFURANTOIN 25; 75 MG/1; MG/1
100 CAPSULE ORAL 2 TIMES DAILY
Qty: 10 CAPSULE | Refills: 0 | Status: SHIPPED | OUTPATIENT
Start: 2022-03-10 | End: 2022-11-11

## 2022-03-12 ENCOUNTER — HOSPITAL ENCOUNTER (OUTPATIENT)
Dept: LAB | Facility: MEDICAL CENTER | Age: 52
End: 2022-03-12
Attending: INTERNAL MEDICINE
Payer: COMMERCIAL

## 2022-03-12 LAB
APPEARANCE UR: CLEAR
BACTERIA #/AREA URNS HPF: NEGATIVE /HPF
BILIRUB UR QL STRIP.AUTO: NEGATIVE
COLOR UR: YELLOW
EPI CELLS #/AREA URNS HPF: ABNORMAL /HPF
GLUCOSE UR STRIP.AUTO-MCNC: >=1000 MG/DL
HYALINE CASTS #/AREA URNS LPF: ABNORMAL /LPF
KETONES UR STRIP.AUTO-MCNC: NEGATIVE MG/DL
LEUKOCYTE ESTERASE UR QL STRIP.AUTO: ABNORMAL
MICRO URNS: ABNORMAL
NITRITE UR QL STRIP.AUTO: NEGATIVE
PH UR STRIP.AUTO: 5 [PH] (ref 5–8)
PROT UR QL STRIP: NEGATIVE MG/DL
RBC # URNS HPF: ABNORMAL /HPF
RBC UR QL AUTO: ABNORMAL
SP GR UR STRIP.AUTO: 1.03
UROBILINOGEN UR STRIP.AUTO-MCNC: 0.2 MG/DL
WBC #/AREA URNS HPF: ABNORMAL /HPF

## 2022-03-12 PROCEDURE — 81001 URINALYSIS AUTO W/SCOPE: CPT

## 2022-03-12 PROCEDURE — 87086 URINE CULTURE/COLONY COUNT: CPT

## 2022-03-14 LAB
BACTERIA UR CULT: NORMAL
SIGNIFICANT IND 70042: NORMAL
SITE SITE: NORMAL
SOURCE SOURCE: NORMAL

## 2022-03-16 ENCOUNTER — OFFICE VISIT (OUTPATIENT)
Dept: MEDICAL GROUP | Facility: LAB | Age: 52
End: 2022-03-16
Payer: COMMERCIAL

## 2022-03-16 VITALS
TEMPERATURE: 96.5 F | WEIGHT: 293 LBS | HEART RATE: 102 BPM | SYSTOLIC BLOOD PRESSURE: 116 MMHG | OXYGEN SATURATION: 93 % | DIASTOLIC BLOOD PRESSURE: 80 MMHG | BODY MASS INDEX: 48.82 KG/M2 | HEIGHT: 65 IN

## 2022-03-16 DIAGNOSIS — R79.89 LFT ELEVATION: ICD-10-CM

## 2022-03-16 DIAGNOSIS — Z98.890 S/P GASTRIC SURGERY: ICD-10-CM

## 2022-03-16 DIAGNOSIS — E66.01 MORBID OBESITY (HCC): ICD-10-CM

## 2022-03-16 DIAGNOSIS — I10 ESSENTIAL HYPERTENSION: ICD-10-CM

## 2022-03-16 DIAGNOSIS — Z12.11 SCREEN FOR COLON CANCER: Primary | ICD-10-CM

## 2022-03-16 DIAGNOSIS — B37.31 VAGINAL YEAST INFECTION: ICD-10-CM

## 2022-03-16 PROCEDURE — 99214 OFFICE O/P EST MOD 30 MIN: CPT | Performed by: INTERNAL MEDICINE

## 2022-03-16 RX ORDER — CARVEDILOL 6.25 MG/1
6.25 TABLET ORAL 2 TIMES DAILY WITH MEALS
Qty: 180 TABLET | Refills: 3 | Status: SHIPPED | OUTPATIENT
Start: 2022-03-16 | End: 2023-04-04

## 2022-03-16 RX ORDER — FLUCONAZOLE 150 MG/1
150 TABLET ORAL DAILY
Qty: 2 TABLET | Refills: 0 | Status: SHIPPED | OUTPATIENT
Start: 2022-03-16 | End: 2022-11-11

## 2022-03-16 ASSESSMENT — PATIENT HEALTH QUESTIONNAIRE - PHQ9: CLINICAL INTERPRETATION OF PHQ2 SCORE: 0

## 2022-03-16 ASSESSMENT — FIBROSIS 4 INDEX: FIB4 SCORE: 1.74

## 2022-03-17 NOTE — PROGRESS NOTES
CC: Logan Mahoney is a 52 y.o. female is suffering from   Chief Complaint   Patient presents with   • Lab Results   • Follow-Up         SUBJECTIVE:  1. Screen for colon cancer  Pamela is here for follow-up is in need of screening for colon cancer FIT testing ordered    2. Essential hypertension  Essential hypertension likely secondary to #5 below    3. Vaginal yeast infection  Start Diflucan    4. LFT elevation  Elevated liver function tests etiology uncertain possible nonalcoholic steatohepatitis versus nonalcoholic fatty liver disease    5. Morbid obesity (HCC)  Referral written to gastric surgery    6. S/P gastric surgery  Stable previous lap band which was removed        Past social, family, history: , special   Social History     Tobacco Use   • Smoking status: Never Smoker   • Smokeless tobacco: Never Used   Vaping Use   • Vaping Use: Never used   Substance Use Topics   • Alcohol use: No   • Drug use: No         MEDICATIONS:    Current Outpatient Medications:   •  carvedilol (COREG) 6.25 MG Tab, Take 1 Tablet by mouth 2 times a day with meals., Disp: 180 Tablet, Rfl: 3  •  fluconazole (DIFLUCAN) 150 MG tablet, Take 1 Tablet by mouth every day., Disp: 2 Tablet, Rfl: 0  •  nitrofurantoin (MACROBID) 100 MG Cap, Take 1 Capsule by mouth 2 times a day., Disp: 10 Capsule, Rfl: 0  •  FLUoxetine (PROZAC) 10 MG Cap, Take 1 Capsule by mouth every day., Disp: 30 Capsule, Rfl: 2  •  Naltrexone-buPROPion HCl ER 8-90 MG TABLET SR 12 HR, Take 1 Tablet by mouth see administration instructions. Start with 1 tablet x 7 days then twice daily x7 days then 2 p.o. every morning and 1 p.o. every afternoon then 2 p.o. twice daily, Disp: 100 Tablet, Rfl: 3  •  pioglitazone (ACTOS) 15 MG Tab, Take 1 Tablet by mouth every day., Disp: 30 Tablet, Rfl: 11  •  polymixin-trimethoprim (POLYTRIM) 80893-4.1 UNIT/ML-% Solution, Administer 1 Drop into the left eye 4 times a day., Disp: 10 mL, Rfl: 0  •  metFORMIN  (GLUCOPHAGE) 500 MG Tab, Take 500 mg by mouth 2 times a day with meals., Disp: , Rfl:   •  Misc. Devices Misc, Nocturnal desaturation study., Disp: 1 Each, Rfl: 0  •  Blood Glucose Monitoring Suppl (ONE TOUCH ULTRA 2) w/Device Kit, USE FOR BLOOD SUGAR TEST UTD, Disp: , Rfl: 0  •  ONE TOUCH ULTRA TEST strip, USE ONE STRIP FOR BLOOD SUGAR TEST D AND PRN, Disp: , Rfl: 3  •  ONETOUCH DELICA LANCETS 33G Misc, USE ONE LANCET FOR BLOOD SUGAR TEST D AND PRN, Disp: , Rfl: 3  •  NON SPECIFIED, Glucometer and Test Strips and related supplies as covered by insurance.  QS for 90 days check qd and prn, Disp: 150 Each, Rfl: 3  •  vitamin D (CHOLECALCIFEROL) 1000 UNIT Tab, Take 1,000 Units by mouth every day., Disp: , Rfl:     PROBLEMS:  Patient Active Problem List    Diagnosis Date Noted   • DM II (diabetes mellitus, type II), controlled ([facility]) 06/12/2019   • Vitamin D deficiency disease 12/02/2016   • Morbid obesity ([facility]) 05/31/2016   • S/P gastric surgery 04/18/2016   • Benign hypertension 07/20/2012   • Preop cardiovascular exam 07/20/2012   • Nonspecific abnormal electrocardiogram (ECG) (EKG) 07/20/2012       REVIEW OF SYSTEMS:  Gen.:  No Nausea, Vomiting, fever, Chills.  Heart: No chest pain.  Lungs:  No shortness of Breath.  Psychological: Rinku unusual Anxiety depression     PHYSICAL EXAM   Constitutional: Alert, cooperative, not in acute distress.  Cardiovascular:  Rate Rhythm is regular without murmurs rubs clicks.     Thorax & Lungs: Clear to auscultation, no wheezing, rhonchi, or rales  HENT: Normocephalic, Atraumatic.  Eyes: PERRLA, EOMI, Conjunctiva normal.   Neck: Trachia is midline no swelling of the thyroid.   Lymphatic: No lymphadenopathy noted.   Neurologic: Alert & oriented x 3, cranial nerves II through XII are intact, Normal motor function, Normal sensory function, No focal deficits noted.   Psychiatric: Affect normal, Judgment normal, Mood normal.     VITAL SIGNS:/80 (BP Location: Left arm, Patient  "Position: Sitting, BP Cuff Size: Adult long)   Pulse (!) 102   Temp 35.8 °C (96.5 °F) (Temporal)   Ht 1.638 m (5' 4.5\")   Wt (!) 136 kg (299 lb)   LMP 03/01/2016   SpO2 93%   BMI 50.53 kg/m²     Labs: Reviewed    Assessment:                                                     Plan:    1. Screen for colon cancer  Cologuard testing ordered  - COLOGUARD (FIT DNA)  - Sed Rate; Future  - CRP QUANTITIVE (NON-CARDIAC); Future  - CBC WITH DIFFERENTIAL; Future    2. Essential hypertension  Continue Coreg  - carvedilol (COREG) 6.25 MG Tab; Take 1 Tablet by mouth 2 times a day with meals.  Dispense: 180 Tablet; Refill: 3  - Sed Rate; Future  - CRP QUANTITIVE (NON-CARDIAC); Future  - CBC WITH DIFFERENTIAL; Future    3. Vaginal yeast infection  Complete comprehensive metabolic panel before starting fluconazole  - fluconazole (DIFLUCAN) 150 MG tablet; Take 1 Tablet by mouth every day.  Dispense: 2 Tablet; Refill: 0  - Sed Rate; Future  - CRP QUANTITIVE (NON-CARDIAC); Future  - CBC WITH DIFFERENTIAL; Future    4. LFT elevation  Comprehensive metabolic panel nonfasting ordered  - Comp Metabolic Panel; Future  - Sed Rate; Future  - CRP QUANTITIVE (NON-CARDIAC); Future  - CBC WITH DIFFERENTIAL; Future  - Comp Metabolic Panel; Future    5. Morbid obesity (HCC)  Referral written to surgery  - Referral to General Surgery  - Sed Rate; Future  - CRP QUANTITIVE (NON-CARDIAC); Future  - CBC WITH DIFFERENTIAL; Future    6. S/P gastric surgery  No change in medical therapy  - Referral to General Surgery  - Sed Rate; Future  - CRP QUANTITIVE (NON-CARDIAC); Future  - CBC WITH DIFFERENTIAL; Future    "

## 2022-03-18 ENCOUNTER — HOSPITAL ENCOUNTER (OUTPATIENT)
Dept: LAB | Facility: MEDICAL CENTER | Age: 52
End: 2022-03-18
Attending: INTERNAL MEDICINE
Payer: COMMERCIAL

## 2022-03-18 DIAGNOSIS — Z98.890 S/P GASTRIC SURGERY: ICD-10-CM

## 2022-03-18 DIAGNOSIS — B37.31 VAGINAL YEAST INFECTION: ICD-10-CM

## 2022-03-18 DIAGNOSIS — N30.00 ACUTE CYSTITIS WITHOUT HEMATURIA: ICD-10-CM

## 2022-03-18 DIAGNOSIS — E66.01 MORBID OBESITY (HCC): ICD-10-CM

## 2022-03-18 DIAGNOSIS — Z12.11 SCREEN FOR COLON CANCER: ICD-10-CM

## 2022-03-18 DIAGNOSIS — R79.89 LFT ELEVATION: ICD-10-CM

## 2022-03-18 DIAGNOSIS — I10 ESSENTIAL HYPERTENSION: ICD-10-CM

## 2022-03-18 LAB
ALBUMIN SERPL BCP-MCNC: 4.2 G/DL (ref 3.2–4.9)
ALBUMIN/GLOB SERPL: 1.4 G/DL
ALP SERPL-CCNC: 148 U/L (ref 30–99)
ALT SERPL-CCNC: 73 U/L (ref 2–50)
ANION GAP SERPL CALC-SCNC: 17 MMOL/L (ref 7–16)
APPEARANCE UR: CLEAR
AST SERPL-CCNC: 86 U/L (ref 12–45)
BACTERIA #/AREA URNS HPF: NEGATIVE /HPF
BASOPHILS # BLD AUTO: 1.1 % (ref 0–1.8)
BASOPHILS # BLD: 0.09 K/UL (ref 0–0.12)
BILIRUB SERPL-MCNC: 0.4 MG/DL (ref 0.1–1.5)
BILIRUB UR QL STRIP.AUTO: NEGATIVE
BUN SERPL-MCNC: 8 MG/DL (ref 8–22)
CALCIUM SERPL-MCNC: 9.6 MG/DL (ref 8.5–10.5)
CHLORIDE SERPL-SCNC: 93 MMOL/L (ref 96–112)
CO2 SERPL-SCNC: 23 MMOL/L (ref 20–33)
COLOR UR: YELLOW
CREAT SERPL-MCNC: 0.69 MG/DL (ref 0.5–1.4)
CRP SERPL HS-MCNC: 0.57 MG/DL (ref 0–0.75)
EOSINOPHIL # BLD AUTO: 0.23 K/UL (ref 0–0.51)
EOSINOPHIL NFR BLD: 2.8 % (ref 0–6.9)
EPI CELLS #/AREA URNS HPF: ABNORMAL /HPF
ERYTHROCYTE [DISTWIDTH] IN BLOOD BY AUTOMATED COUNT: 44.6 FL (ref 35.9–50)
ERYTHROCYTE [SEDIMENTATION RATE] IN BLOOD BY WESTERGREN METHOD: 6 MM/HOUR (ref 0–25)
GFR SERPLBLD CREATININE-BSD FMLA CKD-EPI: 106 ML/MIN/1.73 M 2
GLOBULIN SER CALC-MCNC: 3.1 G/DL (ref 1.9–3.5)
GLUCOSE SERPL-MCNC: 519 MG/DL (ref 65–99)
GLUCOSE UR STRIP.AUTO-MCNC: >=1000 MG/DL
HCT VFR BLD AUTO: 48.7 % (ref 37–47)
HGB BLD-MCNC: 15.6 G/DL (ref 12–16)
HYALINE CASTS #/AREA URNS LPF: ABNORMAL /LPF
IMM GRANULOCYTES # BLD AUTO: 0.02 K/UL (ref 0–0.11)
IMM GRANULOCYTES NFR BLD AUTO: 0.2 % (ref 0–0.9)
KETONES UR STRIP.AUTO-MCNC: NEGATIVE MG/DL
LEUKOCYTE ESTERASE UR QL STRIP.AUTO: ABNORMAL
LYMPHOCYTES # BLD AUTO: 2.29 K/UL (ref 1–4.8)
LYMPHOCYTES NFR BLD: 27.4 % (ref 22–41)
MCH RBC QN AUTO: 30.5 PG (ref 27–33)
MCHC RBC AUTO-ENTMCNC: 32 G/DL (ref 33.6–35)
MCV RBC AUTO: 95.1 FL (ref 81.4–97.8)
MICRO URNS: ABNORMAL
MONOCYTES # BLD AUTO: 0.54 K/UL (ref 0–0.85)
MONOCYTES NFR BLD AUTO: 6.5 % (ref 0–13.4)
NEUTROPHILS # BLD AUTO: 5.18 K/UL (ref 2–7.15)
NEUTROPHILS NFR BLD: 62 % (ref 44–72)
NITRITE UR QL STRIP.AUTO: NEGATIVE
NRBC # BLD AUTO: 0 K/UL
NRBC BLD-RTO: 0 /100 WBC
PH UR STRIP.AUTO: 6 [PH] (ref 5–8)
PLATELET # BLD AUTO: 252 K/UL (ref 164–446)
PMV BLD AUTO: 11.3 FL (ref 9–12.9)
POTASSIUM SERPL-SCNC: 4.7 MMOL/L (ref 3.6–5.5)
PROT SERPL-MCNC: 7.3 G/DL (ref 6–8.2)
PROT UR QL STRIP: NEGATIVE MG/DL
RBC # BLD AUTO: 5.12 M/UL (ref 4.2–5.4)
RBC # URNS HPF: ABNORMAL /HPF
RBC UR QL AUTO: ABNORMAL
SODIUM SERPL-SCNC: 133 MMOL/L (ref 135–145)
SP GR UR STRIP.AUTO: 1.04
UROBILINOGEN UR STRIP.AUTO-MCNC: 0.2 MG/DL
WBC # BLD AUTO: 8.4 K/UL (ref 4.8–10.8)
WBC #/AREA URNS HPF: ABNORMAL /HPF

## 2022-03-18 PROCEDURE — 99284 EMERGENCY DEPT VISIT MOD MDM: CPT

## 2022-03-18 PROCEDURE — 85652 RBC SED RATE AUTOMATED: CPT

## 2022-03-18 PROCEDURE — 87086 URINE CULTURE/COLONY COUNT: CPT

## 2022-03-18 PROCEDURE — 86140 C-REACTIVE PROTEIN: CPT

## 2022-03-18 PROCEDURE — 36415 COLL VENOUS BLD VENIPUNCTURE: CPT

## 2022-03-18 PROCEDURE — 85025 COMPLETE CBC W/AUTO DIFF WBC: CPT

## 2022-03-18 PROCEDURE — 81001 URINALYSIS AUTO W/SCOPE: CPT

## 2022-03-18 PROCEDURE — 80053 COMPREHEN METABOLIC PANEL: CPT

## 2022-03-19 ENCOUNTER — HOSPITAL ENCOUNTER (EMERGENCY)
Facility: MEDICAL CENTER | Age: 52
End: 2022-03-19
Attending: EMERGENCY MEDICINE
Payer: COMMERCIAL

## 2022-03-19 VITALS
TEMPERATURE: 98 F | WEIGHT: 293 LBS | RESPIRATION RATE: 18 BRPM | DIASTOLIC BLOOD PRESSURE: 80 MMHG | HEIGHT: 64 IN | HEART RATE: 76 BPM | OXYGEN SATURATION: 93 % | BODY MASS INDEX: 50.02 KG/M2 | SYSTOLIC BLOOD PRESSURE: 154 MMHG

## 2022-03-19 DIAGNOSIS — R74.01 TRANSAMINITIS: ICD-10-CM

## 2022-03-19 DIAGNOSIS — R73.9 HYPERGLYCEMIA: ICD-10-CM

## 2022-03-19 LAB
ALBUMIN SERPL BCP-MCNC: 4.1 G/DL (ref 3.2–4.9)
ALBUMIN/GLOB SERPL: 1.2 G/DL
ALP SERPL-CCNC: 141 U/L (ref 30–99)
ALT SERPL-CCNC: 75 U/L (ref 2–50)
ANION GAP SERPL CALC-SCNC: 14 MMOL/L (ref 7–16)
APPEARANCE UR: ABNORMAL
AST SERPL-CCNC: 80 U/L (ref 12–45)
BACTERIA #/AREA URNS HPF: NEGATIVE /HPF
BASE EXCESS BLDV CALC-SCNC: -2 MMOL/L
BASOPHILS # BLD AUTO: 0.7 % (ref 0–1.8)
BASOPHILS # BLD: 0.07 K/UL (ref 0–0.12)
BILIRUB SERPL-MCNC: 0.2 MG/DL (ref 0.1–1.5)
BILIRUB UR QL STRIP.AUTO: NEGATIVE
BODY TEMPERATURE: NORMAL CENTIGRADE
BUN SERPL-MCNC: 9 MG/DL (ref 8–22)
CALCIUM SERPL-MCNC: 9.3 MG/DL (ref 8.5–10.5)
CHLORIDE SERPL-SCNC: 98 MMOL/L (ref 96–112)
CO2 SERPL-SCNC: 23 MMOL/L (ref 20–33)
COLOR UR: YELLOW
CREAT SERPL-MCNC: 0.59 MG/DL (ref 0.5–1.4)
EKG IMPRESSION: NORMAL
EOSINOPHIL # BLD AUTO: 0.23 K/UL (ref 0–0.51)
EOSINOPHIL NFR BLD: 2.4 % (ref 0–6.9)
EPI CELLS #/AREA URNS HPF: ABNORMAL /HPF
ERYTHROCYTE [DISTWIDTH] IN BLOOD BY AUTOMATED COUNT: 42.4 FL (ref 35.9–50)
GFR SERPLBLD CREATININE-BSD FMLA CKD-EPI: 110 ML/MIN/1.73 M 2
GLOBULIN SER CALC-MCNC: 3.3 G/DL (ref 1.9–3.5)
GLUCOSE BLD STRIP.AUTO-MCNC: 343 MG/DL (ref 65–99)
GLUCOSE BLD STRIP.AUTO-MCNC: 477 MG/DL (ref 65–99)
GLUCOSE SERPL-MCNC: 420 MG/DL (ref 65–99)
GLUCOSE UR STRIP.AUTO-MCNC: >=1000 MG/DL
HCO3 BLDV-SCNC: 24 MMOL/L (ref 24–28)
HCT VFR BLD AUTO: 46 % (ref 37–47)
HGB BLD-MCNC: 15.3 G/DL (ref 12–16)
HYALINE CASTS #/AREA URNS LPF: ABNORMAL /LPF
IMM GRANULOCYTES # BLD AUTO: 0.04 K/UL (ref 0–0.11)
IMM GRANULOCYTES NFR BLD AUTO: 0.4 % (ref 0–0.9)
KETONES UR STRIP.AUTO-MCNC: ABNORMAL MG/DL
LEUKOCYTE ESTERASE UR QL STRIP.AUTO: ABNORMAL
LYMPHOCYTES # BLD AUTO: 2.79 K/UL (ref 1–4.8)
LYMPHOCYTES NFR BLD: 29.2 % (ref 22–41)
MCH RBC QN AUTO: 30.7 PG (ref 27–33)
MCHC RBC AUTO-ENTMCNC: 33.3 G/DL (ref 33.6–35)
MCV RBC AUTO: 92.4 FL (ref 81.4–97.8)
MICRO URNS: ABNORMAL
MONOCYTES # BLD AUTO: 0.74 K/UL (ref 0–0.85)
MONOCYTES NFR BLD AUTO: 7.7 % (ref 0–13.4)
NEUTROPHILS # BLD AUTO: 5.69 K/UL (ref 2–7.15)
NEUTROPHILS NFR BLD: 59.6 % (ref 44–72)
NITRITE UR QL STRIP.AUTO: NEGATIVE
NRBC # BLD AUTO: 0 K/UL
NRBC BLD-RTO: 0 /100 WBC
PCO2 BLDV: 42.6 MMHG (ref 41–51)
PH BLDV: 7.36 [PH] (ref 7.31–7.45)
PH UR STRIP.AUTO: 5 [PH] (ref 5–8)
PLATELET # BLD AUTO: 235 K/UL (ref 164–446)
PMV BLD AUTO: 10.5 FL (ref 9–12.9)
PO2 BLDV: 38.1 MMHG (ref 25–40)
POTASSIUM SERPL-SCNC: 4.3 MMOL/L (ref 3.6–5.5)
PROT SERPL-MCNC: 7.4 G/DL (ref 6–8.2)
PROT UR QL STRIP: NEGATIVE MG/DL
RBC # BLD AUTO: 4.98 M/UL (ref 4.2–5.4)
RBC # URNS HPF: ABNORMAL /HPF
RBC UR QL AUTO: NEGATIVE
SAO2 % BLDV: 70.3 %
SODIUM SERPL-SCNC: 135 MMOL/L (ref 135–145)
SP GR UR STRIP.AUTO: >=1.045
UROBILINOGEN UR STRIP.AUTO-MCNC: 0.2 MG/DL
WBC # BLD AUTO: 9.6 K/UL (ref 4.8–10.8)
WBC #/AREA URNS HPF: ABNORMAL /HPF

## 2022-03-19 PROCEDURE — 82803 BLOOD GASES ANY COMBINATION: CPT

## 2022-03-19 PROCEDURE — 80053 COMPREHEN METABOLIC PANEL: CPT

## 2022-03-19 PROCEDURE — 81001 URINALYSIS AUTO W/SCOPE: CPT

## 2022-03-19 PROCEDURE — 700105 HCHG RX REV CODE 258: Performed by: EMERGENCY MEDICINE

## 2022-03-19 PROCEDURE — 82962 GLUCOSE BLOOD TEST: CPT | Mod: 91

## 2022-03-19 PROCEDURE — 36415 COLL VENOUS BLD VENIPUNCTURE: CPT

## 2022-03-19 PROCEDURE — 85025 COMPLETE CBC W/AUTO DIFF WBC: CPT

## 2022-03-19 PROCEDURE — 87086 URINE CULTURE/COLONY COUNT: CPT

## 2022-03-19 PROCEDURE — 93005 ELECTROCARDIOGRAM TRACING: CPT | Performed by: EMERGENCY MEDICINE

## 2022-03-19 PROCEDURE — 94760 N-INVAS EAR/PLS OXIMETRY 1: CPT

## 2022-03-19 PROCEDURE — 96374 THER/PROPH/DIAG INJ IV PUSH: CPT

## 2022-03-19 PROCEDURE — 700101 HCHG RX REV CODE 250: Performed by: EMERGENCY MEDICINE

## 2022-03-19 PROCEDURE — 700102 HCHG RX REV CODE 250 W/ 637 OVERRIDE(OP): Performed by: EMERGENCY MEDICINE

## 2022-03-19 RX ORDER — SODIUM CHLORIDE 9 MG/ML
1000 INJECTION, SOLUTION INTRAVENOUS ONCE
Status: COMPLETED | OUTPATIENT
Start: 2022-03-19 | End: 2022-03-19

## 2022-03-19 RX ORDER — SODIUM CHLORIDE AND POTASSIUM CHLORIDE 150; 900 MG/100ML; MG/100ML
INJECTION, SOLUTION INTRAVENOUS CONTINUOUS
Status: DISCONTINUED | OUTPATIENT
Start: 2022-03-19 | End: 2022-03-19 | Stop reason: HOSPADM

## 2022-03-19 RX ADMIN — POTASSIUM CHLORIDE AND SODIUM CHLORIDE: 900; 150 INJECTION, SOLUTION INTRAVENOUS at 03:04

## 2022-03-19 RX ADMIN — SODIUM CHLORIDE 1000 ML: 9 INJECTION, SOLUTION INTRAVENOUS at 01:29

## 2022-03-19 RX ADMIN — INSULIN HUMAN 10 UNITS: 100 INJECTION, SOLUTION PARENTERAL at 02:56

## 2022-03-19 ASSESSMENT — FIBROSIS 4 INDEX: FIB4 SCORE: 2

## 2022-03-19 NOTE — ED TRIAGE NOTES
Loganmedardo Mahoney  52 y.o. female  Chief Complaint   Patient presents with   • Abnormal Labs     Patient referred to ED by PCP for elevated glucose and liver enzyme levels. Pt had routine blood work done for new medication start, labs abnormal.   on labs and anion gap 17. Denies polyuria, polydipsia, polyphagia.  Otherwise feels well       Vitals:    03/18/22 2359   BP: (!) 177/96   Pulse: 87   Resp: 16   Temp: 36 °C (96.8 °F)   SpO2: 93%       Triage process explained to patient, apologized for wait time, and returned to lobby.  Pt informed to notify staff of any change in condition.

## 2022-03-19 NOTE — ED PROVIDER NOTES
ED Provider Note    Scribed for Chemo Darby by Ammy Hendrickson. 3/19/2022  12:45 AM    Primary care provider: Manuel Barbosa D.O.  Means of arrival: walk in  History obtained from: Patient  History limited by: None    CHIEF COMPLAINT  Chief Complaint   Patient presents with   • Abnormal Labs     HPI  Logan Mahoney is a 52 y.o. female who presents to the Emergency Department with abnormal labs onset today. The patient reports she was visiting her PCP today for routine blood work and was advised to visit the ED due to elevated glucose and liver enzyme levels. She notes she has history of diabetes and takes metformin. When she used to regularly check her glucose, she notes her levels were typically between 118 - 145. However, she has not checked her glucose levels in a while. She states she has had a UTI for the past 2 weeks and has taken 2 different courses of antibiotics, but it has not resolved the UTI and she still endorses itching and burning pelvic pain. She denies any nausea, vomiting, polyuria, chest pain, shortness of breath, or abdominal pain. She also takes carvedilol twice a day to treat her hypertension. Patient has received her Covid-19 vaccine, but not her booster yet. She denies smoking or abusing drugs or alcohol.     Quality:burning, itching  Duration: 2 weeks  Severity: mild  Associated sx: elevated glucose levels    REVIEW OF SYSTEMS  As above, all other systems reviewed and are negative.   See HPI for further details.     PAST MEDICAL HISTORY   has a past medical history of Anemia, Anesthesia, Arthritis, Benign hypertension (7/20/2012), Cancer ([facility]) (2019), DM II (diabetes mellitus, type II), controlled ([facility]) (6/12/2019), Heart burn, Morbid obesity ([facility]) (5/31/2016), Nonspecific abnormal electrocardiogram (ECG) (EKG) (7/20/2012), Preop cardiovascular exam (7/20/2012), S/P gastric surgery (4/18/2016), and Vitamin D deficiency disease (12/2/2016).  SURGICAL HISTORY   has a past  surgical history that includes tonsillectomy; other abdominal surgery (2013); knee arthroscopy (1990); and laparoscopic band removal (11/5/2018).  SOCIAL HISTORY  Social History     Tobacco Use   • Smoking status: Never Smoker   • Smokeless tobacco: Never Used   Vaping Use   • Vaping Use: Never used   Substance Use Topics   • Alcohol use: No   • Drug use: No      Social History     Substance and Sexual Activity   Drug Use No     FAMILY HISTORY  Family History   Problem Relation Age of Onset   • Hypertension Mother    • Heart Attack Father    • Other Father    • Stroke Father    • Hypertension Father    • Heart Attack Paternal Grandfather      CURRENT MEDICATIONS  Home Medications     Reviewed by Stephanie Davidson R.N. (Registered Nurse) on 03/19/22 at 0021  Med List Status: <None>   Medication Last Dose Status   Blood Glucose Monitoring Suppl (ONE TOUCH ULTRA 2) w/Device Kit  Active   carvedilol (COREG) 6.25 MG Tab  Active   fluconazole (DIFLUCAN) 150 MG tablet  Active   FLUoxetine (PROZAC) 10 MG Cap  Active   metFORMIN (GLUCOPHAGE) 500 MG Tab  Active   Misc. Devices Misc  Active   Naltrexone-buPROPion HCl ER 8-90 MG TABLET SR 12 HR  Active   nitrofurantoin (MACROBID) 100 MG Cap  Active   NON SPECIFIED  Active   ONE TOUCH ULTRA TEST strip  Active   ONETOUCH DELICA LANCETS 33G Misc  Active   pioglitazone (ACTOS) 15 MG Tab  Active   polymixin-trimethoprim (POLYTRIM) 90939-1.1 UNIT/ML-% Solution  Active   vitamin D (CHOLECALCIFEROL) 1000 UNIT Tab  Active              ALLERGIES  Allergies   Allergen Reactions   • Ceclor [Cefaclor] Vomiting   • Cinnamon Vomiting   • Eggs Vomiting   • Latex Hives   • Peppers [Pepper-Bell Food Allergy] Vomiting   • Pineapple Hives and Nausea     Only skin       PHYSICAL EXAM    VITAL SIGNS:   Vitals:    03/18/22 2359 03/19/22 0009   BP: (!) 177/96    Pulse: 87    Resp: 16    Temp: 36 °C (96.8 °F)    TempSrc: Temporal    SpO2: 93%    Weight:  (!) 136 kg (300 lb 7.8 oz)   Height: 1.626 m (5'  "4\") 1.626 m (5' 4\")       Vitals: My interpretation: Hypertensive, not tachycardic, afebrile, not hypoxic    Reinterpretation of vitals: Unchanged    Cardiac Monitor Interpretation: The cardiac monitor revealed normal Sinus Rhythm as interpreted by me. The cardiac monitor was ordered secondary to the patient's history of hyperglycemia and to monitor for dysrhythmia and/or tachycardia.    PE:   Constitutional: Well developed, Well nourished, No acute distress, Non-toxic appearance.   HENT: Normocephalic, Atraumatic, Bilateral external ears normal, Oropharynx is clear mucous membranes are moist. No oral exudates or nasal discharge.   Eyes: Pupils are equal round and reactive, EOMI, Conjunctiva normal, No discharge.   Neck: Normal range of motion, No tenderness, Supple, No stridor. No meningismus.  Lymphatic: No lymphadenopathy noted.   Cardiovascular: Regular rate and rhythm without murmur rub or gallop.  Thorax & Lungs: Clear breath sounds bilaterally without wheezes, rhonchi or rales. There is no chest wall tenderness.   Abdomen: Soft non-tender non-distended. There is no rebound or guarding. No organomegaly is appreciated. Bowel sounds are normal.  Skin: Normal without rash.   Back: No CVA or spinal tenderness.   Extremities: Intact distal pulses, No edema, No tenderness, No cyanosis, No clubbing. Capillary refill is less than 2 seconds.  Musculoskeletal: Good range of motion in all major joints. No tenderness to palpation or major deformities noted.   Neurologic: Alert & oriented x 3, Normal motor function, Normal sensory function, No focal deficits noted. Reflexes are normal.  Psychiatric: Affect normal, Judgment normal, Mood normal. There is no suicidal ideation or patient reported hallucinations.     DIAGNOSTIC STUDIES / PROCEDURES    LABS  Results for orders placed or performed during the hospital encounter of 03/19/22   CBC WITH DIFFERENTIAL   Result Value Ref Range    WBC 9.6 4.8 - 10.8 K/uL    RBC 4.98 4.20 " - 5.40 M/uL    Hemoglobin 15.3 12.0 - 16.0 g/dL    Hematocrit 46.0 37.0 - 47.0 %    MCV 92.4 81.4 - 97.8 fL    MCH 30.7 27.0 - 33.0 pg    MCHC 33.3 (L) 33.6 - 35.0 g/dL    RDW 42.4 35.9 - 50.0 fL    Platelet Count 235 164 - 446 K/uL    MPV 10.5 9.0 - 12.9 fL    Neutrophils-Polys 59.60 44.00 - 72.00 %    Lymphocytes 29.20 22.00 - 41.00 %    Monocytes 7.70 0.00 - 13.40 %    Eosinophils 2.40 0.00 - 6.90 %    Basophils 0.70 0.00 - 1.80 %    Immature Granulocytes 0.40 0.00 - 0.90 %    Nucleated RBC 0.00 /100 WBC    Neutrophils (Absolute) 5.69 2.00 - 7.15 K/uL    Lymphs (Absolute) 2.79 1.00 - 4.80 K/uL    Monos (Absolute) 0.74 0.00 - 0.85 K/uL    Eos (Absolute) 0.23 0.00 - 0.51 K/uL    Baso (Absolute) 0.07 0.00 - 0.12 K/uL    Immature Granulocytes (abs) 0.04 0.00 - 0.11 K/uL    NRBC (Absolute) 0.00 K/uL   COMP METABOLIC PANEL   Result Value Ref Range    Sodium 135 135 - 145 mmol/L    Potassium 4.3 3.6 - 5.5 mmol/L    Chloride 98 96 - 112 mmol/L    Co2 23 20 - 33 mmol/L    Anion Gap 14.0 7.0 - 16.0    Glucose 420 (H) 65 - 99 mg/dL    Bun 9 8 - 22 mg/dL    Creatinine 0.59 0.50 - 1.40 mg/dL    Calcium 9.3 8.5 - 10.5 mg/dL    AST(SGOT) 80 (H) 12 - 45 U/L    ALT(SGPT) 75 (H) 2 - 50 U/L    Alkaline Phosphatase 141 (H) 30 - 99 U/L    Total Bilirubin 0.2 0.1 - 1.5 mg/dL    Albumin 4.1 3.2 - 4.9 g/dL    Total Protein 7.4 6.0 - 8.2 g/dL    Globulin 3.3 1.9 - 3.5 g/dL    A-G Ratio 1.2 g/dL   URINALYSIS CULTURE, IF INDICATED    Specimen: Urine   Result Value Ref Range    Color Yellow     Character Cloudy (A)     Specific Gravity >=1.045 (A) <1.035    Ph 5.0 5.0 - 8.0    Glucose >=1000 (A) Negative mg/dL    Ketones Trace (A) Negative mg/dL    Protein Negative Negative mg/dL    Bilirubin Negative Negative    Urobilinogen, Urine 0.2 Negative    Nitrite Negative Negative    Leukocyte Esterase Trace (A) Negative    Occult Blood Negative Negative    Micro Urine Req Microscopic    VENOUS BLOOD GAS   Result Value Ref Range    Venous Bg Ph  7.36 7.31 - 7.45    Venous Bg Pco2 42.6 41.0 - 51.0 mmHg    Venous Bg Po2 38.1 25.0 - 40.0 mmHg    Venous Bg O2 Saturation 70.3 %    Venous Bg Hco3 24 24 - 28 mmol/L    Venous Bg Base Excess -2 mmol/L    Body Temp see below Centigrade   ESTIMATED GFR   Result Value Ref Range    GFR (CKD-EPI) 110 >60 mL/min/1.73 m 2   URINE MICROSCOPIC (W/UA)   Result Value Ref Range    -150 (A) /hpf    RBC 0-2 /hpf    Bacteria Negative None /hpf    Epithelial Cells Few /hpf    Hyaline Cast 0-2 /lpf   URINE CULTURE(NEW)    Specimen: Urine   Result Value Ref Range    Significant Indicator NEG     Source UR     Site -     Culture Result -    EKG (NOW)   Result Value Ref Range    Report       [facility]    Test Date:  2022  Pt Name:    GABE WARREN              Department:   MRN:        [id number]                      Room:       [facility]  Gender:     Female                       Technician: [id number]  :        [id number]                   Requested By:EDWARD SRINIVASAN  Order #:    [id number]                    Reading MD: Edward Srinivasan    Measurements  Intervals                                Axis  Rate:       78                           P:          12  WY:         168                          QRS:        11  QRSD:       78                           T:          34  QT:         388  QTc:        442    Interpretive Statements  SINUS RHYTHM  LVH BY VOLTAGE  Compared to ECG 2018 11:34:45  Left ventricular hypertrophy now present  Sinus bradycardia no longer present  Electronically Signed On 3- 3:09:23 PDT by Edward Srinivasan     POCT glucose device results   Result Value Ref Range    POC Glucose, Blood 477 (HH) 65 - 99 mg/dL      All labs reviewed by me. Significant for no leukocytosis, no anemia, normal electrolytes, normal renal function, mild transaminitis, hyperglycemia, normal bilirubin, urinalysis with trace ketones, questionable infection, VBG completely  normal    RADIOLOGY  No orders to display     The radiologist's interpretation of all radiological studies have been reviewed by me.    COURSE & MEDICAL DECISION MAKING  Nursing notes, VS, PMSFHx, labs, imaging, EKG reviewed in chart.    MDM: 12:45 AM Logan Mahoney is a 52 y.o. female who presented with asymptomatic hyperglycemia.  Patient had routine blood work done which showed a glucose of 530 and decreased bicarbonate at her outpatient PCP and they called her and told her to come to the ED for further evaluation.  She does have type 2 diabetes, takes Metformin.  Upon arrival here she states that she has had no symptoms or signs of DKA, no polydipsia, no polyuria.  She is being treated currently for urinary tract infection.  Her labs here are very reassuring with a normal VBG, CBC unremarkable, CMP with hyperglycemia but no anion gap, no decrease the patient's CO2.  Uncomplicated hyperglycemia which was treated with 10 units of insulin, 2 liters of fluid and 20 mEq of potassium.  Patient's urinalysis does have questionable traces of infection.  I discussed starting the patient on new antibiotic at this time she would like to wait and follow-up on urine culture with PCP.  I also discussed admitting her for her hyperglycemia so she could transition to insulin potentially until her infection is potentially straightened out, but this time she has very close follow-up with PCP and can see her on Monday, which is only less than 48 hours from now.  I discussed that she should check her blood sugar regularly and return to the ED if it is becoming significantly elevated.  Patient also noted to have a transaminitis on her last few blood checks which I recommend checking out with her PCP as well.  Patient denies any signs or symptoms of COVID and is vaccinated.  She has no tenderness in the right upper quadrant.  She is well-appearing otherwise, ambulatory, no acute distress with normal vital signs and a repeat  benign physical exam at time of discharge.  She verbalized understanding plan to follow-up with outpatient PCP.    Patient has had high blood pressure while in the emergency department, felt likely secondary to medical condition. Counseled patient to monitor blood pressure at home and follow up with primary care physician.      FINAL IMPRESSION  1. Hyperglycemia Acute   2. Transaminitis Acute       Ammy TAM (Scribe), am scribing for, and in the presence of, Chemo Darby.    Electronically signed by: Ammy Hendrickson (Sherrieibyazmin), 3/19/2022    IChemo personally performed the services described in this documentation, as scribed by Ammy Hendrickson in my presence, and it is both accurate and complete.    The note accurately reflects work and decisions made by me.  Chemo Darby  3/19/2022  3:11 AM

## 2022-03-19 NOTE — DISCHARGE INSTRUCTIONS
As we discussed, if you think you are not able to get into see your PCP in 48 hours, I do want to return to the ED.  Your blood sugar may be elevated because you have a urinary tract infection.  I sent a urine culture that I want your doctor to follow-up on and it should be back in 2 days, you can see on MyChart.  The culture will tell you what antibiotic will work best for your potential urinary tract infection.  Is also possible that you cleared the infection.  Continue take your diabetes medications as directed.  Come back if your symptoms worsen.  Thank you for coming in today.

## 2022-03-21 ENCOUNTER — OFFICE VISIT (OUTPATIENT)
Dept: MEDICAL GROUP | Facility: LAB | Age: 52
End: 2022-03-21
Payer: COMMERCIAL

## 2022-03-21 VITALS
HEIGHT: 65 IN | TEMPERATURE: 96.5 F | DIASTOLIC BLOOD PRESSURE: 60 MMHG | WEIGHT: 293 LBS | BODY MASS INDEX: 48.82 KG/M2 | OXYGEN SATURATION: 92 % | HEART RATE: 88 BPM | SYSTOLIC BLOOD PRESSURE: 120 MMHG

## 2022-03-21 DIAGNOSIS — E66.01 MORBID OBESITY (HCC): ICD-10-CM

## 2022-03-21 DIAGNOSIS — E11.9 CONTROLLED TYPE 2 DIABETES MELLITUS WITHOUT COMPLICATION, WITHOUT LONG-TERM CURRENT USE OF INSULIN (HCC): ICD-10-CM

## 2022-03-21 LAB
BACTERIA UR CULT: NORMAL
BACTERIA UR CULT: NORMAL
SIGNIFICANT IND 70042: NORMAL
SIGNIFICANT IND 70042: NORMAL
SITE SITE: NORMAL
SITE SITE: NORMAL
SOURCE SOURCE: NORMAL
SOURCE SOURCE: NORMAL

## 2022-03-21 PROCEDURE — 99213 OFFICE O/P EST LOW 20 MIN: CPT | Performed by: INTERNAL MEDICINE

## 2022-03-21 RX ORDER — PIOGLITAZONEHYDROCHLORIDE 30 MG/1
30 TABLET ORAL
Qty: 90 TABLET | Refills: 3 | Status: SHIPPED | OUTPATIENT
Start: 2022-03-21 | End: 2022-11-11

## 2022-03-21 ASSESSMENT — FIBROSIS 4 INDEX: FIB4 SCORE: 1.97

## 2022-03-22 NOTE — PROGRESS NOTES
CC: Logan Mahoney is a 52 y.o. female is suffering from   Chief Complaint   Patient presents with   • Hospital Follow-up   • Diabetes Follow-up         SUBJECTIVE:  1. Controlled type 2 diabetes mellitus without complication, without long-term current use of insulin (HCC)  Pamela is here for follow-up was found to have elevated blood sugar.  I have talked to the patient about going on insulin which was declined.  We will have the patient start on semaglutide, will also increase up Actos, patient's working on diet exercise    2. Morbid obesity (HCC)  Patient and I have discussed the importance of her working on diet exercise to hopefully decrease her weight        Past social, family, history:   Social History     Tobacco Use   • Smoking status: Never Smoker   • Smokeless tobacco: Never Used   Vaping Use   • Vaping Use: Never used   Substance Use Topics   • Alcohol use: No   • Drug use: No         MEDICATIONS:    Current Outpatient Medications:   •  Semaglutide,0.25 or 0.5MG/DOS, 2 MG/1.5ML Solution Pen-injector, Inject 0.25 mg under the skin every 7 days. After four weeks 0.5 mg each week., Disp: 1.5 mL, Rfl: 5  •  pioglitazone (ACTOS) 30 MG Tab, Take 1 Tablet by mouth every day., Disp: 90 Tablet, Rfl: 3  •  carvedilol (COREG) 6.25 MG Tab, Take 1 Tablet by mouth 2 times a day with meals., Disp: 180 Tablet, Rfl: 3  •  fluconazole (DIFLUCAN) 150 MG tablet, Take 1 Tablet by mouth every day., Disp: 2 Tablet, Rfl: 0  •  nitrofurantoin (MACROBID) 100 MG Cap, Take 1 Capsule by mouth 2 times a day., Disp: 10 Capsule, Rfl: 0  •  FLUoxetine (PROZAC) 10 MG Cap, Take 1 Capsule by mouth every day., Disp: 30 Capsule, Rfl: 2  •  Naltrexone-buPROPion HCl ER 8-90 MG TABLET SR 12 HR, Take 1 Tablet by mouth see administration instructions. Start with 1 tablet x 7 days then twice daily x7 days then 2 p.o. every morning and 1 p.o. every afternoon then 2 p.o. twice daily, Disp: 100 Tablet, Rfl: 3  •   polymixin-trimethoprim (POLYTRIM) 68815-1.1 UNIT/ML-% Solution, Administer 1 Drop into the left eye 4 times a day., Disp: 10 mL, Rfl: 0  •  metFORMIN (GLUCOPHAGE) 500 MG Tab, Take 500 mg by mouth 2 times a day with meals., Disp: , Rfl:   •  Misc. Devices Misc, Nocturnal desaturation study., Disp: 1 Each, Rfl: 0  •  Blood Glucose Monitoring Suppl (ONE TOUCH ULTRA 2) w/Device Kit, USE FOR BLOOD SUGAR TEST UTD, Disp: , Rfl: 0  •  ONE TOUCH ULTRA TEST strip, USE ONE STRIP FOR BLOOD SUGAR TEST D AND PRN, Disp: , Rfl: 3  •  ONETOUCH DELICA LANCETS 33G Misc, USE ONE LANCET FOR BLOOD SUGAR TEST D AND PRN, Disp: , Rfl: 3  •  NON SPECIFIED, Glucometer and Test Strips and related supplies as covered by insurance.  QS for 90 days check qd and prn, Disp: 150 Each, Rfl: 3  •  vitamin D (CHOLECALCIFEROL) 1000 UNIT Tab, Take 1,000 Units by mouth every day., Disp: , Rfl:     PROBLEMS:  Patient Active Problem List    Diagnosis Date Noted   • DM II (diabetes mellitus, type II), controlled ([facility]) 06/12/2019   • Vitamin D deficiency disease 12/02/2016   • Morbid obesity ([facility]) 05/31/2016   • S/P gastric surgery 04/18/2016   • Benign hypertension 07/20/2012   • Preop cardiovascular exam 07/20/2012   • Nonspecific abnormal electrocardiogram (ECG) (EKG) 07/20/2012       REVIEW OF SYSTEMS:  Gen.:  No Nausea, Vomiting, fever, Chills.  Heart: No chest pain.  Lungs:  No shortness of Breath.  Psychological: Rinku unusual Anxiety depression     PHYSICAL EXAM   Constitutional: Alert, cooperative, not in acute distress.  Cardiovascular:  Rate Rhythm is regular without murmurs rubs clicks.     Thorax & Lungs: Clear to auscultation, no wheezing, rhonchi, or rales  HENT: Normocephalic, Atraumatic.  Eyes: PERRLA, EOMI, Conjunctiva normal.   Neck: Trachia is midline no swelling of the thyroid.   Lymphatic: No lymphadenopathy noted.   Neurologic: Alert & oriented x 3, cranial nerves II through XII are intact, Normal motor function, Normal sensory  "function, No focal deficits noted.   Psychiatric: Affect normal, Judgment normal, Mood normal.     VITAL SIGNS:/60 (BP Location: Left arm, Patient Position: Sitting, BP Cuff Size: Adult long)   Pulse 88   Temp 35.8 °C (96.5 °F) (Temporal)   Ht 1.638 m (5' 4.5\")   Wt (!) 135 kg (297 lb 6.4 oz)   LMP 03/01/2016   SpO2 92%   BMI 50.26 kg/m²     Labs: Reviewed    Assessment:                                                     Plan:    1. Controlled type 2 diabetes mellitus without complication, without long-term current use of insulin (HCC)  Start Ozempic, no evidence of diabetic neuropathy to monofilament wire testing  - Semaglutide,0.25 or 0.5MG/DOS, 2 MG/1.5ML Solution Pen-injector; Inject 0.25 mg under the skin every 7 days. After four weeks 0.5 mg each week.  Dispense: 1.5 mL; Refill: 5  - pioglitazone (ACTOS) 30 MG Tab; Take 1 Tablet by mouth every day.  Dispense: 90 Tablet; Refill: 3  - Comp Metabolic Panel; Future  - Diabetic Monofilament Lower Extremity Exam  - Microalbumin Creat Ratio Urine - Lab Collect; Future    2. Morbid obesity (HCC)  Discussed again the importance of working on diet exercise Ozempic may help with weight control, increase Actos  - Patient identified as having weight management issue.  Appropriate orders and counseling given.        "

## 2022-03-23 ENCOUNTER — PATIENT MESSAGE (OUTPATIENT)
Dept: MEDICAL GROUP | Facility: LAB | Age: 52
End: 2022-03-23
Payer: COMMERCIAL

## 2022-03-23 ENCOUNTER — HOSPITAL ENCOUNTER (OUTPATIENT)
Dept: RADIOLOGY | Facility: MEDICAL CENTER | Age: 52
End: 2022-03-23
Attending: INTERNAL MEDICINE
Payer: COMMERCIAL

## 2022-03-23 DIAGNOSIS — Z12.31 VISIT FOR SCREENING MAMMOGRAM: ICD-10-CM

## 2022-03-23 PROCEDURE — 77063 BREAST TOMOSYNTHESIS BI: CPT

## 2022-03-25 ENCOUNTER — GYNECOLOGY VISIT (OUTPATIENT)
Dept: OBGYN | Facility: CLINIC | Age: 52
End: 2022-03-25
Payer: COMMERCIAL

## 2022-03-25 VITALS — SYSTOLIC BLOOD PRESSURE: 140 MMHG | DIASTOLIC BLOOD PRESSURE: 89 MMHG | WEIGHT: 293 LBS | BODY MASS INDEX: 50.36 KG/M2

## 2022-03-25 DIAGNOSIS — N95.2 VAGINAL ATROPHY: ICD-10-CM

## 2022-03-25 DIAGNOSIS — Z01.419 WELL WOMAN EXAM: ICD-10-CM

## 2022-03-25 PROCEDURE — 99396 PREV VISIT EST AGE 40-64: CPT | Performed by: OBSTETRICS & GYNECOLOGY

## 2022-03-25 RX ORDER — CONJUGATED ESTROGENS 0.62 MG/G
CREAM VAGINAL
Qty: 30 G | Refills: 3 | Status: SHIPPED | OUTPATIENT
Start: 2022-03-25

## 2022-03-25 ASSESSMENT — FIBROSIS 4 INDEX: FIB4 SCORE: 1.97

## 2022-03-25 NOTE — NON-PROVIDER
Pt here annual exam: LAST PAP 3/18/2021 WNL   Pt states itchiness, was given a two day medication which patient took already and finished.    [facility]# [phone] (home)   [phone] (work)  Pharmacy verified

## 2022-03-25 NOTE — PROGRESS NOTES
Chief complaint: Annual exam    Logan Mahoney is a 52 y.o. No obstetric history on file. female who presents for her Annual Gynecologic Exam      HPI Comments: Pt presents for her annual well woman exam.  Patient reports some pain with intercourse, especially vaginal dryness.  She has been menopausal for many years.    Patient's last menstrual period was 03/01/2016.    Mammogram up-to-date      Review of Systems:   Pertinent positives documented in HPI and all other systems reviewed & are negative    PGYN Hx: As above    All PMH, PSH, allergies, social history and FH reviewed and updated today:  Past Medical History:   Diagnosis Date   • Anemia    • Anesthesia     ponv   • Arthritis     knees/neck/elbows   • Benign hypertension 7/20/2012   • Cancer ([facility]) 2019    Melanoma   • DM II (diabetes mellitus, type II), controlled ([facility]) 6/12/2019   • Heart burn     abdominal   • Morbid obesity ([facility]) 5/31/2016   • Nonspecific abnormal electrocardiogram (ECG) (EKG) 7/20/2012   • Preop cardiovascular exam 7/20/2012   • S/P gastric surgery 4/18/2016    [facility].    • Vitamin D deficiency disease 12/2/2016     Past Surgical History:   Procedure Laterality Date   • LAPAROSCOPIC BAND REMOVAL  11/5/2018    Procedure: LAPAROSCOPIC BAND REMOVAL- GASTRIC BAND and Port;  Surgeon: John H Ganser, M.D.;  Location: SURGERY [facility];  Service: General   • OTHER ABDOMINAL SURGERY  2013    gastric band w/port   • KNEE ARTHROSCOPY  1990    Arthroscopy, Knee R   • TONSILLECTOMY       Medications:   Current Outpatient Medications Ordered in Epic   Medication Sig Dispense Refill   • estrogens, conjugated (PREMARIN) 0.625 MG/GM Cream Insert 0.5 g vaginally, once daily for 3 weeks.  Then use every other day 30 g 3   • Semaglutide,0.25 or 0.5MG/DOS, 2 MG/1.5ML Solution Pen-injector Inject 0.25 mg under the skin every 7 days. After four weeks 0.5 mg each week. 1.5 mL 5   • pioglitazone (ACTOS) 30 MG Tab Take 1 Tablet by  mouth every day. 90 Tablet 3   • carvedilol (COREG) 6.25 MG Tab Take 1 Tablet by mouth 2 times a day with meals. 180 Tablet 3   • polymixin-trimethoprim (POLYTRIM) 13100-7.1 UNIT/ML-% Solution Administer 1 Drop into the left eye 4 times a day. 10 mL 0   • metFORMIN (GLUCOPHAGE) 500 MG Tab Take 500 mg by mouth 2 times a day with meals.     • Misc. Devices Misc Nocturnal desaturation study. 1 Each 0   • Blood Glucose Monitoring Suppl (ONE TOUCH ULTRA 2) w/Device Kit USE FOR BLOOD SUGAR TEST UTD  0   • ONE TOUCH ULTRA TEST strip USE ONE STRIP FOR BLOOD SUGAR TEST D AND PRN  3   • ONETOUCH DELICA LANCETS 33G Misc USE ONE LANCET FOR BLOOD SUGAR TEST D AND PRN  3   • NON SPECIFIED Glucometer and Test Strips and related supplies as covered by insurance.  QS for 90 days check qd and prn 150 Each 3   • vitamin D (CHOLECALCIFEROL) 1000 UNIT Tab Take 1,000 Units by mouth every day.     • fluconazole (DIFLUCAN) 150 MG tablet Take 1 Tablet by mouth every day. (Patient not taking: Reported on 3/25/2022) 2 Tablet 0   • nitrofurantoin (MACROBID) 100 MG Cap Take 1 Capsule by mouth 2 times a day. (Patient not taking: Reported on 3/25/2022) 10 Capsule 0   • FLUoxetine (PROZAC) 10 MG Cap Take 1 Capsule by mouth every day. (Patient not taking: Reported on 3/25/2022) 30 Capsule 2   • Naltrexone-buPROPion HCl ER 8-90 MG TABLET SR 12 HR Take 1 Tablet by mouth see administration instructions. Start with 1 tablet x 7 days then twice daily x7 days then 2 p.o. every morning and 1 p.o. every afternoon then 2 p.o. twice daily (Patient not taking: Reported on 3/25/2022) 100 Tablet 3     No current Epic-ordered facility-administered medications on file.     Ceclor [cefaclor], Cinnamon, Eggs, Latex, Peppers [pepper-bell food allergy], and Pineapple  Social History     Socioeconomic History   • Marital status:    Tobacco Use   • Smoking status: Never Smoker   • Smokeless tobacco: Never Used   Vaping Use   • Vaping Use: Never used   Substance  and Sexual Activity   • Alcohol use: No   • Drug use: No   • Sexual activity: Yes     Partners: Male     Family History   Problem Relation Age of Onset   • Hypertension Mother    • Heart Attack Father    • Other Father    • Stroke Father    • Hypertension Father    • Heart Attack Paternal Grandfather           Objective:   Vital measurements:  /89 (BP Location: Right arm, Patient Position: Sitting, BP Cuff Size: Adult)   Wt (!) 135 kg (298 lb)   Body mass index is 50.36 kg/m². (Goal BM I>18 <25)    Physical Exam   Nursing note and vitals reviewed.  Constitutional: She is oriented to person, place, and time. She appears well-developed and well-nourished. No distress.     HEENT:   Head: Normocephalic and atraumatic.   Right Ear: External ear normal.   Left Ear: External ear normal.   Nose: Nose normal.   Eyes: Conjunctivae and EOM are normal. Pupils are equal, round, and reactive to light. No scleral icterus.     Neck: Normal range of motion. Neck supple. No tracheal deviation present. No thyromegaly present. No cervical or supraclavicular lymphadenopathy.    Pulmonary/Chest: Effort normal and breath sounds normal. No respiratory distress. She has no wheezes. She has no rales. She exhibits no tenderness.     Cardiovascular: Regular, rate and rhythm. No edema.    Breast:  Symmetrical, normal consistency without masses., No dimpling or skin changes, Normal nipples without discharge, no axillary lymphadenopathy    Abdominal: Soft. Bowel sounds are normal. She exhibits no distension and no mass. No tenderness. She has no rebound and no guarding.     Genitourinary:  Pelvic exam was performed with patient supine.  External genitalia with no abnormal pigmentation, labial fusion, rash, tenderness, lesion or injury to the labia bilaterally.  Atrophic in appearance  BUS normal  Vagina is atrophic in appearance with no lesions, foul discharge, erythema, tenderness or bleeding. No foreign body around the vagina or signs of  injury.   Cervix exhibits no motion tenderness, no discharge and no friability, no lesions.   Uterus is 6 cm not deviated, not enlarged, not fixed and not tender.  Right adnexa displays no mass, no tenderness and no fullness.  Left adnexa displays no mass, no tenderness and no fullness.     Musculoskeletal: Normal range of motion. Non tender. She exhibits no edema and no tenderness.     Lymphadenopathy: She has no cervical or supraclavicular adenopathy.     Neurological: She is alert and oriented to person, place, and time. She exhibits normal muscle tone.     Skin: Skin is warm and dry. No rash noted. She is not diaphoretic. No erythema. No pallor.     Psychiatric: She has a normal mood and affect. Her behavior is normal. Judgment and thought content normal.        Assessment:     1. Vaginal atrophy     2. Well woman exam           Plan:   Physical exam performed.  Pap smear up-to-date  Self breast awareness discussed.  Counseling: mammography screening and menopause  Encouraged exercise and proper diet.  Mammograms annually. Patient given order for screening mahendra mammogram.  See medications and orders placed in encounter report.  Weight bearing exercise daily to strengthen bones  Calcium 1200mg daily and 800 IU daily    Discussed with the patient the use of Premarin vaginal cream to help with vaginal atrophy symptoms.  The pros and cons are discussed with the patient.  Patient has been tested for BRCA and is negative.     We will begin nightly therapy for 3 weeks, follow-up at that time.  Prescription sent    All questions answered

## 2022-04-03 ENCOUNTER — PATIENT MESSAGE (OUTPATIENT)
Dept: MEDICAL GROUP | Facility: LAB | Age: 52
End: 2022-04-03
Payer: COMMERCIAL

## 2022-04-04 ENCOUNTER — PATIENT MESSAGE (OUTPATIENT)
Dept: MEDICAL GROUP | Facility: LAB | Age: 52
End: 2022-04-04
Payer: COMMERCIAL

## 2022-04-04 NOTE — TELEPHONE ENCOUNTER
From: Logan Mahoney  To: Physician Manuel Barbosa  Sent: 4/3/2022 11:24 AM PDT  Subject: Reorder One Touch Ultra    Hi Dr. Barbosa,    Could you please send a new prescription to [facility] on [address] for One Touch Ultra test stripes? I don’t have an active script on file to bill insurance.    Thank you,  Logan Mahoney

## 2022-04-15 ENCOUNTER — HOSPITAL ENCOUNTER (OUTPATIENT)
Dept: LAB | Facility: MEDICAL CENTER | Age: 52
End: 2022-04-15
Attending: INTERNAL MEDICINE
Payer: COMMERCIAL

## 2022-04-15 DIAGNOSIS — E11.9 CONTROLLED TYPE 2 DIABETES MELLITUS WITHOUT COMPLICATION, WITHOUT LONG-TERM CURRENT USE OF INSULIN (HCC): ICD-10-CM

## 2022-04-15 LAB
ALBUMIN SERPL BCP-MCNC: 3.8 G/DL (ref 3.2–4.9)
ALBUMIN/GLOB SERPL: 1.2 G/DL
ALP SERPL-CCNC: 87 U/L (ref 30–99)
ALT SERPL-CCNC: 49 U/L (ref 2–50)
ANION GAP SERPL CALC-SCNC: 12 MMOL/L (ref 7–16)
AST SERPL-CCNC: 52 U/L (ref 12–45)
BILIRUB SERPL-MCNC: 0.3 MG/DL (ref 0.1–1.5)
BUN SERPL-MCNC: 12 MG/DL (ref 8–22)
CALCIUM SERPL-MCNC: 9.6 MG/DL (ref 8.5–10.5)
CHLORIDE SERPL-SCNC: 105 MMOL/L (ref 96–112)
CO2 SERPL-SCNC: 23 MMOL/L (ref 20–33)
CREAT SERPL-MCNC: 0.65 MG/DL (ref 0.5–1.4)
CREAT UR-MCNC: 82.14 MG/DL
FASTING STATUS PATIENT QL REPORTED: NORMAL
GFR SERPLBLD CREATININE-BSD FMLA CKD-EPI: 107 ML/MIN/1.73 M 2
GLOBULIN SER CALC-MCNC: 3.1 G/DL (ref 1.9–3.5)
GLUCOSE SERPL-MCNC: 133 MG/DL (ref 65–99)
MICROALBUMIN UR-MCNC: <1.2 MG/DL
MICROALBUMIN/CREAT UR: NORMAL MG/G (ref 0–30)
POTASSIUM SERPL-SCNC: 4.4 MMOL/L (ref 3.6–5.5)
PROT SERPL-MCNC: 6.9 G/DL (ref 6–8.2)
SODIUM SERPL-SCNC: 140 MMOL/L (ref 135–145)

## 2022-04-15 PROCEDURE — 82570 ASSAY OF URINE CREATININE: CPT

## 2022-04-15 PROCEDURE — 82043 UR ALBUMIN QUANTITATIVE: CPT

## 2022-04-15 PROCEDURE — 36415 COLL VENOUS BLD VENIPUNCTURE: CPT

## 2022-04-15 PROCEDURE — 80053 COMPREHEN METABOLIC PANEL: CPT

## 2022-04-19 ENCOUNTER — OFFICE VISIT (OUTPATIENT)
Dept: MEDICAL GROUP | Facility: LAB | Age: 52
End: 2022-04-19
Payer: COMMERCIAL

## 2022-04-19 VITALS
SYSTOLIC BLOOD PRESSURE: 132 MMHG | HEART RATE: 76 BPM | RESPIRATION RATE: 16 BRPM | BODY MASS INDEX: 48.82 KG/M2 | DIASTOLIC BLOOD PRESSURE: 74 MMHG | HEIGHT: 65 IN | WEIGHT: 293 LBS | OXYGEN SATURATION: 92 % | TEMPERATURE: 96.6 F

## 2022-04-19 DIAGNOSIS — R89.9 ABNORMAL LABORATORY TEST: ICD-10-CM

## 2022-04-19 DIAGNOSIS — E11.9 CONTROLLED TYPE 2 DIABETES MELLITUS WITHOUT COMPLICATION, WITHOUT LONG-TERM CURRENT USE OF INSULIN (HCC): ICD-10-CM

## 2022-04-19 DIAGNOSIS — E66.01 MORBID OBESITY (HCC): ICD-10-CM

## 2022-04-19 PROCEDURE — 99214 OFFICE O/P EST MOD 30 MIN: CPT | Performed by: INTERNAL MEDICINE

## 2022-04-19 ASSESSMENT — FIBROSIS 4 INDEX: FIB4 SCORE: 1.58

## 2022-04-19 NOTE — PROGRESS NOTES
CC: Logan Mahoney is a 52 y.o. female is suffering from   Chief Complaint   Patient presents with   • Hospital Follow-up         SUBJECTIVE:  1. Abnormal laboratory test  Pamela is here for follow-up had issues with elevated liver enzymes, ended up being hospitalized because of an elevated blood sugar also.  Patient had a preceding urinary tract infection which is resolved.  Patient has recently completed labs which show improved liver function testing patient is overall feeling good    2. Controlled type 2 diabetes mellitus without complication, without long-term current use of insulin (HCC)  History of poorly controlled type 2 diabetes.  Patient is on Ozempic, has shown no real recent improvement in her blood sugar levels    3. Morbid obesity (HCC)  History of morbid obesity, encourage patient to continue riding her bicycle as frequently as possible.        Past social, family, history: , 1 son.  Social History     Tobacco Use   • Smoking status: Never Smoker   • Smokeless tobacco: Never Used   Vaping Use   • Vaping Use: Never used   Substance Use Topics   • Alcohol use: No   • Drug use: No         MEDICATIONS:    Current Outpatient Medications:   •  Blood Glucose Test Strips, Test strips order: Test strips for One Touch Ultra meter. Sig: use qd and prn ssx high or low sugar #100 RF x 3, Disp: 100 Strip, Rfl: 3  •  estrogens, conjugated (PREMARIN) 0.625 MG/GM Cream, Insert 0.5 g vaginally, once daily for 3 weeks.  Then use every other day, Disp: 30 g, Rfl: 3  •  Semaglutide,0.25 or 0.5MG/DOS, 2 MG/1.5ML Solution Pen-injector, Inject 0.25 mg under the skin every 7 days. After four weeks 0.5 mg each week., Disp: 1.5 mL, Rfl: 5  •  pioglitazone (ACTOS) 30 MG Tab, Take 1 Tablet by mouth every day., Disp: 90 Tablet, Rfl: 3  •  carvedilol (COREG) 6.25 MG Tab, Take 1 Tablet by mouth 2 times a day with meals., Disp: 180 Tablet, Rfl: 3  •  fluconazole (DIFLUCAN) 150 MG tablet, Take 1 Tablet by mouth every  day. (Patient not taking: Reported on 3/25/2022), Disp: 2 Tablet, Rfl: 0  •  nitrofurantoin (MACROBID) 100 MG Cap, Take 1 Capsule by mouth 2 times a day. (Patient not taking: Reported on 3/25/2022), Disp: 10 Capsule, Rfl: 0  •  FLUoxetine (PROZAC) 10 MG Cap, Take 1 Capsule by mouth every day. (Patient not taking: Reported on 3/25/2022), Disp: 30 Capsule, Rfl: 2  •  Naltrexone-buPROPion HCl ER 8-90 MG TABLET SR 12 HR, Take 1 Tablet by mouth see administration instructions. Start with 1 tablet x 7 days then twice daily x7 days then 2 p.o. every morning and 1 p.o. every afternoon then 2 p.o. twice daily (Patient not taking: Reported on 3/25/2022), Disp: 100 Tablet, Rfl: 3  •  polymixin-trimethoprim (POLYTRIM) 84305-3.1 UNIT/ML-% Solution, Administer 1 Drop into the left eye 4 times a day., Disp: 10 mL, Rfl: 0  •  metFORMIN (GLUCOPHAGE) 500 MG Tab, Take 500 mg by mouth 2 times a day with meals., Disp: , Rfl:   •  Misc. Devices Misc, Nocturnal desaturation study., Disp: 1 Each, Rfl: 0  •  Blood Glucose Monitoring Suppl (ONE TOUCH ULTRA 2) w/Device Kit, USE FOR BLOOD SUGAR TEST UTD, Disp: , Rfl: 0  •  ONE TOUCH ULTRA TEST strip, USE ONE STRIP FOR BLOOD SUGAR TEST D AND PRN, Disp: , Rfl: 3  •  ONETOUCH DELICA LANCETS 33G Misc, USE ONE LANCET FOR BLOOD SUGAR TEST D AND PRN, Disp: , Rfl: 3  •  NON SPECIFIED, Glucometer and Test Strips and related supplies as covered by insurance.  QS for 90 days check qd and prn, Disp: 150 Each, Rfl: 3  •  vitamin D (CHOLECALCIFEROL) 1000 UNIT Tab, Take 1,000 Units by mouth every day., Disp: , Rfl:     PROBLEMS:  Patient Active Problem List    Diagnosis Date Noted   • Vaginal atrophy 03/25/2022   • Well woman exam 03/25/2022   • DM II (diabetes mellitus, type II), controlled ([facility]) 06/12/2019   • Vitamin D deficiency disease 12/02/2016   • Morbid obesity (HCC) 05/31/2016   • S/P gastric surgery 04/18/2016   • Benign hypertension 07/20/2012   • Preop cardiovascular exam 07/20/2012   •  "Nonspecific abnormal electrocardiogram (ECG) (EKG) 07/20/2012       REVIEW OF SYSTEMS:  Gen.:  No Nausea, Vomiting, fever, Chills.  Heart: No chest pain.  Lungs:  No shortness of Breath.  Psychological: Rinku unusual Anxiety depression     PHYSICAL EXAM   Constitutional: Alert, cooperative, not in acute distress.  Cardiovascular:  Rate Rhythm is regular without murmurs rubs clicks.     Thorax & Lungs: Clear to auscultation, no wheezing, rhonchi, or rales  HENT: Normocephalic, Atraumatic.  Eyes: PERRLA, EOMI, Conjunctiva normal.   Neck: Trachia is midline no swelling of the thyroid.   Lymphatic: No lymphadenopathy noted.   Neurologic: Alert & oriented x 3, cranial nerves II through XII are intact, Normal motor function, Normal sensory function, No focal deficits noted.   Psychiatric: Affect normal, Judgment normal, Mood normal.     VITAL SIGNS:/74   Pulse 76   Temp 35.9 °C (96.6 °F)   Resp 16   Ht 1.638 m (5' 4.5\")   Wt (!) 136 kg (299 lb)   LMP 03/01/2016   SpO2 92%   BMI 50.53 kg/m²     Labs: Reviewed    Assessment:                                                     Plan:    1. Abnormal laboratory test  Significant improvement in liver function testing, continued mild abnormalities recheck comprehensive metabolic panel in 2 months  - Comp Metabolic Panel; Future    2. Controlled type 2 diabetes mellitus without complication, without long-term current use of insulin (HCC)  Patient with significant improvement in her blood sugars with use of Ozempic, continue, patient has shown me her blood sugar record which shows significant improvement overall  - HEMOGLOBIN A1C; Future    3. Morbid obesity (HCC)  History of morbid obesity encourage patient to continue to work on diet exercise        "

## 2022-04-20 ENCOUNTER — GYNECOLOGY VISIT (OUTPATIENT)
Dept: OBGYN | Facility: CLINIC | Age: 52
End: 2022-04-20
Payer: COMMERCIAL

## 2022-04-20 VITALS — SYSTOLIC BLOOD PRESSURE: 164 MMHG | WEIGHT: 293 LBS | DIASTOLIC BLOOD PRESSURE: 86 MMHG | BODY MASS INDEX: 50.87 KG/M2

## 2022-04-20 DIAGNOSIS — N95.2 VAGINAL ATROPHY: ICD-10-CM

## 2022-04-20 PROCEDURE — 99213 OFFICE O/P EST LOW 20 MIN: CPT | Performed by: OBSTETRICS & GYNECOLOGY

## 2022-04-20 ASSESSMENT — FIBROSIS 4 INDEX: FIB4 SCORE: 1.58

## 2022-04-20 NOTE — NON-PROVIDER
Pt here for follow up : Vaginal Atrophy and premarin cream   Pt states cream has been working well.   [facility]# [phone]   Pharmacy verified

## 2022-04-20 NOTE — PROGRESS NOTES
Chief Complaint   Patient presents with   • Follow-Up     Annual Follow up on vaginal atrophy    Chief complaint: Follow-up vaginal atrophy    History of present illness:   52 y.o. following up for vaginal atrophy treatment with Premarin.  She reports an almost 100% improvement on her symptoms.  Very happy with results.  No other complaints at this time    ROS: Pertinent positives documented in HPI and all other systems reviewed & are negative    All PMH, PSH, meds, allergies, social history and FH reviewed and updated today:  Past Medical History:   Diagnosis Date   • Anemia    • Anesthesia     ponv   • Arthritis     knees/neck/elbows   • Benign hypertension 7/20/2012   • Cancer ([facility]) 2019    Melanoma   • DM II (diabetes mellitus, type II), controlled ([facility]) 6/12/2019   • Heart burn     abdominal   • Morbid obesity ([facility]) 5/31/2016   • Nonspecific abnormal electrocardiogram (ECG) (EKG) 7/20/2012   • Preop cardiovascular exam 7/20/2012   • S/P gastric surgery 4/18/2016    [facility].    • Vitamin D deficiency disease 12/2/2016       Past Surgical History:   Procedure Laterality Date   • LAPAROSCOPIC BAND REMOVAL  11/5/2018    Procedure: LAPAROSCOPIC BAND REMOVAL- GASTRIC BAND and Port;  Surgeon: John H Ganser, M.D.;  Location: SURGERY [facility];  Service: General   • OTHER ABDOMINAL SURGERY  2013    gastric band w/port   • KNEE ARTHROSCOPY  1990    Arthroscopy, Knee R   • TONSILLECTOMY         Allergies:   Allergies   Allergen Reactions   • Ceclor [Cefaclor] Vomiting   • Cinnamon Vomiting   • Eggs Vomiting   • Latex Hives   • Peppers [Pepper-Bell Food Allergy] Vomiting   • Pineapple Hives and Nausea     Only skin       Social History     Socioeconomic History   • Marital status:      Spouse name: Not on file   • Number of children: Not on file   • Years of education: Not on file   • Highest education level: Not on file   Occupational History   • Not on file   Tobacco Use   • Smoking status:  Never Smoker   • Smokeless tobacco: Never Used   Vaping Use   • Vaping Use: Never used   Substance and Sexual Activity   • Alcohol use: No   • Drug use: No   • Sexual activity: Yes     Partners: Male   Other Topics Concern   • Not on file   Social History Narrative   • Not on file     Social Determinants of Health     Financial Resource Strain: Not on file   Food Insecurity: Not on file   Transportation Needs: Not on file   Physical Activity: Not on file   Stress: Not on file   Social Connections: Not on file   Intimate Partner Violence: Not on file   Housing Stability: Not on file       Family History   Problem Relation Age of Onset   • Hypertension Mother    • Heart Attack Father    • Other Father    • Stroke Father    • Hypertension Father    • Heart Attack Paternal Grandfather        Physical exam:  BP (!) 164/86 (BP Location: Left arm, Patient Position: Sitting, BP Cuff Size: Adult)   Wt (!) 137 kg (301 lb)     GENERAL APPEARANCE: healthy, alert, no distress  NEURO Awake, alert and oriented x 3, Normal gait, no sensory deficits  SKIN No rashes, or ulcers or lesions seen  PSYCHIATRIC: Patient shows appropriate affect, is alert and oriented x3, intact judgment and insight.      Assessment:  1. Vaginal atrophy         Plan:    Patient extremely happy with results.  We will continue therapy at this time but may decrease frequency to every other day.  If she feels that this is not controlling her symptoms, she may go back to daily application.    All questions answered

## 2022-05-19 ENCOUNTER — APPOINTMENT (RX ONLY)
Dept: URBAN - METROPOLITAN AREA CLINIC 22 | Facility: CLINIC | Age: 52
Setting detail: DERMATOLOGY
End: 2022-05-19

## 2022-05-19 DIAGNOSIS — L73.8 OTHER SPECIFIED FOLLICULAR DISORDERS: ICD-10-CM

## 2022-05-19 DIAGNOSIS — D22 MELANOCYTIC NEVI: ICD-10-CM

## 2022-05-19 DIAGNOSIS — Z85.828 PERSONAL HISTORY OF OTHER MALIGNANT NEOPLASM OF SKIN: ICD-10-CM

## 2022-05-19 DIAGNOSIS — L81.4 OTHER MELANIN HYPERPIGMENTATION: ICD-10-CM

## 2022-05-19 DIAGNOSIS — Z71.89 OTHER SPECIFIED COUNSELING: ICD-10-CM

## 2022-05-19 DIAGNOSIS — D18.0 HEMANGIOMA: ICD-10-CM

## 2022-05-19 DIAGNOSIS — L82.1 OTHER SEBORRHEIC KERATOSIS: ICD-10-CM

## 2022-05-19 PROBLEM — D22.5 MELANOCYTIC NEVI OF TRUNK: Status: ACTIVE | Noted: 2022-05-19

## 2022-05-19 PROBLEM — D18.01 HEMANGIOMA OF SKIN AND SUBCUTANEOUS TISSUE: Status: ACTIVE | Noted: 2022-05-19

## 2022-05-19 PROCEDURE — 99213 OFFICE O/P EST LOW 20 MIN: CPT

## 2022-05-19 PROCEDURE — ? SUNSCREEN RECOMMENDATIONS

## 2022-05-19 PROCEDURE — ? COUNSELING

## 2022-05-19 ASSESSMENT — LOCATION DETAILED DESCRIPTION DERM
LOCATION DETAILED: EPIGASTRIC SKIN
LOCATION DETAILED: NASAL ROOT
LOCATION DETAILED: RIGHT SUPERIOR LATERAL MALAR CHEEK
LOCATION DETAILED: LEFT SUPERIOR MEDIAL UPPER BACK
LOCATION DETAILED: RIGHT SUPERIOR MEDIAL MIDBACK

## 2022-05-19 ASSESSMENT — LOCATION SIMPLE DESCRIPTION DERM
LOCATION SIMPLE: RIGHT LOWER BACK
LOCATION SIMPLE: NOSE
LOCATION SIMPLE: ABDOMEN
LOCATION SIMPLE: RIGHT CHEEK
LOCATION SIMPLE: LEFT UPPER BACK

## 2022-05-19 ASSESSMENT — LOCATION ZONE DERM
LOCATION ZONE: NOSE
LOCATION ZONE: TRUNK
LOCATION ZONE: FACE

## 2022-06-10 ENCOUNTER — APPOINTMENT (RX ONLY)
Dept: URBAN - METROPOLITAN AREA CLINIC 22 | Facility: CLINIC | Age: 52
Setting detail: DERMATOLOGY
End: 2022-06-10

## 2022-06-10 DIAGNOSIS — L73.8 OTHER SPECIFIED FOLLICULAR DISORDERS: ICD-10-CM

## 2022-06-10 PROCEDURE — ? ELECTRODESICCATION (COSMETIC)

## 2022-06-10 ASSESSMENT — LOCATION DETAILED DESCRIPTION DERM
LOCATION DETAILED: LEFT LATERAL MALAR CHEEK
LOCATION DETAILED: INFERIOR MID FOREHEAD
LOCATION DETAILED: LEFT INFERIOR FOREHEAD
LOCATION DETAILED: RIGHT INFERIOR CENTRAL MALAR CHEEK
LOCATION DETAILED: RIGHT MEDIAL FOREHEAD
LOCATION DETAILED: RIGHT INFERIOR MEDIAL MALAR CHEEK
LOCATION DETAILED: RIGHT SUPERIOR TEMPLE
LOCATION DETAILED: LEFT INFERIOR LATERAL FOREHEAD
LOCATION DETAILED: RIGHT SUPERIOR MEDIAL BUCCAL CHEEK
LOCATION DETAILED: LEFT SUPERIOR FOREHEAD
LOCATION DETAILED: LEFT FOREHEAD
LOCATION DETAILED: RIGHT SUPERIOR FOREHEAD
LOCATION DETAILED: LEFT LATERAL BUCCAL CHEEK
LOCATION DETAILED: RIGHT LATERAL FOREHEAD
LOCATION DETAILED: LEFT LATERAL FOREHEAD
LOCATION DETAILED: LEFT LOWER CUTANEOUS LIP
LOCATION DETAILED: RIGHT LOWER CUTANEOUS LIP
LOCATION DETAILED: RIGHT LATERAL MALAR CHEEK
LOCATION DETAILED: LEFT CENTRAL MALAR CHEEK
LOCATION DETAILED: LEFT INFERIOR CENTRAL MALAR CHEEK
LOCATION DETAILED: RIGHT SUPERIOR NASAL CHEEK

## 2022-06-10 ASSESSMENT — LOCATION SIMPLE DESCRIPTION DERM
LOCATION SIMPLE: RIGHT LIP
LOCATION SIMPLE: LEFT CHEEK
LOCATION SIMPLE: LEFT FOREHEAD
LOCATION SIMPLE: LEFT LIP
LOCATION SIMPLE: RIGHT TEMPLE
LOCATION SIMPLE: RIGHT FOREHEAD
LOCATION SIMPLE: INFERIOR FOREHEAD
LOCATION SIMPLE: RIGHT CHEEK

## 2022-06-10 ASSESSMENT — LOCATION ZONE DERM
LOCATION ZONE: LIP
LOCATION ZONE: FACE

## 2022-06-10 NOTE — PROCEDURE: ELECTRODESICCATION (COSMETIC)
Price (Use Numbers Only, No Special Characters Or $): 200
Detail Level: Zone
Braun: 0.4
Consent: The patient's consent was obtained including but not limited to risks of crusting, scabbing, blistering, scarring, darker or lighter pigmentary change, recurrence, incomplete removal and infection.
Post-Care Instructions: I reviewed with the patient in detail post-care instructions. Patient is to wear sunprotection, and avoid picking at any of the treated lesions. Pt may apply Vaseline to crusted or scabbing areas
Anesthesia Volume In Cc: 0
Azeb Baker  (RN)  2021 06:08:22

## 2022-06-17 ENCOUNTER — HOSPITAL ENCOUNTER (OUTPATIENT)
Dept: LAB | Facility: MEDICAL CENTER | Age: 52
End: 2022-06-17
Attending: INTERNAL MEDICINE
Payer: COMMERCIAL

## 2022-06-17 DIAGNOSIS — R89.9 ABNORMAL LABORATORY TEST: ICD-10-CM

## 2022-06-17 DIAGNOSIS — E11.9 CONTROLLED TYPE 2 DIABETES MELLITUS WITHOUT COMPLICATION, WITHOUT LONG-TERM CURRENT USE OF INSULIN (HCC): ICD-10-CM

## 2022-06-17 LAB
ALBUMIN SERPL BCP-MCNC: 4 G/DL (ref 3.2–4.9)
ALBUMIN/GLOB SERPL: 1.1 G/DL
ALP SERPL-CCNC: 80 U/L (ref 30–99)
ALT SERPL-CCNC: 32 U/L (ref 2–50)
ANION GAP SERPL CALC-SCNC: 12 MMOL/L (ref 7–16)
AST SERPL-CCNC: 32 U/L (ref 12–45)
BILIRUB SERPL-MCNC: 0.3 MG/DL (ref 0.1–1.5)
BUN SERPL-MCNC: 19 MG/DL (ref 8–22)
CALCIUM SERPL-MCNC: 9.2 MG/DL (ref 8.5–10.5)
CHLORIDE SERPL-SCNC: 105 MMOL/L (ref 96–112)
CO2 SERPL-SCNC: 24 MMOL/L (ref 20–33)
CREAT SERPL-MCNC: 0.71 MG/DL (ref 0.5–1.4)
EST. AVERAGE GLUCOSE BLD GHB EST-MCNC: 140 MG/DL
FASTING STATUS PATIENT QL REPORTED: NORMAL
GFR SERPLBLD CREATININE-BSD FMLA CKD-EPI: 103 ML/MIN/1.73 M 2
GLOBULIN SER CALC-MCNC: 3.5 G/DL (ref 1.9–3.5)
GLUCOSE SERPL-MCNC: 112 MG/DL (ref 65–99)
HBA1C MFR BLD: 6.5 % (ref 4–5.6)
POTASSIUM SERPL-SCNC: 4.7 MMOL/L (ref 3.6–5.5)
PROT SERPL-MCNC: 7.5 G/DL (ref 6–8.2)
SODIUM SERPL-SCNC: 141 MMOL/L (ref 135–145)

## 2022-06-17 PROCEDURE — 36415 COLL VENOUS BLD VENIPUNCTURE: CPT

## 2022-06-17 PROCEDURE — 80053 COMPREHEN METABOLIC PANEL: CPT

## 2022-06-17 PROCEDURE — 83036 HEMOGLOBIN GLYCOSYLATED A1C: CPT

## 2022-06-18 ENCOUNTER — HOSPITAL ENCOUNTER (OUTPATIENT)
Dept: RADIOLOGY | Facility: MEDICAL CENTER | Age: 52
End: 2022-06-18
Attending: PHYSICIAN ASSISTANT
Payer: COMMERCIAL

## 2022-06-18 ENCOUNTER — OFFICE VISIT (OUTPATIENT)
Dept: URGENT CARE | Facility: PHYSICIAN GROUP | Age: 52
End: 2022-06-18
Payer: COMMERCIAL

## 2022-06-18 VITALS
HEART RATE: 74 BPM | RESPIRATION RATE: 16 BRPM | HEIGHT: 64 IN | OXYGEN SATURATION: 100 % | TEMPERATURE: 98.6 F | WEIGHT: 293 LBS | BODY MASS INDEX: 50.02 KG/M2 | SYSTOLIC BLOOD PRESSURE: 118 MMHG | DIASTOLIC BLOOD PRESSURE: 78 MMHG

## 2022-06-18 DIAGNOSIS — S80.02XA CONTUSION OF LEFT KNEE, INITIAL ENCOUNTER: ICD-10-CM

## 2022-06-18 PROCEDURE — 99213 OFFICE O/P EST LOW 20 MIN: CPT | Performed by: PHYSICIAN ASSISTANT

## 2022-06-18 PROCEDURE — 73562 X-RAY EXAM OF KNEE 3: CPT | Mod: LT

## 2022-06-18 RX ORDER — PIOGLITAZONEHYDROCHLORIDE 15 MG/1
TABLET ORAL
COMMUNITY
Start: 2022-04-19 | End: 2022-06-18

## 2022-06-18 ASSESSMENT — ENCOUNTER SYMPTOMS
DIARRHEA: 0
COUGH: 0
CHILLS: 0
FEVER: 0
CONSTIPATION: 0
NAUSEA: 0
EYE PAIN: 0
HEADACHES: 0
MYALGIAS: 0
SORE THROAT: 0
ABDOMINAL PAIN: 0
VOMITING: 0
SHORTNESS OF BREATH: 0

## 2022-06-18 ASSESSMENT — FIBROSIS 4 INDEX: FIB4 SCORE: 1.2

## 2022-06-18 NOTE — PROGRESS NOTES
"Subjective:   Logan Mahoney is a 52 y.o. female who presents for Knee Injury (Last night fell hurt lt knee, last two steps of stairs )      This is a pleasant 52-year-old female who was attending a play yesterday and when descending stairs she missed stepped causing her to fall directly on the anterolateral aspect of the left knee.  The knee continued to ache despite icing and anti-inflammatories.  She is able to bear weight but notes that it is painful.  It does not feel unstable.  She has not noted any numbness or tingling      Review of Systems   Constitutional: Negative for chills and fever.   HENT: Negative for congestion, ear pain and sore throat.    Eyes: Negative for pain.   Respiratory: Negative for cough and shortness of breath.    Cardiovascular: Negative for chest pain.   Gastrointestinal: Negative for abdominal pain, constipation, diarrhea, nausea and vomiting.   Genitourinary: Negative for dysuria.   Musculoskeletal: Negative for myalgias.   Skin: Negative for rash.   Neurological: Negative for headaches.       Medications, Allergies, and current problem list reviewed today in Epic.     Objective:     /78 (BP Location: Left arm, Patient Position: Sitting, BP Cuff Size: Large adult)   Pulse 74   Temp 37 °C (98.6 °F)   Resp 16   Ht 1.626 m (5' 4\")   Wt (!) 137 kg (301 lb)   SpO2 100%     Physical Exam  Vitals reviewed.   Constitutional:       Appearance: Normal appearance.   HENT:      Head: Normocephalic and atraumatic.      Right Ear: External ear normal.      Left Ear: External ear normal.      Nose: Nose normal.      Mouth/Throat:      Mouth: Mucous membranes are moist.   Eyes:      Conjunctiva/sclera: Conjunctivae normal.   Cardiovascular:      Rate and Rhythm: Normal rate.   Pulmonary:      Effort: Pulmonary effort is normal.   Musculoskeletal:      Comments: Superficial abrasion over left anterior tibial plateau with trace tenderness, trace knee effusion with ballottement of " the patella.  No crepitance or significant deformity, nontender over medial and lateral joint line.  No ligamentous laxity appreciated.  Ambulatory with an antalgic gait.  Distally neurovascularly intact.  Negative anterior/posterior drawer   Skin:     General: Skin is warm and dry.      Capillary Refill: Capillary refill takes less than 2 seconds.   Neurological:      Mental Status: She is alert and oriented to person, place, and time.         RADIOLOGY RESULTS   DX-KNEE 3 VIEWS LEFT    Result Date: 6/18/2022 6/18/2022 11:00 AM HISTORY/REASON FOR EXAM:  Fall yesterday with anterior and lateral left knee pain inferior to the patella.. TECHNIQUE/EXAM DESCRIPTION AND NUMBER OF VIEWS:  3 views of the LEFT knee. COMPARISON: None FINDINGS: There is a small suprapatellar joint effusion. There is no evidence of displaced  fracture or dislocation. There is departmental joint space narrowing with marginal osteophytes, subchondral cystic changes and sclerosis. There is an incidental bipartite patella which is a normal anatomic variation.     1.  No acute displaced fracture of the left knee. 2.  There is a small suprapatellar joint effusion.           Assessment/Plan:     Diagnosis and associated orders:     1. Contusion of left knee, initial encounter  DX-KNEE 3 VIEWS LEFT      Comments/MDM:     • Given the patient's significant osteoarthritis of which she has had some inflammatory Tory changes and is experiencing some pain.  She has no instability on exam.  She does have history of right-sided knee pain and has braces as well as crutches and other supportive devices at home that she would like to trial.  Recommend follow-up with her orthopedist if she is not showing improvement, discuss supportive care such as ice and elevation etc. which she is very familiar with.  Thankfully no sign of neurovascular compromise         Differential diagnosis, natural history, supportive care, and indications for immediate follow-up  discussed.    Advised the patient to follow-up with the primary care physician for recheck, reevaluation, and consideration of further management.    Please note that this dictation was created using voice recognition software. I have made a reasonable attempt to correct obvious errors, but I expect that there are errors of grammar and possibly content that I did not discover before finalizing the note.    This note was electronically signed by Caden Goncalves PA-C

## 2022-06-20 ENCOUNTER — OFFICE VISIT (OUTPATIENT)
Dept: MEDICAL GROUP | Facility: LAB | Age: 52
End: 2022-06-20
Payer: COMMERCIAL

## 2022-06-20 VITALS
TEMPERATURE: 96.7 F | OXYGEN SATURATION: 95 % | BODY MASS INDEX: 50.02 KG/M2 | HEART RATE: 74 BPM | WEIGHT: 293 LBS | SYSTOLIC BLOOD PRESSURE: 122 MMHG | DIASTOLIC BLOOD PRESSURE: 74 MMHG | HEIGHT: 64 IN

## 2022-06-20 DIAGNOSIS — E11.9 CONTROLLED TYPE 2 DIABETES MELLITUS WITHOUT COMPLICATION, WITHOUT LONG-TERM CURRENT USE OF INSULIN (HCC): ICD-10-CM

## 2022-06-20 PROCEDURE — 99213 OFFICE O/P EST LOW 20 MIN: CPT | Performed by: INTERNAL MEDICINE

## 2022-06-20 ASSESSMENT — FIBROSIS 4 INDEX: FIB4 SCORE: 1.2

## 2022-06-20 NOTE — PROGRESS NOTES
CC: Logan Mahoney is a 52 y.o. female is suffering from   Chief Complaint   Patient presents with   • Follow-Up   • Medication Refill         SUBJECTIVE:  1. Controlled type 2 diabetes mellitus without complication, without long-term current use of insulin (HCC)  Lakesha is here for follow-up, and is doing an excellent job on her weight, also with her diabetic control shows significant improvement        Past social, family, history: , special   Social History     Tobacco Use   • Smoking status: Never Smoker   • Smokeless tobacco: Never Used   Vaping Use   • Vaping Use: Never used   Substance Use Topics   • Alcohol use: No   • Drug use: No         MEDICATIONS:    Current Outpatient Medications:   •  Blood Glucose Test Strips, Test strips order: Test strips for One Touch Ultra meter. Sig: use qd and prn ssx high or low sugar #100 RF x 3, Disp: 100 Strip, Rfl: 3  •  estrogens, conjugated (PREMARIN) 0.625 MG/GM Cream, Insert 0.5 g vaginally, once daily for 3 weeks.  Then use every other day, Disp: 30 g, Rfl: 3  •  Semaglutide,0.25 or 0.5MG/DOS, 2 MG/1.5ML Solution Pen-injector, Inject 0.25 mg under the skin every 7 days. After four weeks 0.5 mg each week., Disp: 1.5 mL, Rfl: 5  •  pioglitazone (ACTOS) 30 MG Tab, Take 1 Tablet by mouth every day., Disp: 90 Tablet, Rfl: 3  •  carvedilol (COREG) 6.25 MG Tab, Take 1 Tablet by mouth 2 times a day with meals., Disp: 180 Tablet, Rfl: 3  •  metFORMIN (GLUCOPHAGE) 500 MG Tab, Take 500 mg by mouth 2 times a day with meals., Disp: , Rfl:   •  Misc. Devices Misc, Nocturnal desaturation study., Disp: 1 Each, Rfl: 0  •  Blood Glucose Monitoring Suppl (ONE TOUCH ULTRA 2) w/Device Kit, USE FOR BLOOD SUGAR TEST UTD, Disp: , Rfl: 0  •  ONE TOUCH ULTRA TEST strip, USE ONE STRIP FOR BLOOD SUGAR TEST D AND PRN, Disp: , Rfl: 3  •  ONETOUCH DELICA LANCETS 33G Misc, USE ONE LANCET FOR BLOOD SUGAR TEST D AND PRN, Disp: , Rfl: 3  •  NON SPECIFIED, Glucometer and Test  Strips and related supplies as covered by insurance.  QS for 90 days check qd and prn, Disp: 150 Each, Rfl: 3  •  vitamin D (CHOLECALCIFEROL) 1000 UNIT Tab, Take 1,000 Units by mouth every day., Disp: , Rfl:   •  fluconazole (DIFLUCAN) 150 MG tablet, Take 1 Tablet by mouth every day., Disp: 2 Tablet, Rfl: 0  •  nitrofurantoin (MACROBID) 100 MG Cap, Take 1 Capsule by mouth 2 times a day., Disp: 10 Capsule, Rfl: 0  •  FLUoxetine (PROZAC) 10 MG Cap, Take 1 Capsule by mouth every day., Disp: 30 Capsule, Rfl: 2  •  Naltrexone-buPROPion HCl ER 8-90 MG TABLET SR 12 HR, Take 1 Tablet by mouth see administration instructions. Start with 1 tablet x 7 days then twice daily x7 days then 2 p.o. every morning and 1 p.o. every afternoon then 2 p.o. twice daily, Disp: 100 Tablet, Rfl: 3  •  polymixin-trimethoprim (POLYTRIM) 31853-3.1 UNIT/ML-% Solution, Administer 1 Drop into the left eye 4 times a day., Disp: 10 mL, Rfl: 0    PROBLEMS:  Patient Active Problem List    Diagnosis Date Noted   • Vaginal atrophy 03/25/2022   • Well woman exam 03/25/2022   • DM II (diabetes mellitus, type II), controlled ([facility]) 06/12/2019   • Vitamin D deficiency disease 12/02/2016   • Morbid obesity ([facility]) 05/31/2016   • S/P gastric surgery 04/18/2016   • Benign hypertension 07/20/2012   • Preop cardiovascular exam 07/20/2012   • Nonspecific abnormal electrocardiogram (ECG) (EKG) 07/20/2012       REVIEW OF SYSTEMS:  Gen.:  No Nausea, Vomiting, fever, Chills.  Heart: No chest pain.  Lungs:  No shortness of Breath.  Psychological: Rinku unusual Anxiety depression     PHYSICAL EXAM   Constitutional: Alert, cooperative, not in acute distress.  Cardiovascular:  Rate Rhythm is regular without murmurs rubs clicks.     Thorax & Lungs: Clear to auscultation, no wheezing, rhonchi, or rales  HENT: Normocephalic, Atraumatic.  Eyes: PERRLA, EOMI, Conjunctiva normal.   Neck: Trachia is midline no swelling of the thyroid.   Lymphatic: No lymphadenopathy noted.  "  Neurologic: Alert & oriented x 3, cranial nerves II through XII are intact, Normal motor function, Normal sensory function, No focal deficits noted.   Psychiatric: Affect normal, Judgment normal, Mood normal.     VITAL SIGNS:/74 (BP Location: Right arm, Patient Position: Sitting, BP Cuff Size: Adult long)   Pulse 74   Temp 35.9 °C (96.7 °F) (Temporal)   Ht 1.626 m (5' 4\")   Wt (!) 134 kg (296 lb)   LMP 03/01/2016   SpO2 95%   BMI 50.81 kg/m²     Labs: Reviewed    Assessment:                                                     Plan:    1. Controlled type 2 diabetes mellitus without complication, without long-term current use of insulin ([facility])  Controlled type 2 diabetes recheck hemoglobin A1c encourage patient to continue to work on weight loss  - HEMOGLOBIN A1C; Future        "

## 2022-10-19 ENCOUNTER — TELEPHONE (OUTPATIENT)
Dept: MEDICAL GROUP | Facility: LAB | Age: 52
End: 2022-10-19
Payer: COMMERCIAL

## 2022-10-19 NOTE — TELEPHONE ENCOUNTER
Colorectal Care Gap Outreach    1. Confirmed patient is between the ages of 50-75: YES     2. Confirmed that patient IS overdue or due soon for colorectal cancer screening: YES     3. Were orders placed within the last 12 months to complete screening: YES     Phone Number Called: [phone]  Call outcome: reminded patient of existing order for Cologuard. Lvm to remind to complete if received     _____________________________________________________________________    Colon Cancer Screening Guidelines:     Important: If patient has any history of colon polyps or family history of colorectal cancer, FIT and Cologuard are NOT appropriate options. A colonoscopy is the recommended test for this set of patients.    Colonoscopy  Always recommend colonoscopy first.   A colonoscopy is recommended over the other tests because it provides direct visualization of the colon and if there are small polyps these can also be removed with one procedure.  If negative and no family history, could be cleared for 10 years.     Cologuard/FIT  Cologuard is completed once every 3 years.  FIT is completed annually.  If positive, Cologuard and FIT will require a diagnostic colonoscopy. Screening colonoscopies are classically covered by insurances, however, diagnostic colonoscopies may result in a bill.

## 2022-11-03 ENCOUNTER — PATIENT MESSAGE (OUTPATIENT)
Dept: MEDICAL GROUP | Facility: LAB | Age: 52
End: 2022-11-03
Payer: COMMERCIAL

## 2022-11-03 ENCOUNTER — TELEPHONE (OUTPATIENT)
Dept: HEALTH INFORMATION MANAGEMENT | Facility: OTHER | Age: 52
End: 2022-11-03
Payer: COMMERCIAL

## 2022-11-03 DIAGNOSIS — E11.9 CONTROLLED TYPE 2 DIABETES MELLITUS WITHOUT COMPLICATION, WITHOUT LONG-TERM CURRENT USE OF INSULIN (HCC): ICD-10-CM

## 2022-11-03 DIAGNOSIS — E66.01 MORBID OBESITY (HCC): ICD-10-CM

## 2022-11-04 ENCOUNTER — PATIENT MESSAGE (OUTPATIENT)
Dept: MEDICAL GROUP | Facility: LAB | Age: 52
End: 2022-11-04
Payer: COMMERCIAL

## 2022-11-04 RX ORDER — TIRZEPATIDE 2.5 MG/.5ML
INJECTION, SOLUTION SUBCUTANEOUS
Qty: 2.5 ML | Refills: 3 | Status: SHIPPED | OUTPATIENT
Start: 2022-12-03 | End: 2022-11-11

## 2022-11-11 ENCOUNTER — OFFICE VISIT (OUTPATIENT)
Dept: URGENT CARE | Facility: PHYSICIAN GROUP | Age: 52
End: 2022-11-11
Payer: COMMERCIAL

## 2022-11-11 VITALS
HEIGHT: 64 IN | BODY MASS INDEX: 49.51 KG/M2 | DIASTOLIC BLOOD PRESSURE: 64 MMHG | OXYGEN SATURATION: 98 % | SYSTOLIC BLOOD PRESSURE: 118 MMHG | RESPIRATION RATE: 18 BRPM | TEMPERATURE: 97.3 F | HEART RATE: 62 BPM | WEIGHT: 290 LBS

## 2022-11-11 DIAGNOSIS — M25.522 BILATERAL ELBOW JOINT PAIN: ICD-10-CM

## 2022-11-11 DIAGNOSIS — M25.521 BILATERAL ELBOW JOINT PAIN: ICD-10-CM

## 2022-11-11 DIAGNOSIS — M25.561 CHRONIC PAIN OF BOTH KNEES: ICD-10-CM

## 2022-11-11 DIAGNOSIS — G89.29 CHRONIC PAIN OF BOTH KNEES: ICD-10-CM

## 2022-11-11 DIAGNOSIS — M25.562 CHRONIC PAIN OF BOTH KNEES: ICD-10-CM

## 2022-11-11 PROBLEM — M25.50 JOINT PAIN: Status: ACTIVE | Noted: 2022-11-11

## 2022-11-11 PROBLEM — E78.1 PURE HYPERGLYCERIDEMIA: Status: ACTIVE | Noted: 2022-11-11

## 2022-11-11 PROBLEM — K21.9 GASTROESOPHAGEAL REFLUX DISEASE: Status: ACTIVE | Noted: 2022-11-11

## 2022-11-11 PROBLEM — F32.A DEPRESSIVE DISORDER: Status: ACTIVE | Noted: 2022-11-11

## 2022-11-11 PROBLEM — R01.1 HEART MURMUR: Status: ACTIVE | Noted: 2022-11-11

## 2022-11-11 PROBLEM — N97.9 FEMALE INFERTILITY: Status: ACTIVE | Noted: 2022-11-11

## 2022-11-11 PROCEDURE — 99213 OFFICE O/P EST LOW 20 MIN: CPT | Performed by: PHYSICIAN ASSISTANT

## 2022-11-11 RX ORDER — MELOXICAM 15 MG/1
15 TABLET ORAL DAILY
Qty: 30 TABLET | Refills: 0 | Status: SHIPPED | OUTPATIENT
Start: 2022-11-11

## 2022-11-11 ASSESSMENT — FIBROSIS 4 INDEX: FIB4 SCORE: 1.2

## 2022-11-11 ASSESSMENT — ENCOUNTER SYMPTOMS
CONSTITUTIONAL NEGATIVE: 1
NEUROLOGICAL NEGATIVE: 1
CARDIOVASCULAR NEGATIVE: 1
RESPIRATORY NEGATIVE: 1

## 2022-11-11 NOTE — PROGRESS NOTES
"  Subjective:     Logan Mahoney  is a 52 y.o. female who presents for Arthritis (Acting up for 2 weeks, not improving, pain in both knees and elbows)       She presents today with a flareup of \"arthritis\" in bilateral elbows and bilateral knees.  This flareup has been ongoing over the last 2 weeks.  Has longstanding history of chronic joint pain that has been discussed with her primary care provider.  No trauma or injury associated with current flareup.  Does have a history of diabetes.  No history of cardiovascular events.  No radiculopathy symptoms from either the cervical or lumbar spine.  No loss of joint range of motion or muscle strength of the extremities.     Review of Systems   Constitutional: Negative.    Respiratory: Negative.     Cardiovascular: Negative.    Musculoskeletal:  Positive for joint pain.   Neurological: Negative.     Allergies   Allergen Reactions    Ceclor [Cefaclor] Vomiting    Cinnamon Vomiting    Eggs Vomiting    Latex Hives    Peppers [Pepper-Bell Food Allergy] Vomiting    Pineapple Hives and Nausea     Only skin     Past Medical History:   Diagnosis Date    Anemia     Anesthesia     ponv    Arthritis     knees/neck/elbows    Benign hypertension 7/20/2012    Cancer ([facility]) 2019    Melanoma    DM II (diabetes mellitus, type II), controlled ([facility]) 6/12/2019    Heart burn     abdominal    Morbid obesity ([facility]) 5/31/2016    Nonspecific abnormal electrocardiogram (ECG) (EKG) 7/20/2012    Preop cardiovascular exam 7/20/2012    S/P gastric surgery 4/18/2016    [facility]     Vitamin D deficiency disease 12/2/2016        Objective:   /64   Pulse 62   Temp 36.3 °C (97.3 °F)   Resp 18   Ht 1.626 m (5' 4\")   Wt (!) 132 kg (290 lb)   LMP 03/01/2016   SpO2 98%   BMI 49.78 kg/m²   Physical Exam  Vitals and nursing note reviewed.   Constitutional:       General: She is not in acute distress.     Appearance: She is not ill-appearing or toxic-appearing.   HENT:      " Head: Normocephalic.      Nose: No rhinorrhea.   Eyes:      General: No scleral icterus.     Conjunctiva/sclera: Conjunctivae normal.   Pulmonary:      Effort: Pulmonary effort is normal. No respiratory distress.      Breath sounds: No stridor.   Musculoskeletal:      Cervical back: Neck supple.      Comments: Tenderness to palpation over the medial and lateral joint lines of bilateral knees.  No tenderness over the patella, patellar tendon.  Knee ligament testing was normal, bilaterally.  Knee range of motion intact and comparable bilaterally.  Lower extremity strength 5/5 overall muscles, bilaterally    No bony landmark tenderness over the elbow.  Elbow, wrist, hand strength, range of motion intact and comparable bilaterally.   Neurological:      Mental Status: She is alert and oriented to person, place, and time.   Psychiatric:         Mood and Affect: Mood normal.         Behavior: Behavior normal.         Thought Content: Thought content normal.         Judgment: Judgment normal.           Diagnostic testing: None    Assessment/Plan:     Encounter Diagnoses   Name Primary?    Chronic pain of both knees     Bilateral elbow joint pain           Plan for care for today's complaint includes meloxicam for her bilateral elbow and bilateral knee pain.  She denies history of cardiovascular events, no history of blood clots or strokes.  She does have type 2 diabetes so I will withhold from steroids at this time.  Discussed with patient follow-up with primary care provider as this is a chronic issue.  Return to urgent care or follow-up in the emergency department symptoms worsen or become severe..  Prescription for meloxicam provided.    See AVS Instructions below for written guidance provided to patient on after-visit management and care in addition to our verbal discussion during the visit.    Please note that this dictation was created using voice recognition software. I have attempted to correct all errors, but there  may be sound-alike, spelling, grammar and possibly content errors that I did not discover before finalizing the note.    Big Sandyaysha Carter PA-C

## 2022-11-20 ENCOUNTER — PATIENT MESSAGE (OUTPATIENT)
Dept: MEDICAL GROUP | Facility: LAB | Age: 52
End: 2022-11-20
Payer: COMMERCIAL

## 2022-11-20 DIAGNOSIS — E66.01 MORBID OBESITY (HCC): ICD-10-CM

## 2022-11-20 DIAGNOSIS — E11.9 TYPE 2 DIABETES MELLITUS WITHOUT COMPLICATION, WITHOUT LONG-TERM CURRENT USE OF INSULIN (HCC): ICD-10-CM

## 2022-11-23 RX ORDER — TIRZEPATIDE 2.5 MG/.5ML
0.5 INJECTION, SOLUTION SUBCUTANEOUS
Qty: 2 ML | Refills: 3 | Status: SHIPPED | OUTPATIENT
Start: 2022-11-23 | End: 2023-04-19

## 2022-12-03 DIAGNOSIS — E11.9 CONTROLLED TYPE 2 DIABETES MELLITUS WITHOUT COMPLICATION, WITHOUT LONG-TERM CURRENT USE OF INSULIN (HCC): ICD-10-CM

## 2022-12-05 RX ORDER — PIOGLITAZONEHYDROCHLORIDE 15 MG/1
15 TABLET ORAL
Qty: 30 TABLET | Refills: 11 | Status: SHIPPED | OUTPATIENT
Start: 2022-12-05 | End: 2023-08-31

## 2022-12-05 NOTE — TELEPHONE ENCOUNTER
Received request via: Pharmacy    Was the patient seen in the last year in this department? Yes  LOV :6/20/2022   Does the patient have an active prescription (recently filled or refills available) for medication(s) requested? No    Does the patient have shelter Plus and need 100 day supply (blood pressure, diabetes and cholesterol meds only)? Patient does not have SCP

## 2022-12-06 ENCOUNTER — TELEPHONE (OUTPATIENT)
Dept: MEDICAL GROUP | Facility: LAB | Age: 52
End: 2022-12-06
Payer: COMMERCIAL

## 2022-12-06 NOTE — TELEPHONE ENCOUNTER
Colorectal Care Gap Outreach    1. Confirmed patient is between the ages of 50-75: YES     2. Confirmed that patient IS overdue or due soon for colorectal cancer screening: YES     3. Were orders placed within the last 12 months to complete screening: YES     Phone Number Called: [phone]  Call outcome: reminded patient of existing order for Cologuard. Lvm to remind to complete test by 3/17/23.    _____________________________________________________________________    Colon Cancer Screening Guidelines:     Important: If patient has any history of colon polyps or family history of colorectal cancer, FIT and Cologuard are NOT appropriate options. A colonoscopy is the recommended test for this set of patients.    Colonoscopy  Always recommend colonoscopy first.   A colonoscopy is recommended over the other tests because it provides direct visualization of the colon and if there are small polyps these can also be removed with one procedure.  If negative and no family history, could be cleared for 10 years.     Cologuard/FIT  Cologuard is completed once every 3 years.  FIT is completed annually.  If positive, Cologuard and FIT will require a diagnostic colonoscopy. Screening colonoscopies are classically covered by insurances, however, diagnostic colonoscopies may result in a bill.

## 2022-12-08 ENCOUNTER — PATIENT MESSAGE (OUTPATIENT)
Dept: MEDICAL GROUP | Facility: LAB | Age: 52
End: 2022-12-08
Payer: COMMERCIAL

## 2022-12-08 DIAGNOSIS — G89.29 CHRONIC PAIN OF BOTH KNEES: ICD-10-CM

## 2022-12-08 DIAGNOSIS — M25.561 CHRONIC PAIN OF BOTH KNEES: ICD-10-CM

## 2022-12-08 DIAGNOSIS — M25.562 CHRONIC PAIN OF BOTH KNEES: ICD-10-CM

## 2022-12-08 DIAGNOSIS — M25.521 BILATERAL ELBOW JOINT PAIN: ICD-10-CM

## 2022-12-08 DIAGNOSIS — Z13.29 SCREENING FOR THYROID DISORDER: ICD-10-CM

## 2022-12-08 DIAGNOSIS — M25.522 BILATERAL ELBOW JOINT PAIN: ICD-10-CM

## 2022-12-17 ENCOUNTER — HOSPITAL ENCOUNTER (OUTPATIENT)
Dept: LAB | Facility: MEDICAL CENTER | Age: 52
End: 2022-12-17
Attending: INTERNAL MEDICINE
Payer: COMMERCIAL

## 2022-12-17 DIAGNOSIS — Z13.29 SCREENING FOR THYROID DISORDER: ICD-10-CM

## 2022-12-17 DIAGNOSIS — E11.9 CONTROLLED TYPE 2 DIABETES MELLITUS WITHOUT COMPLICATION, WITHOUT LONG-TERM CURRENT USE OF INSULIN (HCC): ICD-10-CM

## 2022-12-17 LAB
EST. AVERAGE GLUCOSE BLD GHB EST-MCNC: 120 MG/DL
HBA1C MFR BLD: 5.8 % (ref 4–5.6)
T4 FREE SERPL-MCNC: 1.01 NG/DL (ref 0.93–1.7)
THYROPEROXIDASE AB SERPL-ACNC: <9 IU/ML (ref 0–9)
TSH SERPL DL<=0.005 MIU/L-ACNC: 1.61 UIU/ML (ref 0.38–5.33)

## 2022-12-17 PROCEDURE — 84443 ASSAY THYROID STIM HORMONE: CPT

## 2022-12-17 PROCEDURE — 83036 HEMOGLOBIN GLYCOSYLATED A1C: CPT

## 2022-12-17 PROCEDURE — 84439 ASSAY OF FREE THYROXINE: CPT

## 2022-12-17 PROCEDURE — 86376 MICROSOMAL ANTIBODY EACH: CPT

## 2022-12-17 PROCEDURE — 36415 COLL VENOUS BLD VENIPUNCTURE: CPT

## 2022-12-28 ENCOUNTER — OFFICE VISIT (OUTPATIENT)
Dept: MEDICAL GROUP | Facility: LAB | Age: 52
End: 2022-12-28
Payer: COMMERCIAL

## 2022-12-28 VITALS
HEART RATE: 74 BPM | TEMPERATURE: 97.9 F | OXYGEN SATURATION: 94 % | BODY MASS INDEX: 50.02 KG/M2 | WEIGHT: 293 LBS | SYSTOLIC BLOOD PRESSURE: 160 MMHG | DIASTOLIC BLOOD PRESSURE: 86 MMHG | RESPIRATION RATE: 16 BRPM | HEIGHT: 64 IN

## 2022-12-28 DIAGNOSIS — E66.01 CLASS 3 SEVERE OBESITY DUE TO EXCESS CALORIES WITHOUT SERIOUS COMORBIDITY WITH BODY MASS INDEX (BMI) OF 50.0 TO 59.9 IN ADULT (HCC): ICD-10-CM

## 2022-12-28 DIAGNOSIS — E11.9 TYPE 2 DIABETES MELLITUS WITHOUT COMPLICATION, WITHOUT LONG-TERM CURRENT USE OF INSULIN (HCC): ICD-10-CM

## 2022-12-28 DIAGNOSIS — E55.9 VITAMIN D DEFICIENCY: ICD-10-CM

## 2022-12-28 PROCEDURE — 99214 OFFICE O/P EST MOD 30 MIN: CPT | Performed by: INTERNAL MEDICINE

## 2022-12-28 RX ORDER — PIOGLITAZONEHYDROCHLORIDE 15 MG/1
TABLET ORAL
COMMUNITY
End: 2023-05-09

## 2022-12-28 ASSESSMENT — FIBROSIS 4 INDEX: FIB4 SCORE: 1.2

## 2022-12-28 NOTE — PROGRESS NOTES
CC: Logan Mahoney is a 52 y.o. female is suffering from   Chief Complaint   Patient presents with    Diabetes     6 months follow up    Thyroid Follow-up         SUBJECTIVE:  1. Type 2 diabetes mellitus without complication, without long-term current use of insulin (HCC)  Lakesha is here for follow-up, has a history of type 2 diabetes with good control.    2. Vitamin D deficiency disease  Continue to monitor vitamin D    3. Class 3 severe obesity due to excess calories without serious comorbidity with body mass index (BMI) of 50.0 to 59.9 in adult (HCC)  Patient and I have discussed possibly increasing her Mounjaro, will address at her next visit      Past social, family, history:   Social History     Tobacco Use    Smoking status: Never    Smokeless tobacco: Never   Vaping Use    Vaping Use: Never used   Substance Use Topics    Alcohol use: No    Drug use: No         MEDICATIONS:    Current Outpatient Medications:     pioglitazone (ACTOS) 15 MG Tab, TAKE 1 TABLET BY MOUTH EVERY DAY, Disp: 30 Tablet, Rfl: 11    Tirzepatide (MOUNJARO) 2.5 MG/0.5ML Solution Pen-injector, Inject 0.5 mL under the skin every 7 days., Disp: 2 mL, Rfl: 3    meloxicam (MOBIC) 15 MG tablet, Take 1 Tablet by mouth every day., Disp: 30 Tablet, Rfl: 0    metFORMIN (GLUCOPHAGE) 500 MG Tab, TAKE 2 TABLETS(1000 MG) BY MOUTH EVERY DAY, Disp: 180 Tablet, Rfl: 3    estrogens, conjugated (PREMARIN) 0.625 MG/GM Cream, Insert 0.5 g vaginally, once daily for 3 weeks.  Then use every other day, Disp: 30 g, Rfl: 3    carvedilol (COREG) 6.25 MG Tab, Take 1 Tablet by mouth 2 times a day with meals., Disp: 180 Tablet, Rfl: 3    vitamin D (CHOLECALCIFEROL) 1000 UNIT Tab, Take 1 Tablet by mouth every day., Disp: , Rfl:     pioglitazone (ACTOS) 15 MG Tab, pioglitazone 15 mg tablet  TAKE 1 TABLET BY MOUTH EVERY DAY, Disp: , Rfl:     Blood Glucose Test Strips, Test strips order: Test strips for One Touch Ultra meter. Sig: use qd and prn ssx high or  low sugar #100 RF x 3, Disp: 100 Strip, Rfl: 3    Misc. Devices Misc, Nocturnal desaturation study., Disp: 1 Each, Rfl: 0    Blood Glucose Monitoring Suppl (ONE TOUCH ULTRA 2) w/Device Kit, USE FOR BLOOD SUGAR TEST UTD, Disp: , Rfl: 0    ONE TOUCH ULTRA TEST strip, USE ONE STRIP FOR BLOOD SUGAR TEST D AND PRN, Disp: , Rfl: 3    ONETOUCH DELICA LANCETS 33G Misc, USE ONE LANCET FOR BLOOD SUGAR TEST D AND PRN, Disp: , Rfl: 3    NON SPECIFIED, Glucometer and Test Strips and related supplies as covered by insurance.  QS for 90 days check qd and prn, Disp: 150 Each, Rfl: 3    PROBLEMS:  Patient Active Problem List    Diagnosis Date Noted    Pure hyperglyceridemia 11/11/2022    Joint pain 11/11/2022    Heart murmur 11/11/2022    Gastroesophageal reflux disease 11/11/2022    Female infertility 11/11/2022    Depressive disorder 11/11/2022    Vaginal atrophy 03/25/2022    Well woman exam 03/25/2022    Diabetes mellitus (HCC) 06/12/2019    Vitamin D deficiency disease 12/02/2016    Morbid obesity (HCC) 05/31/2016    S/P gastric surgery 04/18/2016    Essential hypertension 07/20/2012    Preop cardiovascular exam 07/20/2012    Nonspecific abnormal electrocardiogram (ECG) (EKG) 07/20/2012       REVIEW OF SYSTEMS:  Gen.:  No Nausea, Vomiting, fever, Chills.  Heart: No chest pain.  Lungs:  No shortness of Breath.  Psychological: Rinku unusual Anxiety depression     PHYSICAL EXAM   Constitutional: Alert, cooperative, not in acute distress.  Cardiovascular:  Rate Rhythm is regular without murmurs rubs clicks.     Thorax & Lungs: Clear to auscultation, no wheezing, rhonchi, or rales  HENT: Normocephalic, Atraumatic.  Eyes: PERRLA, EOMI, Conjunctiva normal.   Neck: Trachia is midline no swelling of the thyroid.   Lymphatic: No lymphadenopathy noted.   Neurologic: Alert & oriented x 3, cranial nerves II through XII are intact, Normal motor function, Normal sensory function, No focal deficits noted.   Psychiatric: Affect normal, Judgment  "normal, Mood normal.     VITAL SIGNS:BP (!) 160/86   Pulse 74   Temp 36.6 °C (97.9 °F) (Temporal)   Resp 16   Ht 1.626 m (5' 4\")   Wt (!) 136 kg (300 lb)   LMP 03/01/2016   SpO2 94%   BMI 51.49 kg/m²     Labs: Reviewed    Assessment:                                                     Plan:    1. Type 2 diabetes mellitus without complication, without long-term current use of insulin (HCC)  Continue current medical therapy recheck hemoglobin A1c 3 months  - HEMOGLOBIN A1C; Future    2. Vitamin D deficiency disease  Recheck vitamin D  - VITAMIN D,25 HYDROXY (DEFICIENCY); Future    3. Class 3 severe obesity due to excess calories without serious comorbidity with body mass index (BMI) of 50.0 to 59.9 in adult (HCC)  Consider increasing patient's Mounjaro.     "

## 2023-01-25 ENCOUNTER — TELEPHONE (OUTPATIENT)
Dept: MEDICAL GROUP | Facility: LAB | Age: 53
End: 2023-01-25
Payer: COMMERCIAL

## 2023-01-25 NOTE — TELEPHONE ENCOUNTER
[id number] PRIOR AUTHORIZATION NEEDED:    1. Name of Medication: Monica    2. Requested By (Name of Pharmacy): [facility]     3. Is insurance on file current? yes    4. What is the name & phone number of the 3rd party payor? B

## 2023-02-02 ENCOUNTER — TELEPHONE (OUTPATIENT)
Dept: MEDICAL GROUP | Facility: LAB | Age: 53
End: 2023-02-02
Payer: COMMERCIAL

## 2023-02-02 NOTE — TELEPHONE ENCOUNTER
Colorectal Care Gap Outreach    1. Confirmed patient is between the ages of 50-75: YES     2. Confirmed that patient IS overdue or due soon for colorectal cancer screening: YES     3. Were orders placed within the last 12 months to complete screening: YES     Phone Number Called: [phone]  Call outcome: reminded patient of existing order for Cologuard. Lvm to complete kit before it expires 3/17/23.    _____________________________________________________________________    Colon Cancer Screening Guidelines:     Important: If patient has any history of colon polyps or family history of colorectal cancer, FIT and Cologuard are NOT appropriate options. A colonoscopy is the recommended test for this set of patients.    Colonoscopy  Always recommend colonoscopy first.   A colonoscopy is recommended over the other tests because it provides direct visualization of the colon and if there are small polyps these can also be removed with one procedure.  If negative and no family history, could be cleared for 10 years.     Cologuard/FIT  Cologuard is completed once every 3 years.  FIT is completed annually.  If positive, Cologuard and FIT will require a diagnostic colonoscopy. Screening colonoscopies are classically covered by insurances, however, diagnostic colonoscopies may result in a bill.

## 2023-03-08 RX ORDER — MELOXICAM 15 MG/1
15 TABLET ORAL DAILY
Qty: 30 TABLET | Refills: 0 | OUTPATIENT
Start: 2023-03-08

## 2023-03-15 ENCOUNTER — TELEPHONE (OUTPATIENT)
Dept: MEDICAL GROUP | Facility: LAB | Age: 53
End: 2023-03-15
Payer: COMMERCIAL

## 2023-03-15 NOTE — TELEPHONE ENCOUNTER
I lvm to cb to let us know if had eye exam in last year, and if so, where and when.   I lvm to let her know cologuard kit expires on 3/17/23.

## 2023-03-30 ENCOUNTER — APPOINTMENT (RX ONLY)
Dept: URBAN - METROPOLITAN AREA CLINIC 22 | Facility: CLINIC | Age: 53
Setting detail: DERMATOLOGY
End: 2023-03-30

## 2023-03-30 DIAGNOSIS — L82.1 OTHER SEBORRHEIC KERATOSIS: ICD-10-CM

## 2023-03-30 DIAGNOSIS — L57.0 ACTINIC KERATOSIS: ICD-10-CM

## 2023-03-30 DIAGNOSIS — L91.8 OTHER HYPERTROPHIC DISORDERS OF THE SKIN: ICD-10-CM

## 2023-03-30 DIAGNOSIS — L82.0 INFLAMED SEBORRHEIC KERATOSIS: ICD-10-CM

## 2023-03-30 DIAGNOSIS — L98.8 OTHER SPECIFIED DISORDERS OF THE SKIN AND SUBCUTANEOUS TISSUE: ICD-10-CM | Status: INADEQUATELY CONTROLLED

## 2023-03-30 DIAGNOSIS — D22 MELANOCYTIC NEVI: ICD-10-CM

## 2023-03-30 DIAGNOSIS — L81.4 OTHER MELANIN HYPERPIGMENTATION: ICD-10-CM

## 2023-03-30 DIAGNOSIS — Z85.828 PERSONAL HISTORY OF OTHER MALIGNANT NEOPLASM OF SKIN: ICD-10-CM

## 2023-03-30 DIAGNOSIS — L259 CONTACT DERMATITIS AND OTHER ECZEMA, UNSPECIFIED CAUSE: ICD-10-CM | Status: INADEQUATELY CONTROLLED

## 2023-03-30 DIAGNOSIS — D18.0 HEMANGIOMA: ICD-10-CM

## 2023-03-30 DIAGNOSIS — Z71.89 OTHER SPECIFIED COUNSELING: ICD-10-CM

## 2023-03-30 PROBLEM — L30.8 OTHER SPECIFIED DERMATITIS: Status: ACTIVE | Noted: 2023-03-30

## 2023-03-30 PROBLEM — D22.5 MELANOCYTIC NEVI OF TRUNK: Status: ACTIVE | Noted: 2023-03-30

## 2023-03-30 PROBLEM — D18.01 HEMANGIOMA OF SKIN AND SUBCUTANEOUS TISSUE: Status: ACTIVE | Noted: 2023-03-30

## 2023-03-30 PROCEDURE — ? TREATMENT REGIMEN

## 2023-03-30 PROCEDURE — ? SUNSCREEN RECOMMENDATIONS

## 2023-03-30 PROCEDURE — ? LIQUID NITROGEN

## 2023-03-30 PROCEDURE — 99214 OFFICE O/P EST MOD 30 MIN: CPT | Mod: 25

## 2023-03-30 PROCEDURE — ? ADDITIONAL NOTES

## 2023-03-30 PROCEDURE — ? COUNSELING

## 2023-03-30 PROCEDURE — 17110 DESTRUCTION B9 LES UP TO 14: CPT

## 2023-03-30 PROCEDURE — ? BENIGN DESTRUCTION

## 2023-03-30 PROCEDURE — 17000 DESTRUCT PREMALG LESION: CPT | Mod: 59

## 2023-03-30 PROCEDURE — ? PRESCRIPTION

## 2023-03-30 RX ORDER — HYDROCORTISONE 25 MG/G
CREAM TOPICAL
Qty: 30 | Refills: 0 | Status: ERX

## 2023-03-30 RX ORDER — TRIAMCINOLONE ACETONIDE 1 MG/G
CREAM TOPICAL BID
Qty: 30 | Refills: 0 | Status: ERX

## 2023-03-30 ASSESSMENT — LOCATION ZONE DERM
LOCATION ZONE: NOSE
LOCATION ZONE: ARM
LOCATION ZONE: AXILLAE
LOCATION ZONE: NECK
LOCATION ZONE: HAND
LOCATION ZONE: TRUNK
LOCATION ZONE: FACE
LOCATION ZONE: LEG

## 2023-03-30 ASSESSMENT — LOCATION SIMPLE DESCRIPTION DERM
LOCATION SIMPLE: LEFT AXILLARY VAULT
LOCATION SIMPLE: LEFT EYEBROW
LOCATION SIMPLE: RIGHT POSTERIOR AXILLA
LOCATION SIMPLE: RIGHT HAND
LOCATION SIMPLE: NOSE
LOCATION SIMPLE: LEFT UPPER BACK
LOCATION SIMPLE: RIGHT LOWER BACK
LOCATION SIMPLE: LEFT ANTERIOR NECK
LOCATION SIMPLE: LEFT HAND
LOCATION SIMPLE: ABDOMEN
LOCATION SIMPLE: LEFT CALF
LOCATION SIMPLE: RIGHT SHOULDER

## 2023-03-30 ASSESSMENT — LOCATION DETAILED DESCRIPTION DERM
LOCATION DETAILED: LEFT CENTRAL EYEBROW
LOCATION DETAILED: LEFT AXILLARY VAULT
LOCATION DETAILED: LEFT SUPERIOR MEDIAL UPPER BACK
LOCATION DETAILED: RIGHT POSTERIOR AXILLA
LOCATION DETAILED: EPIGASTRIC SKIN
LOCATION DETAILED: NASAL ROOT
LOCATION DETAILED: RIGHT POSTERIOR SHOULDER
LOCATION DETAILED: LEFT INFERIOR LATERAL NECK
LOCATION DETAILED: RIGHT SUPERIOR MEDIAL MIDBACK
LOCATION DETAILED: LEFT DISTAL CALF
LOCATION DETAILED: RIGHT RADIAL DORSAL HAND
LOCATION DETAILED: LEFT RIB CAGE
LOCATION DETAILED: LEFT RADIAL DORSAL HAND

## 2023-03-30 NOTE — PROCEDURE: BENIGN DESTRUCTION
Include Z78.9 (Other Specified Conditions Influencing Health Status) As An Associated Diagnosis?: No
Consent: The patient's consent was obtained including but not limited to risks of crusting, scabbing, blistering, scarring, darker or lighter pigmentary change, recurrence, incomplete removal and infection.
Duration Of Freeze Thaw-Cycle (Seconds): 5-10
Medical Necessity Clause: This procedure was medically necessary because the lesions that were treated were:
Detail Level: Detailed
Medical Necessity Information: It is in your best interest to select a reason for this procedure from the list below. All of these items fulfill various CMS LCD requirements except the new and changing color options.
Number Of Freeze-Thaw Cycles: 2 freeze-thaw cycles
Post-Care Instructions: I reviewed with the patient in detail post-care instructions. Patient is to wear sunprotection, and avoid picking at any of the treated lesions. Pt may apply Vaseline to crusted or scabbing areas.
Treatment Number (Will Not Render If 0): 0

## 2023-03-30 NOTE — PROCEDURE: LIQUID NITROGEN
Show Topical Anesthesia Variable?: Yes
Application Tool (Optional): Liquid Nitrogen Sprayer
Number Of Freeze-Thaw Cycles: 3 freeze-thaw cycles
Consent: The patient's consent was obtained including but not limited to risks of crusting, scabbing, blistering, scarring, darker or lighter pigmentary change, recurrence, incomplete removal and infection.
Spray Paint Technique: No
Medical Necessity Clause: This procedure was medically necessary because the lesions that were treated were:
Detail Level: Detailed
Post-Care Instructions: I reviewed with the patient in detail post-care instructions. Patient is to wear sunprotection, and avoid picking at any of the treated lesions. Pt may apply Vaseline to crusted or scabbing areas.
Medical Necessity Information: It is in your best interest to select a reason for this procedure from the list below. All of these items fulfill various CMS LCD requirements except the new and changing color options.
Duration Of Freeze Thaw-Cycle (Seconds): 3
Spray Paint Text: The liquid nitrogen was applied to the skin utilizing a spray paint frosting technique.
Number Of Freeze-Thaw Cycles: 2 freeze-thaw cycles

## 2023-03-30 NOTE — PROCEDURE: ADDITIONAL NOTES
Render Risk Assessment In Note?: no
Detail Level: Generalized
Additional Notes: Advised use of a benzyl peroxide wash under armpits.

## 2023-04-04 DIAGNOSIS — I10 ESSENTIAL HYPERTENSION: ICD-10-CM

## 2023-04-04 RX ORDER — CARVEDILOL 6.25 MG/1
TABLET ORAL
Qty: 180 TABLET | Refills: 3 | Status: SHIPPED | OUTPATIENT
Start: 2023-04-04

## 2023-04-04 NOTE — TELEPHONE ENCOUNTER
Received request via: Pharmacy    Was the patient seen in the last year in this department? Yes  LOV 12/28/2022  Does the patient have an active prescription (recently filled or refills available) for medication(s) requested? No    Does the patient have long-term Plus and need 100 day supply (blood pressure, diabetes and cholesterol meds only)? Patient does not have SCP

## 2023-04-15 DIAGNOSIS — E11.9 TYPE 2 DIABETES MELLITUS WITHOUT COMPLICATION, WITHOUT LONG-TERM CURRENT USE OF INSULIN (HCC): ICD-10-CM

## 2023-04-15 DIAGNOSIS — E66.01 MORBID OBESITY (HCC): ICD-10-CM

## 2023-04-17 NOTE — TELEPHONE ENCOUNTER
Received request via: Patient    Was the patient seen in the last year in this department? Yes 12/28/22    Does the patient have an active prescription (recently filled or refills available) for medication(s) requested? No    Does the patient have FPC Plus and need 100 day supply (blood pressure, diabetes and cholesterol meds only)? Patient does not have SCP

## 2023-04-19 RX ORDER — TIRZEPATIDE 2.5 MG/.5ML
INJECTION, SOLUTION SUBCUTANEOUS
Qty: 2 ML | Refills: 3 | Status: SHIPPED | OUTPATIENT
Start: 2023-04-19 | End: 2023-05-09

## 2023-05-09 ENCOUNTER — OFFICE VISIT (OUTPATIENT)
Dept: MEDICAL GROUP | Facility: LAB | Age: 53
End: 2023-05-09
Payer: COMMERCIAL

## 2023-05-09 VITALS
RESPIRATION RATE: 12 BRPM | SYSTOLIC BLOOD PRESSURE: 132 MMHG | TEMPERATURE: 97.4 F | WEIGHT: 293 LBS | OXYGEN SATURATION: 94 % | BODY MASS INDEX: 48.82 KG/M2 | DIASTOLIC BLOOD PRESSURE: 78 MMHG | HEIGHT: 65 IN | HEART RATE: 70 BPM

## 2023-05-09 DIAGNOSIS — E66.01 MORBID OBESITY (HCC): ICD-10-CM

## 2023-05-09 DIAGNOSIS — G89.29 CHRONIC RIGHT SHOULDER PAIN: ICD-10-CM

## 2023-05-09 DIAGNOSIS — M25.511 CHRONIC RIGHT SHOULDER PAIN: ICD-10-CM

## 2023-05-09 PROCEDURE — 99214 OFFICE O/P EST MOD 30 MIN: CPT | Performed by: INTERNAL MEDICINE

## 2023-05-09 RX ORDER — TIRZEPATIDE 5 MG/.5ML
0.5 INJECTION, SOLUTION SUBCUTANEOUS
Qty: 0.5 ML | Refills: 1 | Status: SHIPPED | OUTPATIENT
Start: 2023-05-09 | End: 2023-08-10

## 2023-05-09 RX ORDER — TRIAMCINOLONE ACETONIDE 1 MG/G
CREAM TOPICAL
COMMUNITY
Start: 2023-03-30

## 2023-05-09 ASSESSMENT — FIBROSIS 4 INDEX: FIB4 SCORE: 1.23

## 2023-05-09 ASSESSMENT — PATIENT HEALTH QUESTIONNAIRE - PHQ9: CLINICAL INTERPRETATION OF PHQ2 SCORE: 0

## 2023-05-09 NOTE — PROGRESS NOTES
CC: Logan Mahoney is a 53 y.o. female is suffering from   Chief Complaint   Patient presents with    Shoulder Pain     X 2 months shoulder pain that is worsening         SUBJECTIVE:  1. Morbid obesity (HCC)  Pamela is here for follow-up has a history of morbid obesity, we will increase her Mounjaro up to 5 mg per 0.5 mL.     2. Chronic right shoulder pain  X-rays ordered        Past social, family, history: ,   Social History     Tobacco Use    Smoking status: Never    Smokeless tobacco: Never   Vaping Use    Vaping Use: Never used   Substance Use Topics    Alcohol use: No    Drug use: No         MEDICATIONS:    Current Outpatient Medications:     hydrocortisone 2.5 % Cream topical cream, APPLY TOPICALLY EVERY DAY TO ARMPITS AND AFFECTED AREAS, Disp: , Rfl:     triamcinolone acetonide (KENALOG) 0.1 % Cream, , Disp: , Rfl:     Tirzepatide (MOUNJARO) 5 MG/0.5ML Solution Pen-injector, Inject 0.5 mL under the skin every 7 days., Disp: 0.5 mL, Rfl: 1    carvedilol (COREG) 6.25 MG Tab, TAKE 1 TABLET BY MOUTH TWICE DAILY WITH MEALS, Disp: 180 Tablet, Rfl: 3    pioglitazone (ACTOS) 15 MG Tab, TAKE 1 TABLET BY MOUTH EVERY DAY, Disp: 30 Tablet, Rfl: 11    metFORMIN (GLUCOPHAGE) 500 MG Tab, TAKE 2 TABLETS(1000 MG) BY MOUTH EVERY DAY, Disp: 180 Tablet, Rfl: 3    estrogens, conjugated (PREMARIN) 0.625 MG/GM Cream, Insert 0.5 g vaginally, once daily for 3 weeks.  Then use every other day, Disp: 30 g, Rfl: 3    vitamin D (CHOLECALCIFEROL) 1000 UNIT Tab, Take 1 Tablet by mouth every day., Disp: , Rfl:     meloxicam (MOBIC) 15 MG tablet, Take 1 Tablet by mouth every day. (Patient not taking: Reported on 5/9/2023), Disp: 30 Tablet, Rfl: 0    Blood Glucose Test Strips, Test strips order: Test strips for One Touch Ultra meter. Sig: use qd and prn ssx high or low sugar #100 RF x 3, Disp: 100 Strip, Rfl: 3    Misc. Devices Misc, Nocturnal desaturation study., Disp: 1 Each, Rfl: 0    Blood Glucose  "Monitoring Suppl (ONE TOUCH ULTRA 2) w/Device Kit, USE FOR BLOOD SUGAR TEST UTD, Disp: , Rfl: 0    ONE TOUCH ULTRA TEST strip, USE ONE STRIP FOR BLOOD SUGAR TEST D AND PRN, Disp: , Rfl: 3    ONETOUCH DELICA LANCETS 33G Misc, USE ONE LANCET FOR BLOOD SUGAR TEST D AND PRN, Disp: , Rfl: 3    NON SPECIFIED, Glucometer and Test Strips and related supplies as covered by insurance.  QS for 90 days check qd and prn, Disp: 150 Each, Rfl: 3    PROBLEMS:  Patient Active Problem List    Diagnosis Date Noted    Pure hyperglyceridemia 11/11/2022    Joint pain 11/11/2022    Heart murmur 11/11/2022    Gastroesophageal reflux disease 11/11/2022    Female infertility 11/11/2022    Depressive disorder 11/11/2022    Vaginal atrophy 03/25/2022    Well woman exam 03/25/2022    Diabetes mellitus (HCC) 06/12/2019    Vitamin D deficiency disease 12/02/2016    Morbid obesity (HCC) 05/31/2016    S/P gastric surgery 04/18/2016    Essential hypertension 07/20/2012    Preop cardiovascular exam 07/20/2012    Nonspecific abnormal electrocardiogram (ECG) (EKG) 07/20/2012       REVIEW OF SYSTEMS:  Gen.:  No Nausea, Vomiting, fever, Chills.  Heart: No chest pain.  Lungs:  No shortness of Breath.  Psychological: Rinku unusual Anxiety depression     PHYSICAL EXAM   Constitutional: Alert, cooperative, not in acute distress.  Cardiovascular:  Rate Rhythm is regular without murmurs rubs clicks.     Thorax & Lungs: Clear to auscultation, no wheezing, rhonchi, or rales  HENT: Normocephalic, Atraumatic.  Eyes: PERRLA, EOMI, Conjunctiva normal.   Neck: Trachia is midline no swelling of the thyroid.   Lymphatic: No lymphadenopathy noted.   Neurologic: Alert & oriented x 3, cranial nerves II through XII are intact, Normal motor function, Normal sensory function, No focal deficits noted.   Psychiatric: Affect normal, Judgment normal, Mood normal.     VITAL SIGNS:/78   Pulse 70   Temp 36.3 °C (97.4 °F) (Temporal)   Resp 12   Ht 1.638 m (5' 4.5\")   Wt " (!) 142 kg (313 lb)   LMP 03/01/2016   SpO2 94%   BMI 52.90 kg/m²     Labs: Reviewed    Assessment:                                                     Plan:    1. Morbid obesity (HCC)  Increase Mounjaro  - Tirzepatide (MOUNJARO) 5 MG/0.5ML Solution Pen-injector; Inject 0.5 mL under the skin every 7 days.  Dispense: 0.5 mL; Refill: 1    2. Chronic right shoulder pain  X-rays ordered right shoulder referral written to physical therapy, patient will retain range of motion in internal/external rotation and abduction  - DX-SHOULDER 2+ RIGHT; Future  - Referral to Physical Therapy

## 2023-05-12 ENCOUNTER — PATIENT MESSAGE (OUTPATIENT)
Dept: MEDICAL GROUP | Facility: LAB | Age: 53
End: 2023-05-12

## 2023-05-12 ENCOUNTER — APPOINTMENT (OUTPATIENT)
Dept: RADIOLOGY | Facility: MEDICAL CENTER | Age: 53
End: 2023-05-12
Attending: INTERNAL MEDICINE
Payer: COMMERCIAL

## 2023-05-12 DIAGNOSIS — G89.29 CHRONIC RIGHT SHOULDER PAIN: ICD-10-CM

## 2023-05-12 DIAGNOSIS — M25.511 CHRONIC RIGHT SHOULDER PAIN: ICD-10-CM

## 2023-05-12 PROCEDURE — 73030 X-RAY EXAM OF SHOULDER: CPT | Mod: RT

## 2023-08-10 ENCOUNTER — OFFICE VISIT (OUTPATIENT)
Dept: MEDICAL GROUP | Facility: LAB | Age: 53
End: 2023-08-10
Payer: COMMERCIAL

## 2023-08-10 VITALS
BODY MASS INDEX: 48.82 KG/M2 | HEART RATE: 84 BPM | DIASTOLIC BLOOD PRESSURE: 86 MMHG | OXYGEN SATURATION: 95 % | TEMPERATURE: 98.6 F | HEIGHT: 65 IN | SYSTOLIC BLOOD PRESSURE: 144 MMHG | WEIGHT: 293 LBS | RESPIRATION RATE: 16 BRPM

## 2023-08-10 DIAGNOSIS — E11.9 TYPE 2 DIABETES MELLITUS WITHOUT COMPLICATION, WITHOUT LONG-TERM CURRENT USE OF INSULIN (HCC): ICD-10-CM

## 2023-08-10 DIAGNOSIS — I10 ESSENTIAL HYPERTENSION: ICD-10-CM

## 2023-08-10 DIAGNOSIS — E66.01 MORBID OBESITY (HCC): ICD-10-CM

## 2023-08-10 PROCEDURE — 99214 OFFICE O/P EST MOD 30 MIN: CPT | Performed by: FAMILY MEDICINE

## 2023-08-10 PROCEDURE — 3077F SYST BP >= 140 MM HG: CPT | Performed by: FAMILY MEDICINE

## 2023-08-10 PROCEDURE — 3079F DIAST BP 80-89 MM HG: CPT | Performed by: FAMILY MEDICINE

## 2023-08-10 RX ORDER — SEMAGLUTIDE 1.34 MG/ML
1 INJECTION, SOLUTION SUBCUTANEOUS
Qty: 3 ML | Refills: 3 | Status: SHIPPED | OUTPATIENT
Start: 2023-08-10 | End: 2023-08-31

## 2023-08-10 ASSESSMENT — FIBROSIS 4 INDEX: FIB4 SCORE: 1.23

## 2023-08-10 NOTE — PROGRESS NOTES
Chief Complaint:   Chief Complaint   Patient presents with    Weight Loss       HPI:Established patient   Logan Mahoney is a 53 y.o. female who presents for initial weight management visit.  Discussed the following today:  1. Morbid obesity   History of present illness:  53-year-old female with morbid obesity has been suffering from obesity and struggling with weight for long time since she was very young.  Status post band surgery in the past with complications so it was removed.  She continues to follow-up with Dr. Ganser at [facility].  Past medical history is also significant for diabetes and hypertension.      Reviewed with her today the following:    Goal: Patient would like to have healthier weight.        Diet: No specific dietary restrictions, trying to eat high-protein and high-fiber diet and low-carb  Exercise and physical activity: Patient said because of her weight she is not able to do aggressive exercise, but she started going to the gym recently in the past 1 week, and she does a lot of cardio exercise    Fluids intake and water: Drinks around 100 ounces of water daily.    Medications: Reviewed medication, discussed with the patient to discuss with her PCP to get off Actos because it might cause water retention and weight gain.  And will restart patient on Ozempic      Previous trials for weight loss: As above    Behavioral/emotional history:Challenges and barriers to weight loss:  Physical barriers related to lower extremity pain, its patient is still able to walk and do some exercise    Lab review: Reviewed labs and thyroid function tests within normal limits, most recent A1c below 6.    2. Essential hypertension  Blood pressure at the office today mildly elevated 144/86, patient on Coreg only.    3. Type 2 diabetes mellitus    Reviewed labs, A1c below 6, patient is on use of Ozempic when she finds it only.  On metformin and Actos.  Past medical history, family history, social  history and medications reviewed and updated in the record.  Today  Current medications, problem list and allergies reviewed in EPIC today  Health maintenance topics are reviewed and updated.    Patient Active Problem List    Diagnosis Date Noted    Pure hyperglyceridemia 11/11/2022    Joint pain 11/11/2022    Heart murmur 11/11/2022    Gastroesophageal reflux disease 11/11/2022    Female infertility 11/11/2022    Depressive disorder 11/11/2022    Vaginal atrophy 03/25/2022    Well woman exam 03/25/2022    Diabetes mellitus (HCC) 06/12/2019    Vitamin D deficiency disease 12/02/2016    Morbid obesity (HCC) 05/31/2016    S/P gastric surgery 04/18/2016    Essential hypertension 07/20/2012    Preop cardiovascular exam 07/20/2012    Nonspecific abnormal electrocardiogram (ECG) (EKG) 07/20/2012     Family History   Problem Relation Age of Onset    Hypertension Mother     Heart Attack Father     Other Father     Stroke Father     Hypertension Father     Heart Attack Paternal Grandfather      Social History     Socioeconomic History    Marital status:      Spouse name: Not on file    Number of children: Not on file    Years of education: Not on file    Highest education level: Not on file   Occupational History    Not on file   Tobacco Use    Smoking status: Never    Smokeless tobacco: Never   Vaping Use    Vaping Use: Never used   Substance and Sexual Activity    Alcohol use: No    Drug use: No    Sexual activity: Yes     Partners: Male   Other Topics Concern    Not on file   Social History Narrative    Not on file     Social Determinants of Health     Financial Resource Strain: Not on file   Food Insecurity: Not on file   Transportation Needs: Not on file   Physical Activity: Not on file   Stress: Not on file   Social Connections: Not on file   Intimate Partner Violence: Not on file   Housing Stability: Not on file       Current Outpatient Medications   Medication Sig Dispense Refill    Semaglutide, 1 MG/DOSE,  "(OZEMPIC, 1 MG/DOSE,) 4 MG/3ML Solution Pen-injector Inject 1 mg under the skin every 7 days. 3 mL 3    hydrocortisone 2.5 % Cream topical cream APPLY TOPICALLY EVERY DAY TO ARMPITS AND AFFECTED AREAS      triamcinolone acetonide (KENALOG) 0.1 % Cream       carvedilol (COREG) 6.25 MG Tab TAKE 1 TABLET BY MOUTH TWICE DAILY WITH MEALS 180 Tablet 3    pioglitazone (ACTOS) 15 MG Tab TAKE 1 TABLET BY MOUTH EVERY DAY 30 Tablet 11    meloxicam (MOBIC) 15 MG tablet Take 1 Tablet by mouth every day. (Patient not taking: Reported on 5/9/2023) 30 Tablet 0    metFORMIN (GLUCOPHAGE) 500 MG Tab TAKE 2 TABLETS(1000 MG) BY MOUTH EVERY  Tablet 3    Blood Glucose Test Strips Test strips order: Test strips for One Touch Ultra meter. Sig: use qd and prn ssx high or low sugar #100 RF x 3 100 Strip 3    estrogens, conjugated (PREMARIN) 0.625 MG/GM Cream Insert 0.5 g vaginally, once daily for 3 weeks.  Then use every other day 30 g 3    Misc. Devices Misc Nocturnal desaturation study. 1 Each 0    Blood Glucose Monitoring Suppl (ONE TOUCH ULTRA 2) w/Device Kit USE FOR BLOOD SUGAR TEST UTD  0    ONE TOUCH ULTRA TEST strip USE ONE STRIP FOR BLOOD SUGAR TEST D AND PRN  3    ONETOUCH DELICA LANCETS 33G Misc USE ONE LANCET FOR BLOOD SUGAR TEST D AND PRN  3    NON SPECIFIED Glucometer and Test Strips and related supplies as covered by insurance.  QS for 90 days check qd and prn 150 Each 3    vitamin D (CHOLECALCIFEROL) 1000 UNIT Tab Take 1 Tablet by mouth every day.       No current facility-administered medications for this visit.         Review Of Systems  As documented in HPI above  PHYSICAL EXAMINATION:    BP (!) 144/86 (BP Location: Right arm, Patient Position: Sitting, BP Cuff Size: Adult)   Pulse 84   Temp 37 °C (98.6 °F) (Temporal)   Resp 16   Ht 1.638 m (5' 4.5\")   Wt (!) 145 kg (320 lb)   LMP 03/01/2016   SpO2 95%   BMI 54.08 kg/m²   Gen.: Well-developed, well-nourished, no apparent distress, pleasant and cooperative " with the examination  HEENT: Normocephalic/atraumatic,  Neck: No JVD or bruits, no adenopathy  Cor: Regular rate and rhythm without murmur gallop or rub  Lungs: Clear to auscultation with equal breath sounds bilaterally. No wheezes, rhonchi.  Abdomen: Soft nontender without hepatosplenomegaly or masses appreciated, normoactive bowel sounds  Extremities: No cyanosis, clubbing or edema       ASSESSMENT/Plan:  1. Morbid obesity (HCC)  New first initial visit for weight management plan as follows:    Diet: After calculating patient BMR which is around 2000 calories per day advised to cut back on her calories to around 1800 olga lidia and the goal was to go to 1500 olga lidia/day only.      Protein around 60 to 70 g/day patient to go up to 120 g of protein daily.  Discussed with the patient as follows:  Diet mainly consistent of high-protein, low carb, low fiber, low fat.  Patient to take 3 meals and 1 snack per day and she can increase it to 2 snacks if she is feeling hungry.  Mainly the breakfast and lunch would be meal substitute, patient preferred to use protein bar and protein shake, discussed with the patient the criteria to choose her protein shakes or bars for calorie protein ratio as 10:1.  Fluids intake will advise water around 80 ounces per day  Avoid juices and sodas, allowed for diet soda occasionally, no artificial sweeteners  Exercise   4-5 times a week 30 minutes of walk will increase gradually, no barriers for exercise activity     Medication: Discussed with patient to stop Actos after discussing it with her PCP to prevent water retention and weight gain.  restart patient on Ozempic will increase the dose to on 1 mg  Semaglutide, 1 MG/DOSE, (OZEMPIC, 1 MG/DOSE,) 4 MG/3ML Solution Pen-injector    Regular follow-up recommended for weight check.  Next follow-up in 2 weeks      2. Essential hypertension  Chronic not well-controlled, will suggest adding an ARB to control blood pressure will recheck next visit, follow-up  with PCP      3. Type 2 diabetes mellitus without complication, without long-term current use of insulin (HCC)  Chronic, controlled.  Continue with metformin for now, restarted patient on Ozempic.  Will increase the dose and I suggest to stop Actos.         Please note that this dictation was created using voice recognition software. I have worked with consultants from the vendor as well as technical experts from [facility] to optimize the interface. I have made every reasonable attempt to correct obvious errors, but I expect that there are errors of grammar and possibly content that I did not discover before finalizing the note.

## 2023-08-31 ENCOUNTER — OFFICE VISIT (OUTPATIENT)
Dept: MEDICAL GROUP | Facility: LAB | Age: 53
End: 2023-08-31
Payer: COMMERCIAL

## 2023-08-31 ENCOUNTER — APPOINTMENT (RX ONLY)
Dept: URBAN - METROPOLITAN AREA CLINIC 22 | Facility: CLINIC | Age: 53
Setting detail: DERMATOLOGY
End: 2023-08-31

## 2023-08-31 VITALS
RESPIRATION RATE: 18 BRPM | HEIGHT: 65 IN | HEART RATE: 80 BPM | BODY MASS INDEX: 48.82 KG/M2 | TEMPERATURE: 98 F | OXYGEN SATURATION: 98 % | WEIGHT: 293 LBS | SYSTOLIC BLOOD PRESSURE: 138 MMHG | DIASTOLIC BLOOD PRESSURE: 88 MMHG

## 2023-08-31 DIAGNOSIS — E11.9 TYPE 2 DIABETES MELLITUS WITHOUT COMPLICATION, WITHOUT LONG-TERM CURRENT USE OF INSULIN (HCC): ICD-10-CM

## 2023-08-31 DIAGNOSIS — L73.8 OTHER SPECIFIED FOLLICULAR DISORDERS: ICD-10-CM

## 2023-08-31 DIAGNOSIS — I10 ESSENTIAL HYPERTENSION: ICD-10-CM

## 2023-08-31 DIAGNOSIS — Z85.828 PERSONAL HISTORY OF OTHER MALIGNANT NEOPLASM OF SKIN: ICD-10-CM

## 2023-08-31 DIAGNOSIS — L81.4 OTHER MELANIN HYPERPIGMENTATION: ICD-10-CM

## 2023-08-31 DIAGNOSIS — Z71.89 OTHER SPECIFIED COUNSELING: ICD-10-CM

## 2023-08-31 DIAGNOSIS — L82.0 INFLAMED SEBORRHEIC KERATOSIS: ICD-10-CM

## 2023-08-31 DIAGNOSIS — E66.01 MORBID OBESITY (HCC): ICD-10-CM

## 2023-08-31 PROCEDURE — ? ADDITIONAL NOTES

## 2023-08-31 PROCEDURE — 99214 OFFICE O/P EST MOD 30 MIN: CPT | Performed by: FAMILY MEDICINE

## 2023-08-31 PROCEDURE — ? SUNSCREEN RECOMMENDATIONS

## 2023-08-31 PROCEDURE — 17110 DESTRUCTION B9 LES UP TO 14: CPT

## 2023-08-31 PROCEDURE — 3075F SYST BP GE 130 - 139MM HG: CPT | Performed by: FAMILY MEDICINE

## 2023-08-31 PROCEDURE — ? DIAGNOSIS COMMENT

## 2023-08-31 PROCEDURE — ? COUNSELING

## 2023-08-31 PROCEDURE — 99213 OFFICE O/P EST LOW 20 MIN: CPT | Mod: 25

## 2023-08-31 PROCEDURE — 3079F DIAST BP 80-89 MM HG: CPT | Performed by: FAMILY MEDICINE

## 2023-08-31 PROCEDURE — ? LIQUID NITROGEN

## 2023-08-31 RX ORDER — PIOGLITAZONEHYDROCHLORIDE 30 MG/1
1 TABLET ORAL
COMMUNITY
End: 2023-08-31

## 2023-08-31 RX ORDER — CARVEDILOL 6.25 MG/1
1 TABLET ORAL 2 TIMES DAILY WITH MEALS
COMMUNITY
End: 2023-08-31

## 2023-08-31 RX ORDER — LOSARTAN POTASSIUM 25 MG/1
25 TABLET ORAL DAILY
Qty: 90 TABLET | Refills: 3 | Status: SHIPPED | OUTPATIENT
Start: 2023-08-31 | End: 2023-09-15

## 2023-08-31 RX ORDER — SEMAGLUTIDE 1.34 MG/ML
INJECTION, SOLUTION SUBCUTANEOUS
COMMUNITY
End: 2023-08-31

## 2023-08-31 RX ORDER — PIOGLITAZONEHYDROCHLORIDE 15 MG/1
1 TABLET ORAL
COMMUNITY
End: 2023-08-31

## 2023-08-31 RX ORDER — DOXYCYCLINE HYCLATE 100 MG/1
CAPSULE ORAL
COMMUNITY
Start: 2023-06-15 | End: 2023-08-31

## 2023-08-31 RX ORDER — FLUTICASONE PROPIONATE 50 MCG
SPRAY, SUSPENSION (ML) NASAL
COMMUNITY
Start: 2023-06-15

## 2023-08-31 RX ORDER — AZELASTINE 1 MG/ML
2 SPRAY, METERED NASAL 2 TIMES DAILY
COMMUNITY
Start: 2023-06-15

## 2023-08-31 ASSESSMENT — LOCATION DETAILED DESCRIPTION DERM
LOCATION DETAILED: LEFT RIB CAGE
LOCATION DETAILED: NASAL ROOT
LOCATION DETAILED: RIGHT CHIN
LOCATION DETAILED: RIGHT SUPERIOR CENTRAL MALAR CHEEK
LOCATION DETAILED: RIGHT INFERIOR LATERAL NECK
LOCATION DETAILED: LEFT CLAVICULAR NECK
LOCATION DETAILED: LEFT CHIN
LOCATION DETAILED: LEFT MEDIAL SUPERIOR CHEST
LOCATION DETAILED: RIGHT DISTAL LATERAL POSTERIOR UPPER ARM
LOCATION DETAILED: LEFT DISTAL POSTERIOR UPPER ARM
LOCATION DETAILED: LEFT INFERIOR LATERAL NECK
LOCATION DETAILED: LEFT LATERAL SUPERIOR CHEST
LOCATION DETAILED: LEFT PROXIMAL DORSAL FOREARM
LOCATION DETAILED: LEFT DISTAL LATERAL POSTERIOR UPPER ARM
LOCATION DETAILED: RIGHT PROXIMAL DORSAL FOREARM
LOCATION DETAILED: LEFT MEDIAL FOREHEAD
LOCATION DETAILED: RIGHT INFERIOR ANTERIOR NECK
LOCATION DETAILED: RIGHT CLAVICULAR NECK

## 2023-08-31 ASSESSMENT — FIBROSIS 4 INDEX: FIB4 SCORE: 1.23

## 2023-08-31 ASSESSMENT — LOCATION SIMPLE DESCRIPTION DERM
LOCATION SIMPLE: CHIN
LOCATION SIMPLE: ABDOMEN
LOCATION SIMPLE: LEFT FOREHEAD
LOCATION SIMPLE: RIGHT ANTERIOR NECK
LOCATION SIMPLE: LEFT ANTERIOR NECK
LOCATION SIMPLE: LEFT FOREARM
LOCATION SIMPLE: RIGHT CHEEK
LOCATION SIMPLE: RIGHT FOREARM
LOCATION SIMPLE: CHEST
LOCATION SIMPLE: LEFT POSTERIOR UPPER ARM
LOCATION SIMPLE: RIGHT POSTERIOR UPPER ARM
LOCATION SIMPLE: NOSE

## 2023-08-31 ASSESSMENT — LOCATION ZONE DERM
LOCATION ZONE: NECK
LOCATION ZONE: FACE
LOCATION ZONE: ARM
LOCATION ZONE: TRUNK
LOCATION ZONE: NOSE

## 2023-08-31 NOTE — PROGRESS NOTES
Chief Complaint:   Chief Complaint   Patient presents with    Weight Check       HPI: Established patient  Logan Mahoney is a 53 y.o. female who presents for follow-up on weight check.  Discussed the following today:    1. Morbid obesity   Chronic, second visit after initial visit,  Lost about 4 pounds since last visit, congratulated on her weight loss patient on high-protein diet, calorie deficiency around 1800, exercise did not start yet, patient said she is trying and eventually she will start having an exercise regimen.  Drinking large amount of water around a gallon per day.    2. Type 2 diabetes mellitus without complication, without long-term current use of insulin   Patient is still taking Actos, and metformin was restarted Ozempic at 1 mg denies GI side effects    3. Essential hypertension    Blood pressure at the office today 138/88, patient said she was started on Coreg long time ago to control her blood pressure.  Reports a lot of tiredness and fatigue for a long time        Past medical history, family history, social history and medications reviewed and updated in the record.   Current medications, problem list and allergies reviewed in EPIC today  Health maintenance topics are reviewed and updated.  Today    Patient Active Problem List    Diagnosis Date Noted    Pure hyperglyceridemia 11/11/2022    Joint pain 11/11/2022    Heart murmur 11/11/2022    Gastroesophageal reflux disease 11/11/2022    Female infertility 11/11/2022    Depressive disorder 11/11/2022    Vaginal atrophy 03/25/2022    Well woman exam 03/25/2022    Diabetes mellitus (HCC) 06/12/2019    Vitamin D deficiency disease 12/02/2016    Morbid obesity (HCC) 05/31/2016    S/P gastric surgery 04/18/2016    Essential hypertension 07/20/2012    Preop cardiovascular exam 07/20/2012    Nonspecific abnormal electrocardiogram (ECG) (EKG) 07/20/2012     Family History   Problem Relation Age of Onset    Hypertension Mother     Heart Attack  Father     Other Father     Stroke Father     Hypertension Father     Heart Attack Paternal Grandfather      Social History     Socioeconomic History    Marital status:      Spouse name: Not on file    Number of children: Not on file    Years of education: Not on file    Highest education level: Not on file   Occupational History    Not on file   Tobacco Use    Smoking status: Never    Smokeless tobacco: Never   Vaping Use    Vaping Use: Never used   Substance and Sexual Activity    Alcohol use: No    Drug use: No    Sexual activity: Yes     Partners: Male   Other Topics Concern    Not on file   Social History Narrative    Not on file     Social Determinants of Health     Financial Resource Strain: Not on file   Food Insecurity: Not on file   Transportation Needs: Not on file   Physical Activity: Not on file   Stress: Not on file   Social Connections: Not on file   Intimate Partner Violence: Not on file   Housing Stability: Not on file       Current Outpatient Medications   Medication Sig Dispense Refill    losartan (COZAAR) 25 MG Tab Take 1 Tablet by mouth every day. 90 Tablet 3    Semaglutide, 2 MG/DOSE, 8 MG/3ML Solution Pen-injector Inject 2 mg under the skin every 7 days. 3 mL 3    azelastine (ASTELIN) 137 MCG/SPRAY nasal spray 2 Sprays 2 times a day.      fluticasone (FLONASE) 50 MCG/ACT nasal spray ADMINISTER 1 SPRAY IN EACH NOSTRIL ONCE A DAY      metFORMIN (GLUCOPHAGE) 500 MG Tab Take 1 Tablet by mouth 2 times a day with meals.      hydrocortisone 2.5 % Cream topical cream APPLY TOPICALLY EVERY DAY TO ARMPITS AND AFFECTED AREAS      triamcinolone acetonide (KENALOG) 0.1 % Cream       carvedilol (COREG) 6.25 MG Tab TAKE 1 TABLET BY MOUTH TWICE DAILY WITH MEALS 180 Tablet 3    meloxicam (MOBIC) 15 MG tablet Take 1 Tablet by mouth every day. 30 Tablet 0    metFORMIN (GLUCOPHAGE) 500 MG Tab TAKE 2 TABLETS(1000 MG) BY MOUTH EVERY  Tablet 3    Blood Glucose Test Strips Test strips order: Test strips  "for One Touch Ultra meter. Sig: use qd and prn ssx high or low sugar #100 RF x 3 100 Strip 3    estrogens, conjugated (PREMARIN) 0.625 MG/GM Cream Insert 0.5 g vaginally, once daily for 3 weeks.  Then use every other day 30 g 3    Misc. Devices Misc Nocturnal desaturation study. 1 Each 0    Blood Glucose Monitoring Suppl (ONE TOUCH ULTRA 2) w/Device Kit USE FOR BLOOD SUGAR TEST UTD  0    ONE TOUCH ULTRA TEST strip USE ONE STRIP FOR BLOOD SUGAR TEST D AND PRN  3    ONETOUCH DELICA LANCETS 33G Misc USE ONE LANCET FOR BLOOD SUGAR TEST D AND PRN  3    NON SPECIFIED Glucometer and Test Strips and related supplies as covered by insurance.  QS for 90 days check qd and prn 150 Each 3    vitamin D (CHOLECALCIFEROL) 1000 UNIT Tab Take 1 Tablet by mouth every day.       No current facility-administered medications for this visit.         Review Of Systems  As documented in HPI above  PHYSICAL EXAMINATION:    /88 (BP Location: Right arm, Patient Position: Sitting, BP Cuff Size: Adult)   Pulse 80   Temp 36.7 °C (98 °F) (Temporal)   Resp 18   Ht 1.638 m (5' 4.5\")   Wt (!) 143 kg (316 lb)   LMP 03/01/2016   SpO2 98%   BMI 53.40 kg/m²   Gen.: Well-developed, well-nourished, no apparent distress, pleasant and cooperative with the examination  HEENT: Normocephalic/atraumatic,Neck: No JVD or bruits, no adenopathy  Cor: Regular rate and rhythm without murmur gallop or rub  Lungs: Clear to auscultation with equal breath sounds bilaterally. No wheezes, rhonchi.  Abdomen: Soft nontender without hepatosplenomegaly or masses appreciated, normoactive bowel sounds  Extremities: No cyanosis, clubbing or edema       ASSESSMENT/Plan:  1. Morbid obesity (HCC)  Chronic, congratulated on 4 pounds of weight loss, stop Actos, increase dose of semaglutide to 2 mg weekly, continue with high-protein diet, start exercising.  A minimum of 150 minutes/week, discussed importance of exercise to increase metabolism and fat burning.  Continue " with excessive hydration.  Follow-up in 2 weeks for weight check  Semaglutide, 2 MG/DOSE, 8 MG/3ML Solution Pen-injector      2. Type 2 diabetes mellitus without complication, without long-term current use of insulin (HCC)  Continue with weight management program and dietary restrictions, increase Ozempic to 2 mg to help with weight loss and better sugar control, patient denies side effects, advised if any nausea or vomiting or GI upset patient to hold off on metformin, stop Actos since it causes weight gain  Semaglutide, 2 MG/DOSE, 8 MG/3ML Solution Pen-injector      3. Essential hypertension  Chronic, fair control since patient with history of diabetes I prefer her to be on an ARB, will start her on losartan, taper down until off on Coreg gradual.    losartan (COZAAR) 25 MG Tab         Please note that this dictation was created using voice recognition software. I have made every reasonable attempt to correct obvious errors but there may be errors of grammar and content that I may have overlooked prior to finalization of this note.

## 2023-08-31 NOTE — PROCEDURE: LIQUID NITROGEN
Medical Necessity Clause: This procedure was medically necessary because the lesions that were treated were:
Show Spray Paint Technique Variable?: Yes
Spray Paint Technique: No
Post-Care Instructions: I reviewed with the patient in detail post-care instructions. Patient is to wear sunprotection, and avoid picking at any of the treated lesions. Pt may apply Vaseline to crusted or scabbing areas.
Number Of Freeze-Thaw Cycles: 3 freeze-thaw cycles
Application Tool (Optional): Liquid Nitrogen Sprayer
Consent: The patient's consent was obtained including but not limited to risks of crusting, scabbing, blistering, scarring, darker or lighter pigmentary change, recurrence, incomplete removal and infection.
Spray Paint Text: The liquid nitrogen was applied to the skin utilizing a spray paint frosting technique.
Medical Necessity Information: It is in your best interest to select a reason for this procedure from the list below. All of these items fulfill various CMS LCD requirements except the new and changing color options.
Duration Of Freeze Thaw-Cycle (Seconds): 3
Detail Level: Detailed

## 2023-08-31 NOTE — PROCEDURE: ADDITIONAL NOTES
Detail Level: Simple
Additional Notes: Recommended further treatment with Vivian.
Render Risk Assessment In Note?: no

## 2023-08-31 NOTE — PROCEDURE: MIPS QUALITY
Quality 226: Preventive Care And Screening: Tobacco Use: Screening And Cessation Intervention: Patient screened for tobacco use and is an ex/non-smoker
Quality 130: Documentation Of Current Medications In The Medical Record: Current Medications Documented
Quality 110: Preventive Care And Screening: Influenza Immunization: Influenza Immunization not Administered due to Patient Allergy.
Detail Level: Detailed
Quality 431: Preventive Care And Screening: Unhealthy Alcohol Use - Screening: Patient not identified as an unhealthy alcohol user when screened for unhealthy alcohol use using a systematic screening method

## 2023-09-09 ENCOUNTER — HOSPITAL ENCOUNTER (OUTPATIENT)
Dept: LAB | Facility: MEDICAL CENTER | Age: 53
End: 2023-09-09
Attending: INTERNAL MEDICINE
Payer: COMMERCIAL

## 2023-09-09 DIAGNOSIS — E55.9 VITAMIN D DEFICIENCY: ICD-10-CM

## 2023-09-09 DIAGNOSIS — E11.9 TYPE 2 DIABETES MELLITUS WITHOUT COMPLICATION, WITHOUT LONG-TERM CURRENT USE OF INSULIN (HCC): ICD-10-CM

## 2023-09-09 LAB
25(OH)D3 SERPL-MCNC: 33 NG/ML (ref 30–100)
EST. AVERAGE GLUCOSE BLD GHB EST-MCNC: 160 MG/DL
HBA1C MFR BLD: 7.2 % (ref 4–5.6)

## 2023-09-09 PROCEDURE — 82306 VITAMIN D 25 HYDROXY: CPT

## 2023-09-09 PROCEDURE — 36415 COLL VENOUS BLD VENIPUNCTURE: CPT

## 2023-09-09 PROCEDURE — 83036 HEMOGLOBIN GLYCOSYLATED A1C: CPT

## 2023-09-14 ENCOUNTER — TELEPHONE (OUTPATIENT)
Dept: MEDICAL GROUP | Facility: LAB | Age: 53
End: 2023-09-14
Payer: COMMERCIAL

## 2023-09-15 ENCOUNTER — APPOINTMENT (OUTPATIENT)
Dept: MEDICAL GROUP | Facility: LAB | Age: 53
End: 2023-09-15
Payer: COMMERCIAL

## 2023-09-15 ENCOUNTER — OFFICE VISIT (OUTPATIENT)
Dept: MEDICAL GROUP | Facility: LAB | Age: 53
End: 2023-09-15
Payer: COMMERCIAL

## 2023-09-15 VITALS
DIASTOLIC BLOOD PRESSURE: 96 MMHG | BODY MASS INDEX: 48.82 KG/M2 | SYSTOLIC BLOOD PRESSURE: 172 MMHG | HEIGHT: 65 IN | TEMPERATURE: 98.3 F | HEART RATE: 68 BPM | OXYGEN SATURATION: 96 % | WEIGHT: 293 LBS | RESPIRATION RATE: 16 BRPM

## 2023-09-15 DIAGNOSIS — R10.12 LUQ ABDOMINAL PAIN: ICD-10-CM

## 2023-09-15 DIAGNOSIS — I15.9 SECONDARY HYPERTENSION: ICD-10-CM

## 2023-09-15 PROCEDURE — 3080F DIAST BP >= 90 MM HG: CPT | Performed by: INTERNAL MEDICINE

## 2023-09-15 PROCEDURE — 99213 OFFICE O/P EST LOW 20 MIN: CPT | Performed by: INTERNAL MEDICINE

## 2023-09-15 PROCEDURE — 3077F SYST BP >= 140 MM HG: CPT | Performed by: INTERNAL MEDICINE

## 2023-09-15 RX ORDER — LOSARTAN POTASSIUM 50 MG/1
50 TABLET ORAL DAILY
Qty: 90 TABLET | Refills: 3 | Status: SHIPPED | OUTPATIENT
Start: 2023-09-15

## 2023-09-15 ASSESSMENT — FIBROSIS 4 INDEX: FIB4 SCORE: 1.23

## 2023-09-15 NOTE — PROGRESS NOTES
CC: Logan Mahoney is a 53 y.o. female is suffering from   Chief Complaint   Patient presents with    LUQ Pain     LUQ pain worsening with use of Ozempic         SUBJECTIVE:  1. LUQ abdominal pain  Pamela is here for follow-up is having problems with left upper quadrant abdominal pain discomfort states this is at the site of her previous Lap-Band surgery where they had an inflatable band.  This has been removed.    2. Secondary hypertension  Tension exact etiology uncertain        Past social, family, history: ,   Social History     Tobacco Use    Smoking status: Never    Smokeless tobacco: Never   Vaping Use    Vaping Use: Never used   Substance Use Topics    Alcohol use: No    Drug use: No         MEDICATIONS:    Current Outpatient Medications:     losartan (COZAAR) 50 MG Tab, Take 1 Tablet by mouth every day., Disp: 90 Tablet, Rfl: 3    azelastine (ASTELIN) 137 MCG/SPRAY nasal spray, 2 Sprays 2 times a day., Disp: , Rfl:     fluticasone (FLONASE) 50 MCG/ACT nasal spray, ADMINISTER 1 SPRAY IN EACH NOSTRIL ONCE A DAY, Disp: , Rfl:     Semaglutide, 2 MG/DOSE, 8 MG/3ML Solution Pen-injector, Inject 2 mg under the skin every 7 days., Disp: 3 mL, Rfl: 3    hydrocortisone 2.5 % Cream topical cream, APPLY TOPICALLY EVERY DAY TO ARMPITS AND AFFECTED AREAS, Disp: , Rfl:     triamcinolone acetonide (KENALOG) 0.1 % Cream, , Disp: , Rfl:     meloxicam (MOBIC) 15 MG tablet, Take 1 Tablet by mouth every day., Disp: 30 Tablet, Rfl: 0    estrogens, conjugated (PREMARIN) 0.625 MG/GM Cream, Insert 0.5 g vaginally, once daily for 3 weeks.  Then use every other day, Disp: 30 g, Rfl: 3    vitamin D (CHOLECALCIFEROL) 1000 UNIT Tab, Take 1 Tablet by mouth every day., Disp: , Rfl:     metFORMIN (GLUCOPHAGE) 500 MG Tab, Take 1 Tablet by mouth 2 times a day with meals. (Patient not taking: Reported on 9/15/2023), Disp: , Rfl:     carvedilol (COREG) 6.25 MG Tab, TAKE 1 TABLET BY MOUTH TWICE DAILY  WITH MEALS (Patient not taking: Reported on 9/15/2023), Disp: 180 Tablet, Rfl: 3    metFORMIN (GLUCOPHAGE) 500 MG Tab, TAKE 2 TABLETS(1000 MG) BY MOUTH EVERY DAY (Patient not taking: Reported on 9/15/2023), Disp: 180 Tablet, Rfl: 3    Blood Glucose Test Strips, Test strips order: Test strips for One Touch Ultra meter. Sig: use qd and prn ssx high or low sugar #100 RF x 3, Disp: 100 Strip, Rfl: 3    Misc. Devices Misc, Nocturnal desaturation study., Disp: 1 Each, Rfl: 0    Blood Glucose Monitoring Suppl (ONE TOUCH ULTRA 2) w/Device Kit, USE FOR BLOOD SUGAR TEST UTD, Disp: , Rfl: 0    ONE TOUCH ULTRA TEST strip, USE ONE STRIP FOR BLOOD SUGAR TEST D AND PRN, Disp: , Rfl: 3    ONETOUCH DELICA LANCETS 33G Misc, USE ONE LANCET FOR BLOOD SUGAR TEST D AND PRN, Disp: , Rfl: 3    NON SPECIFIED, Glucometer and Test Strips and related supplies as covered by insurance.  QS for 90 days check qd and prn, Disp: 150 Each, Rfl: 3    PROBLEMS:  Patient Active Problem List    Diagnosis Date Noted    Pure hyperglyceridemia 11/11/2022    Joint pain 11/11/2022    Heart murmur 11/11/2022    Gastroesophageal reflux disease 11/11/2022    Female infertility 11/11/2022    Depressive disorder 11/11/2022    Vaginal atrophy 03/25/2022    Well woman exam 03/25/2022    Diabetes mellitus (HCC) 06/12/2019    Vitamin D deficiency disease 12/02/2016    Morbid obesity (HCC) 05/31/2016    S/P gastric surgery 04/18/2016    Essential hypertension 07/20/2012    Preop cardiovascular exam 07/20/2012    Nonspecific abnormal electrocardiogram (ECG) (EKG) 07/20/2012       REVIEW OF SYSTEMS:  Gen.:  No Nausea, Vomiting, fever, Chills.  Heart: No chest pain.  Lungs:  No shortness of Breath.  Psychological: Rinku unusual Anxiety depression     PHYSICAL EXAM   Constitutional: Alert, cooperative, not in acute distress.  Cardiovascular:  Rate Rhythm is regular without murmurs rubs clicks.     Thorax & Lungs: Clear to auscultation, no wheezing, rhonchi, or rales  HENT:  "Normocephalic, Atraumatic.  Eyes: PERRLA, EOMI, Conjunctiva normal.   Neck: Trachia is midline no swelling of the thyroid.   Lymphatic: No lymphadenopathy noted.   Abdomin: Left upper quadrant pain to palpation, no rebound tenderness or guarding.   Skin: Warm, Dry, No erythema, No rash.   Neurologic: Alert & oriented x 3, cranial nerves II through XII are intact, Normal motor function, Normal sensory function, No focal deficits noted.   Psychiatric: Affect normal, Judgment normal, Mood normal.     VITAL SIGNS:BP (!) 172/96   Pulse 68   Temp 36.8 °C (98.3 °F) (Temporal)   Resp 16   Ht 1.638 m (5' 4.5\")   Wt (!) 143 kg (315 lb)   LMP 03/01/2016   SpO2 96%   BMI 53.23 kg/m²     Labs: Reviewed    Assessment:                                                     Plan:    1. LUQ abdominal pain  Ultrasound left upper quadrant ordered, emergency room precautions given to the patient  - US-ABDOMEN LTD (SOFT TISSUE); Future    2. Secondary hypertension  Increase losartan from 25 to 50 mg  - losartan (COZAAR) 50 MG Tab; Take 1 Tablet by mouth every day.  Dispense: 90 Tablet; Refill: 3        "

## 2023-09-18 ENCOUNTER — HOSPITAL ENCOUNTER (OUTPATIENT)
Dept: RADIOLOGY | Facility: MEDICAL CENTER | Age: 53
End: 2023-09-18
Attending: INTERNAL MEDICINE
Payer: COMMERCIAL

## 2023-09-18 ENCOUNTER — PATIENT MESSAGE (OUTPATIENT)
Dept: MEDICAL GROUP | Facility: LAB | Age: 53
End: 2023-09-18
Payer: COMMERCIAL

## 2023-09-18 DIAGNOSIS — R10.12 LUQ ABDOMINAL PAIN: ICD-10-CM

## 2023-09-18 PROCEDURE — 76705 ECHO EXAM OF ABDOMEN: CPT

## 2023-09-26 ENCOUNTER — APPOINTMENT (OUTPATIENT)
Dept: MEDICAL GROUP | Facility: LAB | Age: 53
End: 2023-09-26
Payer: COMMERCIAL

## 2023-09-27 ENCOUNTER — APPOINTMENT (OUTPATIENT)
Dept: MEDICAL GROUP | Facility: LAB | Age: 53
End: 2023-09-27
Payer: COMMERCIAL

## 2023-10-05 NOTE — TELEPHONE ENCOUNTER
Received request via: Pharmacy    Was the patient seen in the last year in this department? Yes  LOV 09/15/2023  Does the patient have an active prescription (recently filled or refills available) for medication(s) requested? No    Does the patient have [facility] and need 100 day supply (blood pressure, diabetes and cholesterol meds only)? Patient does not have SCP

## 2023-10-09 ENCOUNTER — APPOINTMENT (OUTPATIENT)
Dept: MEDICAL GROUP | Facility: LAB | Age: 53
End: 2023-10-09
Payer: COMMERCIAL

## 2023-10-18 ENCOUNTER — PATIENT MESSAGE (OUTPATIENT)
Dept: MEDICAL GROUP | Facility: LAB | Age: 53
End: 2023-10-18
Payer: COMMERCIAL

## 2023-10-19 ENCOUNTER — APPOINTMENT (OUTPATIENT)
Dept: MEDICAL GROUP | Facility: LAB | Age: 53
End: 2023-10-19
Payer: COMMERCIAL

## 2023-10-20 ENCOUNTER — TELEPHONE (OUTPATIENT)
Dept: MEDICAL GROUP | Facility: LAB | Age: 53
End: 2023-10-20
Payer: COMMERCIAL

## 2023-10-20 NOTE — TELEPHONE ENCOUNTER
Gordo message reviewed, patient has a lot of anxiety about traveling to see the wedding of her niece, clearly is having very serious problems, letter dictated about her anxiety issues.   The cause of your symptoms isn't clear. I would recommend that you use benadryl every 6 hours for the next day, then as needed after that with any concerning symptoms. Use the famotidine twice daily for 5 days. Use the epipen for any severe symptoms (feeling of swelling in mouth/throat, difficulty breathing, lightheadedness) - if you do need to use, you need to go to an ER for further evaluation/monitoring. Return with any concerns. Follow up with your clinic doctor this week.

## 2023-10-28 ENCOUNTER — PATIENT MESSAGE (OUTPATIENT)
Dept: MEDICAL GROUP | Facility: LAB | Age: 53
End: 2023-10-28
Payer: COMMERCIAL

## 2023-10-31 ENCOUNTER — TELEPHONE (OUTPATIENT)
Dept: MEDICAL GROUP | Facility: LAB | Age: 53
End: 2023-10-31
Payer: COMMERCIAL

## 2023-10-31 DIAGNOSIS — E11.9 TYPE 2 DIABETES MELLITUS WITHOUT COMPLICATION, WITHOUT LONG-TERM CURRENT USE OF INSULIN (HCC): ICD-10-CM

## 2023-10-31 DIAGNOSIS — E66.01 MORBID OBESITY (HCC): ICD-10-CM

## 2023-11-02 ENCOUNTER — PATIENT MESSAGE (OUTPATIENT)
Dept: MEDICAL GROUP | Facility: LAB | Age: 53
End: 2023-11-02
Payer: COMMERCIAL

## 2023-11-02 DIAGNOSIS — E11.9 TYPE 2 DIABETES MELLITUS WITHOUT COMPLICATION, WITHOUT LONG-TERM CURRENT USE OF INSULIN (HCC): ICD-10-CM

## 2023-11-02 DIAGNOSIS — E66.01 MORBID OBESITY (HCC): ICD-10-CM

## 2023-11-02 NOTE — TELEPHONE ENCOUNTER
1. Caller Name: Logan                         Call Back Number: [phone]      How would the patient prefer to be contacted with a response: Phone call do NOT leave a detailed message    Pt is not able to get Ozempic.  She is asking for a different medication for wt loss.    
Gaye:  New medication (Trulicity) has been sent to the patient's pharmacy.   Regards, Manuel Barbosa DO    
How Severe Is Your Eczema?: moderate
L/m notifying pt that new Rx called in.  
Is This A New Presentation, Or A Follow-Up?: Rash

## 2023-11-02 NOTE — TELEPHONE ENCOUNTER
Received request via: Pharmacy    Was the patient seen in the last year in this department? Yes  9/15/23  Does the patient have an active prescription (recently filled or refills available) for medication(s) requested? No    Does the [facility] have [facility] and need 100 day supply (blood pressure, diabetes and cholesterol meds only)? Medication is not for cholesterol, blood pressure or diabetes

## 2023-11-16 ENCOUNTER — APPOINTMENT (OUTPATIENT)
Dept: MEDICAL GROUP | Facility: LAB | Age: 53
End: 2023-11-16
Payer: COMMERCIAL

## 2023-12-06 ENCOUNTER — OFFICE VISIT (OUTPATIENT)
Dept: MEDICAL GROUP | Facility: LAB | Age: 53
End: 2023-12-06
Payer: COMMERCIAL

## 2023-12-06 VITALS
OXYGEN SATURATION: 97 % | SYSTOLIC BLOOD PRESSURE: 128 MMHG | DIASTOLIC BLOOD PRESSURE: 80 MMHG | TEMPERATURE: 97.8 F | RESPIRATION RATE: 16 BRPM | BODY MASS INDEX: 50.02 KG/M2 | HEART RATE: 84 BPM | WEIGHT: 293 LBS | HEIGHT: 64 IN

## 2023-12-06 DIAGNOSIS — Z12.11 SCREEN FOR COLON CANCER: ICD-10-CM

## 2023-12-06 DIAGNOSIS — E78.5 DYSLIPIDEMIA: ICD-10-CM

## 2023-12-06 DIAGNOSIS — Z11.59 NEED FOR HEPATITIS C SCREENING TEST: ICD-10-CM

## 2023-12-06 DIAGNOSIS — E66.01 MORBID OBESITY (HCC): ICD-10-CM

## 2023-12-06 DIAGNOSIS — E11.9 TYPE 2 DIABETES MELLITUS WITHOUT COMPLICATION, WITHOUT LONG-TERM CURRENT USE OF INSULIN (HCC): ICD-10-CM

## 2023-12-06 PROCEDURE — 99214 OFFICE O/P EST MOD 30 MIN: CPT | Performed by: INTERNAL MEDICINE

## 2023-12-06 PROCEDURE — 3074F SYST BP LT 130 MM HG: CPT | Performed by: INTERNAL MEDICINE

## 2023-12-06 PROCEDURE — 3079F DIAST BP 80-89 MM HG: CPT | Performed by: INTERNAL MEDICINE

## 2023-12-06 ASSESSMENT — FIBROSIS 4 INDEX: FIB4 SCORE: 1.23

## 2023-12-06 NOTE — PROGRESS NOTES
CC: Logan Mahoney is a 53 y.o. female is suffering from   Chief Complaint   Patient presents with    Follow-Up     Go over medications         SUBJECTIVE:  1. Type 2 diabetes mellitus without complication, without long-term current use of insulin (HCC)  History of type 2 diabetes clinically well-controlled    2. Morbid obesity (HCC)  Morbid obesity continue Trulicity increase dose from 0.75 to 1.5 mg weekly    3. Need for hepatitis C screening test  Check hepatitis C    4. Dyslipidemia  Recheck lipid profile    5. Screen for colon cancer  Referral written for screening colonoscopy        Past social, family, history: , elementary school to  Social History     Tobacco Use    Smoking status: Never    Smokeless tobacco: Never   Vaping Use    Vaping Use: Never used   Substance Use Topics    Alcohol use: No    Drug use: No         MEDICATIONS:    Current Outpatient Medications:     Dulaglutide 0.75 MG/0.5ML Solution Pen-injector, Inject 1 mL under the skin every 7 days., Disp: 1.96 mL, Rfl: 5    metFORMIN (GLUCOPHAGE) 500 MG Tab, TAKE 2 TABLETS(1000 MG) BY MOUTH EVERY DAY, Disp: 180 Tablet, Rfl: 3    losartan (COZAAR) 50 MG Tab, Take 1 Tablet by mouth every day., Disp: 90 Tablet, Rfl: 3    Blood Glucose Test Strips, Test strips order: Test strips for One Touch Ultra meter. Sig: use qd and prn ssx high or low sugar #100 RF x 3, Disp: 100 Strip, Rfl: 3    Misc. Devices Misc, Nocturnal desaturation study., Disp: 1 Each, Rfl: 0    Blood Glucose Monitoring Suppl (ONE TOUCH ULTRA 2) w/Device Kit, USE FOR BLOOD SUGAR TEST UTD, Disp: , Rfl: 0    ONE TOUCH ULTRA TEST strip, USE ONE STRIP FOR BLOOD SUGAR TEST D AND PRN, Disp: , Rfl: 3    ONETOUCH DELICA LANCETS 33G Misc, USE ONE LANCET FOR BLOOD SUGAR TEST D AND PRN, Disp: , Rfl: 3    NON SPECIFIED, Glucometer and Test Strips and related supplies as covered by insurance.  QS for 90 days check qd and prn, Disp: 150 Each, Rfl: 3    vitamin D (CHOLECALCIFEROL) 1000  UNIT Tab, Take 1 Tablet by mouth every day., Disp: , Rfl:     metFORMIN (GLUCOPHAGE) 500 MG Tab, TAKE 1 TABLET BY MOUTH TWICE DAILY WITH MEALS (Patient not taking: Reported on 12/6/2023), Disp: 180 Tablet, Rfl: 3    azelastine (ASTELIN) 137 MCG/SPRAY nasal spray, 2 Sprays 2 times a day. (Patient not taking: Reported on 12/6/2023), Disp: , Rfl:     fluticasone (FLONASE) 50 MCG/ACT nasal spray, ADMINISTER 1 SPRAY IN EACH NOSTRIL ONCE A DAY (Patient not taking: Reported on 12/6/2023), Disp: , Rfl:     hydrocortisone 2.5 % Cream topical cream, APPLY TOPICALLY EVERY DAY TO ARMPITS AND AFFECTED AREAS, Disp: , Rfl:     triamcinolone acetonide (KENALOG) 0.1 % Cream, , Disp: , Rfl:     carvedilol (COREG) 6.25 MG Tab, TAKE 1 TABLET BY MOUTH TWICE DAILY WITH MEALS (Patient not taking: Reported on 9/15/2023), Disp: 180 Tablet, Rfl: 3    meloxicam (MOBIC) 15 MG tablet, Take 1 Tablet by mouth every day. (Patient not taking: Reported on 12/6/2023), Disp: 30 Tablet, Rfl: 0    estrogens, conjugated (PREMARIN) 0.625 MG/GM Cream, Insert 0.5 g vaginally, once daily for 3 weeks.  Then use every other day (Patient not taking: Reported on 12/6/2023), Disp: 30 g, Rfl: 3    PROBLEMS:  Patient Active Problem List    Diagnosis Date Noted    Pure hyperglyceridemia 11/11/2022    Joint pain 11/11/2022    Heart murmur 11/11/2022    Gastroesophageal reflux disease 11/11/2022    Female infertility 11/11/2022    Depressive disorder 11/11/2022    Vaginal atrophy 03/25/2022    Well woman exam 03/25/2022    Diabetes mellitus (HCC) 06/12/2019    Vitamin D deficiency disease 12/02/2016    Morbid obesity (HCC) 05/31/2016    S/P gastric surgery 04/18/2016    Essential hypertension 07/20/2012    Preop cardiovascular exam 07/20/2012    Nonspecific abnormal electrocardiogram (ECG) (EKG) 07/20/2012       REVIEW OF SYSTEMS:  Gen.:  No Nausea, Vomiting, fever, Chills.  Heart: No chest pain.  Lungs:  No shortness of Breath.  Psychological: Rinku unusual Anxiety  "depression     PHYSICAL EXAM   Constitutional: Alert, cooperative, not in acute distress.  Cardiovascular:  Rate Rhythm is regular without murmurs rubs clicks.     Thorax & Lungs: Clear to auscultation, no wheezing, rhonchi, or rales  HENT: Normocephalic, Atraumatic.  Eyes: PERRLA, EOMI, Conjunctiva normal.   Neck: Trachia is midline no swelling of the thyroid.   Lymphatic: No lymphadenopathy noted.   Neurologic: Alert & oriented x 3, cranial nerves II through XII are intact, Normal motor function, Normal sensory function, No focal deficits noted.   Psychiatric: Affect normal, Judgment normal, Mood normal.     VITAL SIGNS:/80   Pulse 84   Temp 36.6 °C (97.8 °F) (Temporal)   Resp 16   Ht 1.626 m (5' 4\")   Wt (!) 144 kg (316 lb 12.8 oz)   LMP 03/01/2016   SpO2 97%   BMI 54.38 kg/m²     Labs: Reviewed    Assessment:                                                     Plan:    1. Type 2 diabetes mellitus without complication, without long-term current use of insulin (HCC)  Increase dosage of medication  - Dulaglutide 0.75 MG/0.5ML Solution Pen-injector; Inject 1 mL under the skin every 7 days.  Dispense: 1.96 mL; Refill: 5  - MICROALBUMIN CREAT RATIO URINE; Future  - Comp Metabolic Panel; Future    2. Morbid obesity (HCC)  No change in medical therapy with the exception of increased dose to 1.5 mg weekly  - Dulaglutide 0.75 MG/0.5ML Solution Pen-injector; Inject 1 mL under the skin every 7 days.  Dispense: 1.96 mL; Refill: 5  - Comp Metabolic Panel; Future    3. 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 Dyslipidemia  Recheck lipid profile  - Lipid Profile; Future  - Comp Metabolic Panel; Future    5. Screen for colon cancer  Referral written to gastroenterology  - Referral to Gastroenterology        "

## 2023-12-19 ENCOUNTER — APPOINTMENT (OUTPATIENT)
Dept: MEDICAL GROUP | Facility: LAB | Age: 53
End: 2023-12-19
Payer: COMMERCIAL

## 2024-01-12 ENCOUNTER — PATIENT MESSAGE (OUTPATIENT)
Dept: MEDICAL GROUP | Facility: LAB | Age: 54
End: 2024-01-12
Payer: COMMERCIAL

## 2024-01-12 DIAGNOSIS — B37.31 VAGINAL YEAST INFECTION: ICD-10-CM

## 2024-01-12 RX ORDER — FLUCONAZOLE 150 MG/1
150 TABLET ORAL DAILY
Qty: 2 TABLET | Refills: 0 | Status: SHIPPED | OUTPATIENT
Start: 2024-01-12

## 2024-01-26 ENCOUNTER — PATIENT MESSAGE (OUTPATIENT)
Dept: MEDICAL GROUP | Facility: LAB | Age: 54
End: 2024-01-26
Payer: COMMERCIAL

## 2024-01-26 DIAGNOSIS — E11.9 TYPE 2 DIABETES MELLITUS WITHOUT COMPLICATION, WITHOUT LONG-TERM CURRENT USE OF INSULIN (HCC): ICD-10-CM

## 2024-01-26 DIAGNOSIS — Z98.890 S/P GASTRIC SURGERY: ICD-10-CM

## 2024-01-29 ENCOUNTER — PATIENT MESSAGE (OUTPATIENT)
Dept: MEDICAL GROUP | Facility: LAB | Age: 54
End: 2024-01-29
Payer: COMMERCIAL

## 2024-01-30 ENCOUNTER — PATIENT MESSAGE (OUTPATIENT)
Dept: MEDICAL GROUP | Facility: LAB | Age: 54
End: 2024-01-30
Payer: COMMERCIAL

## 2024-01-30 DIAGNOSIS — E11.69 TYPE 2 DIABETES MELLITUS WITH OTHER SPECIFIED COMPLICATION, WITHOUT LONG-TERM CURRENT USE OF INSULIN (HCC): ICD-10-CM

## 2024-03-04 ENCOUNTER — PATIENT MESSAGE (OUTPATIENT)
Dept: MEDICAL GROUP | Facility: LAB | Age: 54
End: 2024-03-04
Payer: COMMERCIAL

## 2024-03-04 DIAGNOSIS — E66.01 MORBID OBESITY (HCC): ICD-10-CM

## 2024-03-04 RX ORDER — PHENTERMINE HYDROCHLORIDE 15 MG/1
15 CAPSULE ORAL EVERY MORNING
Qty: 30 CAPSULE | Refills: 0 | Status: SHIPPED | OUTPATIENT
Start: 2024-03-04 | End: 2024-04-03

## 2024-03-04 RX ORDER — PHENTERMINE HYDROCHLORIDE 15 MG/1
15 CAPSULE ORAL EVERY MORNING
Qty: 30 CAPSULE | Refills: 0 | Status: SHIPPED | OUTPATIENT
Start: 2024-03-04 | End: 2024-03-04

## 2024-03-04 RX ORDER — PHENTERMINE HYDROCHLORIDE 15 MG/1
15 CAPSULE ORAL EVERY MORNING
Qty: 30 CAPSULE | Refills: 0 | Status: SHIPPED | OUTPATIENT
Start: 2024-05-03 | End: 2024-06-02

## 2024-03-04 RX ORDER — PHENTERMINE HYDROCHLORIDE 15 MG/1
15 CAPSULE ORAL EVERY MORNING
Qty: 30 CAPSULE | Refills: 0 | Status: SHIPPED | OUTPATIENT
Start: 2024-04-03 | End: 2024-05-03

## 2024-03-27 ENCOUNTER — PATIENT MESSAGE (OUTPATIENT)
Dept: MEDICAL GROUP | Facility: LAB | Age: 54
End: 2024-03-27
Payer: COMMERCIAL

## 2024-04-04 ENCOUNTER — APPOINTMENT (RX ONLY)
Dept: URBAN - METROPOLITAN AREA CLINIC 22 | Facility: CLINIC | Age: 54
Setting detail: DERMATOLOGY
End: 2024-04-04

## 2024-04-04 DIAGNOSIS — L73.8 OTHER SPECIFIED FOLLICULAR DISORDERS: ICD-10-CM

## 2024-04-04 DIAGNOSIS — Q828 OTHER SPECIFIED ANOMALIES OF SKIN: ICD-10-CM

## 2024-04-04 DIAGNOSIS — D18.0 HEMANGIOMA: ICD-10-CM

## 2024-04-04 DIAGNOSIS — Z71.89 OTHER SPECIFIED COUNSELING: ICD-10-CM

## 2024-04-04 DIAGNOSIS — Q819 OTHER SPECIFIED ANOMALIES OF SKIN: ICD-10-CM

## 2024-04-04 DIAGNOSIS — L81.4 OTHER MELANIN HYPERPIGMENTATION: ICD-10-CM

## 2024-04-04 DIAGNOSIS — Z85.828 PERSONAL HISTORY OF OTHER MALIGNANT NEOPLASM OF SKIN: ICD-10-CM

## 2024-04-04 DIAGNOSIS — D22 MELANOCYTIC NEVI: ICD-10-CM

## 2024-04-04 DIAGNOSIS — Q826 OTHER SPECIFIED ANOMALIES OF SKIN: ICD-10-CM

## 2024-04-04 DIAGNOSIS — B07.8 OTHER VIRAL WARTS: ICD-10-CM

## 2024-04-04 DIAGNOSIS — L91.8 OTHER HYPERTROPHIC DISORDERS OF THE SKIN: ICD-10-CM

## 2024-04-04 DIAGNOSIS — L82.1 OTHER SEBORRHEIC KERATOSIS: ICD-10-CM

## 2024-04-04 DIAGNOSIS — L82.0 INFLAMED SEBORRHEIC KERATOSIS: ICD-10-CM

## 2024-04-04 PROBLEM — D22.5 MELANOCYTIC NEVI OF TRUNK: Status: ACTIVE | Noted: 2024-04-04

## 2024-04-04 PROBLEM — D18.01 HEMANGIOMA OF SKIN AND SUBCUTANEOUS TISSUE: Status: ACTIVE | Noted: 2024-04-04

## 2024-04-04 PROBLEM — Q82.8 OTHER SPECIFIED CONGENITAL MALFORMATIONS OF SKIN: Status: ACTIVE | Noted: 2024-04-04

## 2024-04-04 PROCEDURE — ? ADDITIONAL NOTES

## 2024-04-04 PROCEDURE — 99213 OFFICE O/P EST LOW 20 MIN: CPT | Mod: 25

## 2024-04-04 PROCEDURE — ? LIQUID NITROGEN

## 2024-04-04 PROCEDURE — ? OTC TREATMENT REGIMEN

## 2024-04-04 PROCEDURE — 17110 DESTRUCTION B9 LES UP TO 14: CPT

## 2024-04-04 PROCEDURE — ? SUNSCREEN RECOMMENDATIONS

## 2024-04-04 PROCEDURE — ? COUNSELING

## 2024-04-04 ASSESSMENT — LOCATION ZONE DERM
LOCATION ZONE: NOSE
LOCATION ZONE: EYELID
LOCATION ZONE: FACE
LOCATION ZONE: TRUNK
LOCATION ZONE: LEG
LOCATION ZONE: HAND
LOCATION ZONE: NECK
LOCATION ZONE: ARM

## 2024-04-04 ASSESSMENT — LOCATION DETAILED DESCRIPTION DERM
LOCATION DETAILED: LEFT ANTERIOR DISTAL THIGH
LOCATION DETAILED: LEFT CHIN
LOCATION DETAILED: NASAL ROOT
LOCATION DETAILED: RIGHT CLAVICULAR NECK
LOCATION DETAILED: EPIGASTRIC SKIN
LOCATION DETAILED: RIGHT SUPERIOR MEDIAL MIDBACK
LOCATION DETAILED: RIGHT DISTAL POSTERIOR UPPER ARM
LOCATION DETAILED: LEFT SUPERIOR MEDIAL UPPER BACK
LOCATION DETAILED: LEFT RADIAL DORSAL HAND
LOCATION DETAILED: LEFT LATERAL SUPERIOR TARSAL REGION
LOCATION DETAILED: LEFT DISTAL POSTERIOR UPPER ARM

## 2024-04-04 ASSESSMENT — LOCATION SIMPLE DESCRIPTION DERM
LOCATION SIMPLE: LEFT LATERAL SUPERIOR TARSAL REGION
LOCATION SIMPLE: NOSE
LOCATION SIMPLE: RIGHT LOWER BACK
LOCATION SIMPLE: RIGHT ANTERIOR NECK
LOCATION SIMPLE: LEFT THIGH
LOCATION SIMPLE: CHIN
LOCATION SIMPLE: LEFT POSTERIOR UPPER ARM
LOCATION SIMPLE: ABDOMEN
LOCATION SIMPLE: LEFT HAND
LOCATION SIMPLE: RIGHT POSTERIOR UPPER ARM
LOCATION SIMPLE: LEFT UPPER BACK

## 2024-04-04 NOTE — PROCEDURE: OTC TREATMENT REGIMEN
Detail Level: Zone
Patient Specific Otc Recommendations (Will Not Stick From Patient To Patient): Pt has been using otc Amlactin rough and bumpy lotion. Recommended to continue using it.

## 2024-04-04 NOTE — PROCEDURE: LIQUID NITROGEN
Add 52 Modifier (Optional): no
Show Spray Paint Technique Variable?: Yes
Duration Of Freeze Thaw-Cycle (Seconds): 0
Detail Level: Detailed
Medical Necessity Information: It is in your best interest to select a reason for this procedure from the list below. All of these items fulfill various CMS LCD requirements except the new and changing color options.
Post-Care Instructions: I reviewed with the patient in detail post-care instructions. Patient is to wear sunprotection, and avoid picking at any of the treated lesions. Pt may apply Vaseline to crusted or scabbing areas.
Medical Necessity Clause: This procedure was medically necessary because the lesions that were treated were:
Spray Paint Text: The liquid nitrogen was applied to the skin utilizing a spray paint frosting technique.
Consent: The patient's verbal consent was obtained including but not limited to risks of crusting, scabbing, blistering, scarring, darker or lighter pigmentary change, recurrence, incomplete removal and infection.
Application Tool (Optional): Forceps
Duration Of Freeze Thaw-Cycle (Seconds): 3
Number Of Freeze-Thaw Cycles: 3 freeze-thaw cycles

## 2024-04-21 ENCOUNTER — PATIENT MESSAGE (OUTPATIENT)
Dept: MEDICAL GROUP | Facility: LAB | Age: 54
End: 2024-04-21
Payer: COMMERCIAL

## 2024-04-21 DIAGNOSIS — I10 ESSENTIAL HYPERTENSION: ICD-10-CM

## 2024-04-23 ENCOUNTER — PATIENT MESSAGE (OUTPATIENT)
Dept: MEDICAL GROUP | Facility: LAB | Age: 54
End: 2024-04-23
Payer: COMMERCIAL

## 2024-04-23 DIAGNOSIS — I10 HYPERTENSION, UNSPECIFIED TYPE: ICD-10-CM

## 2024-04-23 RX ORDER — LOSARTAN POTASSIUM 50 MG/1
50 TABLET ORAL DAILY
Qty: 90 TABLET | Refills: 3 | Status: SHIPPED | OUTPATIENT
Start: 2024-04-23

## 2024-05-08 ENCOUNTER — APPOINTMENT (OUTPATIENT)
Dept: OBGYN | Facility: CLINIC | Age: 54
End: 2024-05-08
Payer: COMMERCIAL

## 2024-05-17 ENCOUNTER — OFFICE VISIT (OUTPATIENT)
Dept: URGENT CARE | Facility: PHYSICIAN GROUP | Age: 54
End: 2024-05-17
Payer: COMMERCIAL

## 2024-05-17 VITALS
HEART RATE: 92 BPM | TEMPERATURE: 97.8 F | WEIGHT: 293 LBS | DIASTOLIC BLOOD PRESSURE: 74 MMHG | BODY MASS INDEX: 50.02 KG/M2 | HEIGHT: 64 IN | RESPIRATION RATE: 18 BRPM | OXYGEN SATURATION: 94 % | SYSTOLIC BLOOD PRESSURE: 138 MMHG

## 2024-05-17 DIAGNOSIS — R49.1 LOSS OF VOICE: ICD-10-CM

## 2024-05-17 DIAGNOSIS — J01.40 ACUTE NON-RECURRENT PANSINUSITIS: ICD-10-CM

## 2024-05-17 PROCEDURE — 99213 OFFICE O/P EST LOW 20 MIN: CPT | Performed by: PHYSICIAN ASSISTANT

## 2024-05-17 PROCEDURE — 3075F SYST BP GE 130 - 139MM HG: CPT | Performed by: PHYSICIAN ASSISTANT

## 2024-05-17 PROCEDURE — 3078F DIAST BP <80 MM HG: CPT | Performed by: PHYSICIAN ASSISTANT

## 2024-05-17 RX ORDER — AZITHROMYCIN 250 MG/1
TABLET, FILM COATED ORAL
Qty: 6 TABLET | Refills: 0 | Status: SHIPPED | OUTPATIENT
Start: 2024-05-17

## 2024-05-17 RX ORDER — METHYLPREDNISOLONE 4 MG/1
4 TABLET ORAL DAILY
Qty: 21 TABLET | Refills: 0 | Status: SHIPPED | OUTPATIENT
Start: 2024-05-17

## 2024-05-17 RX ORDER — FLUTICASONE PROPIONATE 50 MCG
1 SPRAY, SUSPENSION (ML) NASAL DAILY
Qty: 16 G | Refills: 0 | Status: SHIPPED | OUTPATIENT
Start: 2024-05-17

## 2024-05-17 ASSESSMENT — ENCOUNTER SYMPTOMS
EYE REDNESS: 0
DIAPHORESIS: 0
HEADACHES: 1
EYE DISCHARGE: 0
DIZZINESS: 0
FEVER: 0
WHEEZING: 0
CONSTIPATION: 0
EYE PAIN: 0
SHORTNESS OF BREATH: 0
NAUSEA: 0
ABDOMINAL PAIN: 0
SINUS PAIN: 1
SORE THROAT: 1
DIARRHEA: 0
VOMITING: 0
COUGH: 0
CHILLS: 0

## 2024-05-17 NOTE — PROGRESS NOTES
Subjective:     Logan Mahoney  is a 54 y.o. female who presents for Sinus Problem (Low grade fever, losing voice, ears clogged, glands painful, sinus headache, sore throat and funky stuff coming out nose. X 5 days )       She presents today with sinus pain and pressure has been ongoing for the last 5 days.  Symptoms have been worsening since onset.  Notes low-grade fever.  Has also been experiencing bilateral ear fullness, sinus headache, sore throat and loss of voice.  Has been using allergy medication and Tylenol/ibuprofen for symptom support.  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  At this time she denies any chest pain or shortness of breath, no nausea or vomiting, no abdominal pain, no diarrhea       Review of Systems   Constitutional:  Negative for chills, diaphoresis, fever and malaise/fatigue.   HENT:  Positive for congestion, ear pain, sinus pain and sore throat. Negative for ear discharge.    Eyes:  Negative for pain, discharge and redness.   Respiratory:  Negative for cough, shortness of breath and wheezing.    Cardiovascular:  Negative for chest pain.   Gastrointestinal:  Negative for abdominal pain, constipation, diarrhea, nausea and vomiting.   Neurological:  Positive for headaches. Negative for dizziness.      Allergies   Allergen Reactions    Cefaclor Vomiting and Unspecified    Cinnamon Vomiting    Eggs Vomiting    Latex Hives    Losartan Itching    Peppers [[employer] Allergy] Vomiting    Pineapple Hives and Nausea     Only skin     Past Medical History:   Diagnosis Date    Anemia     Anesthesia     ponv    Arthritis     knees/neck/elbows    Benign hypertension 7/20/2012    Cancer ([facility]) 2019    Melanoma    DM II (diabetes mellitus, type II), controlled ([facility]) 6/12/2019    Heart burn     abdominal    Morbid obesity ([facility]) 5/31/2016    Nonspecific abnormal electrocardiogram (ECG) (EKG) 7/20/2012    Preop cardiovascular exam 7/20/2012    S/P gastric surgery 4/18/2016  "   [facility]     Vitamin D deficiency disease 12/2/2016        Objective:   /74   Pulse 92   Temp 36.6 °C (97.8 °F) (Temporal)   Resp 18   Ht 1.626 m (5' 4\")   Wt (!) 136 kg (299 lb)   LMP 03/01/2016   SpO2 94%   BMI 51.32 kg/m²   Physical Exam  Vitals and nursing note reviewed.   Constitutional:       General: She is not in acute distress.     Appearance: Normal appearance. She is not ill-appearing, toxic-appearing or diaphoretic.   HENT:      Head: Normocephalic.      Right Ear: Tympanic membrane, ear canal and external ear normal. There is no impacted cerumen.      Left Ear: Tympanic membrane, ear canal and external ear normal. There is no impacted cerumen.      Nose: Congestion present. No rhinorrhea.      Mouth/Throat:      Mouth: Mucous membranes are moist.      Pharynx: No oropharyngeal exudate or posterior oropharyngeal erythema.   Eyes:      General:         Right eye: No discharge.         Left eye: No discharge.      Conjunctiva/sclera: Conjunctivae normal.   Cardiovascular:      Rate and Rhythm: Normal rate and regular rhythm.   Pulmonary:      Effort: Pulmonary effort is normal. No respiratory distress.      Breath sounds: Normal breath sounds. No stridor. No wheezing or rhonchi.   Musculoskeletal:      Cervical back: Neck supple.   Lymphadenopathy:      Cervical: No cervical adenopathy.   Neurological:      General: No focal deficit present.      Mental Status: She is alert and oriented to person, place, and time.   Psychiatric:         Mood and Affect: Mood normal.         Behavior: Behavior normal.         Thought Content: Thought content normal.         Judgment: Judgment normal.           Diagnostic testing: None    Assessment/Plan:     Encounter Diagnoses   Name Primary?    Acute non-recurrent pansinusitis     Loss of voice           Plan for care for today's complaint includes starting the patient on Flonase and Medrol Dosepak for acute pansinusitis.  Provided a " contingency prescription for Z-Kimo to be used if symptoms or not improving over the next 3-5 days.  Vital signs were stable during today's office visit, patient was overall well-appearing. Continue to monitor symptoms and return to urgent care or follow-up with primary care provider if symptoms remain ongoing.  Follow-up in the emergency department if symptoms become severe, ER precautions discussed in office today..  Prescription for Flonase, Medrol Dosepak provided.    See AVS Instructions below for written guidance provided to patient on after-visit management and care in addition to our verbal discussion during the visit.    Please note that this dictation was created using voice recognition software. I have attempted to correct all errors, but there may be sound-alike, spelling, grammar and possibly content errors that I did not discover before finalizing the note.    Raymundo Carter PA-C

## 2024-06-19 ENCOUNTER — APPOINTMENT (OUTPATIENT)
Dept: MEDICAL GROUP | Facility: LAB | Age: 54
End: 2024-06-19
Payer: COMMERCIAL

## 2024-06-25 ENCOUNTER — APPOINTMENT (OUTPATIENT)
Dept: MEDICAL GROUP | Facility: LAB | Age: 54
End: 2024-06-25
Payer: COMMERCIAL

## 2024-07-14 DIAGNOSIS — J01.40 ACUTE NON-RECURRENT PANSINUSITIS: ICD-10-CM

## 2024-08-26 ENCOUNTER — APPOINTMENT (OUTPATIENT)
Dept: MEDICAL GROUP | Facility: LAB | Age: 54
End: 2024-08-26
Payer: COMMERCIAL

## 2024-08-26 VITALS
DIASTOLIC BLOOD PRESSURE: 80 MMHG | RESPIRATION RATE: 16 BRPM | HEIGHT: 64 IN | TEMPERATURE: 97.6 F | SYSTOLIC BLOOD PRESSURE: 142 MMHG | BODY MASS INDEX: 48.83 KG/M2 | OXYGEN SATURATION: 94 % | WEIGHT: 286 LBS | HEART RATE: 84 BPM

## 2024-08-26 DIAGNOSIS — Z23 NEED FOR SHINGLES VACCINE: ICD-10-CM

## 2024-08-26 DIAGNOSIS — E11.9 TYPE 2 DIABETES MELLITUS WITHOUT COMPLICATION, UNSPECIFIED WHETHER LONG TERM INSULIN USE (HCC): ICD-10-CM

## 2024-08-26 DIAGNOSIS — Z12.11 SCREEN FOR COLON CANCER: ICD-10-CM

## 2024-08-26 DIAGNOSIS — Z12.31 ENCOUNTER FOR SCREENING MAMMOGRAM FOR MALIGNANT NEOPLASM OF BREAST: ICD-10-CM

## 2024-08-26 DIAGNOSIS — Z63.6 CAREGIVER STRESS: ICD-10-CM

## 2024-08-26 DIAGNOSIS — G43.809 OTHER MIGRAINE WITHOUT STATUS MIGRAINOSUS, NOT INTRACTABLE: ICD-10-CM

## 2024-08-26 LAB
HBA1C MFR BLD: 13.5 % (ref ?–5.8)
POCT INT CON NEG: NEGATIVE
POCT INT CON POS: POSITIVE

## 2024-08-26 PROCEDURE — 3079F DIAST BP 80-89 MM HG: CPT | Performed by: INTERNAL MEDICINE

## 2024-08-26 PROCEDURE — 99214 OFFICE O/P EST MOD 30 MIN: CPT | Performed by: INTERNAL MEDICINE

## 2024-08-26 PROCEDURE — 83036 HEMOGLOBIN GLYCOSYLATED A1C: CPT | Performed by: INTERNAL MEDICINE

## 2024-08-26 PROCEDURE — 3077F SYST BP >= 140 MM HG: CPT | Performed by: INTERNAL MEDICINE

## 2024-08-26 RX ORDER — ALMOTRIPTAN 12.5 MG/1
12.5 TABLET, FILM COATED ORAL
Qty: 10 TABLET | Refills: 2 | Status: SHIPPED | OUTPATIENT
Start: 2024-08-26

## 2024-08-26 SDOH — SOCIAL STABILITY - SOCIAL INSECURITY: DEPENDENT RELATIVE NEEDING CARE AT HOME: Z63.6

## 2024-08-26 NOTE — PROGRESS NOTES
CC: Logan Mahoney is a 54 y.o. female is suffering from   Chief Complaint   Patient presents with    Follow-Up         SUBJECTIVE:  1. Screen for colon cancer  Pamela is here for follow-up is in need of screening for colon cancer referral rewritten    2. Encounter for screening mammogram for malignant neoplasm of breast  Screening mammogram orders written    3. Type 2 diabetes mellitus without complication, unspecified whether long term insulin use (HCC)  Patient to double her metformin to 2000 international units each day    4. Need for shingles vaccine  Prescription written    5. Other migraine without status migrainosus, not intractable  Prescription rewritten for Axert    6. Caregiver stress  Patient and I have discussed that she has significant stress caring for her mother who suffers from leukemia    History of Present Illness      Past social, family, history:   Social History     Tobacco Use    Smoking status: Never     Passive exposure: Never    Smokeless tobacco: Never   Vaping Use    Vaping status: Never Used   Substance Use Topics    Alcohol use: No    Drug use: No         MEDICATIONS:    Current Outpatient Medications:     NON SPECIFIED, Please vaccinate with Shingrix vaccine., Disp: 1 Each, Rfl: 1    almotriptan (AXERT) 12.5 MG tablet, Take 1 Tablet by mouth one time as needed for Migraine for up to 1 dose., Disp: 10 Tablet, Rfl: 2    metFORMIN (GLUCOPHAGE) 500 MG Tab, Take 2 Tablets by mouth 2 times a day with meals., Disp: 360 Tablet, Rfl: 3    methylPREDNISolone (MEDROL DOSEPAK) 4 MG Tablet Therapy Pack, Take 1 Tablet by mouth every day. Follow schedule on package instructions., Disp: 21 Tablet, Rfl: 0    fluticasone (FLONASE) 50 MCG/ACT nasal spray, Administer 1 Spray into affected nostril(S) every day., Disp: 16 g, Rfl: 0    azithromycin (ZITHROMAX) 250 MG Tab, Take 2 tablets by mouth on day one. Take one tablet by mouth the remaining days until gone, Disp: 6 Tablet, Rfl: 0     losartan (COZAAR) 50 MG Tab, Take 1 Tablet by mouth every day., Disp: 90 Tablet, Rfl: 3    meloxicam (MOBIC) 15 MG tablet, TAKE 1 TABLET BY MOUTH EVERY DAY, Disp: 60 Tablet, Rfl: 0    exenatide (BYETTA) 5 MCG/0.02ML Solution Pen-injector injection, Inject 5 mcg under the skin 2 times daily, before breakfast and dinner., Disp: 1.2 mL, Rfl: 5    fluconazole (DIFLUCAN) 150 MG tablet, Take 1 Tablet by mouth every day., Disp: 2 Tablet, Rfl: 0    metFORMIN (GLUCOPHAGE) 500 MG Tab, TAKE 1 TABLET BY MOUTH TWICE DAILY WITH MEALS (Patient not taking: Reported on 12/6/2023), Disp: 180 Tablet, Rfl: 3    metFORMIN (GLUCOPHAGE) 500 MG Tab, TAKE 2 TABLETS(1000 MG) BY MOUTH EVERY DAY, Disp: 180 Tablet, Rfl: 3    azelastine (ASTELIN) 137 MCG/SPRAY nasal spray, 2 Sprays 2 times a day., Disp: , Rfl:     fluticasone (FLONASE) 50 MCG/ACT nasal spray, , Disp: , Rfl:     hydrocortisone 2.5 % Cream topical cream, APPLY TOPICALLY EVERY DAY TO ARMPITS AND AFFECTED AREAS, Disp: , Rfl:     triamcinolone acetonide (KENALOG) 0.1 % Cream, , Disp: , Rfl:     carvedilol (COREG) 6.25 MG Tab, TAKE 1 TABLET BY MOUTH TWICE DAILY WITH MEALS (Patient not taking: Reported on 9/15/2023), Disp: 180 Tablet, Rfl: 3    meloxicam (MOBIC) 15 MG tablet, Take 1 Tablet by mouth every day. (Patient not taking: Reported on 12/6/2023), Disp: 30 Tablet, Rfl: 0    Blood Glucose Test Strips, Test strips order: Test strips for One Touch Ultra meter. Sig: use qd and prn ssx high or low sugar #100 RF x 3, Disp: 100 Strip, Rfl: 3    estrogens, conjugated (PREMARIN) 0.625 MG/GM Cream, Insert 0.5 g vaginally, once daily for 3 weeks.  Then use every other day, Disp: 30 g, Rfl: 3    Misc. Devices Misc, Nocturnal desaturation study., Disp: 1 Each, Rfl: 0    Blood Glucose Monitoring Suppl (ONE TOUCH ULTRA 2) w/Device Kit, USE FOR BLOOD SUGAR TEST UTD, Disp: , Rfl: 0    ONE TOUCH ULTRA TEST strip, USE ONE STRIP FOR BLOOD SUGAR TEST D AND PRN, Disp: , Rfl: 3    ONETOUCH DELICA  "LANCETS 33G Misc, USE ONE LANCET FOR BLOOD SUGAR TEST D AND PRN, Disp: , Rfl: 3    NON SPECIFIED, Glucometer and Test Strips and related supplies as covered by insurance.  QS for 90 days check qd and prn, Disp: 150 Each, Rfl: 3    vitamin D (CHOLECALCIFEROL) 1000 UNIT Tab, Take 1 Tablet by mouth every day., Disp: , Rfl:     PROBLEMS:  Patient Active Problem List    Diagnosis Date Noted    Pure hyperglyceridemia 11/11/2022    Joint pain 11/11/2022    Heart murmur 11/11/2022    Gastroesophageal reflux disease 11/11/2022    Female infertility 11/11/2022    Depressive disorder 11/11/2022    Vaginal atrophy 03/25/2022    Well woman exam 03/25/2022    Diabetes mellitus (HCC) 06/12/2019    Vitamin D deficiency disease 12/02/2016    Morbid obesity (HCC) 05/31/2016    S/P gastric surgery 04/18/2016    Essential hypertension 07/20/2012    Preop cardiovascular exam 07/20/2012    Nonspecific abnormal electrocardiogram (ECG) (EKG) 07/20/2012       REVIEW OF SYSTEMS:  Gen.:  No Nausea, Vomiting, fever, Chills.  Heart: No chest pain.  Lungs:  No shortness of Breath.  Psychological: Rinku unusual Anxiety depression     PHYSICAL EXAM   Physical Exam       Constitutional: Alert, cooperative, not in acute distress.  Cardiovascular:  Rate Rhythm is regular without murmurs rubs clicks.     Thorax & Lungs: Clear to auscultation, no wheezing, rhonchi, or rales  HENT: Normocephalic, Atraumatic.  Eyes: PERRLA, EOMI, Conjunctiva normal.   Neck: Trachia is midline no swelling of the thyroid.   Lymphatic: No lymphadenopathy noted.   Neurologic: Alert & oriented x 3, cranial nerves II through XII are intact, Normal motor function, Normal sensory function, No focal deficits noted.   Psychiatric: Affect normal, Judgment normal, Mood normal.     VITAL SIGNS:BP (!) 142/80 (BP Location: Left arm, Patient Position: Sitting, BP Cuff Size: Large adult)   Pulse 84   Temp 36.4 °C (97.6 °F)   Resp 16   Ht 1.626 m (5' 4\")   Wt (!) 130 kg (286 lb)   " LMP 03/01/2016   SpO2 94%   BMI 49.09 kg/m²     Labs: Reviewed    Assessment:                                                     Plan:  Assessment & Plan     1. Screen for colon cancer  Referral written  - Referral to Gastroenterology    2. Encounter for screening mammogram for malignant neoplasm of breast  Screening mammogram ordered  - MA-SCREENING MAMMO BILAT W/CAD; Future    3. Type 2 diabetes mellitus without complication, unspecified whether long term insulin use (HCC)  Double metformin to 1000 mg twice daily  - POCT  A1C  - Comp Metabolic Panel; Future  - CBC WITH DIFFERENTIAL; Future  - Lipid Profile; Future  - HEMOGLOBIN A1C; Future  - metFORMIN (GLUCOPHAGE) 500 MG Tab; Take 2 Tablets by mouth 2 times a day with meals.  Dispense: 360 Tablet; Refill: 3    4. Need for shingles vaccine  Prescription written  - NON SPECIFIED; Please vaccinate with Shingrix vaccine.  Dispense: 1 Each; Refill: 1    5. Other migraine without status migrainosus, not intractable  Start Axert  - almotriptan (AXERT) 12.5 MG tablet; Take 1 Tablet by mouth one time as needed for Migraine for up to 1 dose.  Dispense: 10 Tablet; Refill: 2    6. Caregiver stress  No change in medical therapy, patient with mother (Jany) suffering from leukemia

## 2024-09-23 DIAGNOSIS — E11.9 TYPE 2 DIABETES MELLITUS WITHOUT COMPLICATION, WITHOUT LONG-TERM CURRENT USE OF INSULIN (HCC): ICD-10-CM

## 2024-09-27 ENCOUNTER — TELEPHONE (OUTPATIENT)
Dept: HEALTH INFORMATION MANAGEMENT | Facility: OTHER | Age: 54
End: 2024-09-27
Payer: COMMERCIAL

## 2024-10-07 ENCOUNTER — APPOINTMENT (RX ONLY)
Dept: URBAN - METROPOLITAN AREA CLINIC 22 | Facility: CLINIC | Age: 54
Setting detail: DERMATOLOGY
End: 2024-10-07

## 2024-10-07 DIAGNOSIS — Z71.89 OTHER SPECIFIED COUNSELING: ICD-10-CM

## 2024-10-07 DIAGNOSIS — L82.0 INFLAMED SEBORRHEIC KERATOSIS: ICD-10-CM

## 2024-10-07 DIAGNOSIS — D18.0 HEMANGIOMA: ICD-10-CM

## 2024-10-07 DIAGNOSIS — D22 MELANOCYTIC NEVI: ICD-10-CM

## 2024-10-07 DIAGNOSIS — Z85.828 PERSONAL HISTORY OF OTHER MALIGNANT NEOPLASM OF SKIN: ICD-10-CM

## 2024-10-07 DIAGNOSIS — L73.8 OTHER SPECIFIED FOLLICULAR DISORDERS: ICD-10-CM

## 2024-10-07 DIAGNOSIS — L82.1 OTHER SEBORRHEIC KERATOSIS: ICD-10-CM

## 2024-10-07 DIAGNOSIS — L57.8 OTHER SKIN CHANGES DUE TO CHRONIC EXPOSURE TO NONIONIZING RADIATION: ICD-10-CM

## 2024-10-07 DIAGNOSIS — L81.4 OTHER MELANIN HYPERPIGMENTATION: ICD-10-CM

## 2024-10-07 PROBLEM — D22.5 MELANOCYTIC NEVI OF TRUNK: Status: ACTIVE | Noted: 2024-10-07

## 2024-10-07 PROBLEM — D18.01 HEMANGIOMA OF SKIN AND SUBCUTANEOUS TISSUE: Status: ACTIVE | Noted: 2024-10-07

## 2024-10-07 PROCEDURE — 17110 DESTRUCTION B9 LES UP TO 14: CPT

## 2024-10-07 PROCEDURE — ? LIQUID NITROGEN

## 2024-10-07 PROCEDURE — ? ADDITIONAL NOTES

## 2024-10-07 PROCEDURE — ? SUNSCREEN RECOMMENDATIONS

## 2024-10-07 PROCEDURE — ? COUNSELING

## 2024-10-07 PROCEDURE — ? ORDER FOR PHOTODYNAMIC THERAPY

## 2024-10-07 PROCEDURE — 99213 OFFICE O/P EST LOW 20 MIN: CPT | Mod: 25

## 2024-10-07 ASSESSMENT — LOCATION ZONE DERM
LOCATION ZONE: NOSE
LOCATION ZONE: ARM
LOCATION ZONE: FACE
LOCATION ZONE: NECK
LOCATION ZONE: TRUNK

## 2024-10-07 ASSESSMENT — LOCATION SIMPLE DESCRIPTION DERM
LOCATION SIMPLE: ABDOMEN
LOCATION SIMPLE: LEFT SHOULDER
LOCATION SIMPLE: NOSE
LOCATION SIMPLE: LEFT UPPER BACK
LOCATION SIMPLE: LEFT ANTERIOR NECK
LOCATION SIMPLE: CHEST
LOCATION SIMPLE: RIGHT FOREHEAD
LOCATION SIMPLE: LEFT CLAVICULAR SKIN
LOCATION SIMPLE: RIGHT LOWER BACK
LOCATION SIMPLE: RIGHT CHEEK

## 2024-10-07 ASSESSMENT — LOCATION DETAILED DESCRIPTION DERM
LOCATION DETAILED: LEFT SUPERIOR MEDIAL UPPER BACK
LOCATION DETAILED: LEFT CLAVICULAR NECK
LOCATION DETAILED: LEFT CLAVICULAR SKIN
LOCATION DETAILED: NASAL ROOT
LOCATION DETAILED: LEFT ANTERIOR SHOULDER
LOCATION DETAILED: LEFT LATERAL SUPERIOR CHEST
LOCATION DETAILED: LEFT INFERIOR ANTERIOR NECK
LOCATION DETAILED: LEFT MEDIAL SUPERIOR CHEST
LOCATION DETAILED: STERNAL NOTCH
LOCATION DETAILED: RIGHT INFERIOR MEDIAL FOREHEAD
LOCATION DETAILED: RIGHT MEDIAL SUPERIOR CHEST
LOCATION DETAILED: RIGHT INFERIOR CENTRAL MALAR CHEEK
LOCATION DETAILED: EPIGASTRIC SKIN
LOCATION DETAILED: LEFT INFERIOR LATERAL NECK
LOCATION DETAILED: MIDDLE STERNUM
LOCATION DETAILED: RIGHT SUPERIOR MEDIAL MIDBACK

## 2024-10-07 NOTE — PROCEDURE: ORDER FOR PHOTODYNAMIC THERAPY
Face Incubation Time: 2 hours
Face And Neck Incubation Time: 1 Hour
Face And Scalp Incubation Time: 1 Hour for the face and 2 Hours for the scalp
Location: Face
History Of Hsv (Optional): No
Consent: The procedure and risks were reviewed with the patient including but not limited to: burning, pigmentary changes, pain, blistering, scabbing, redness, and the possibility of needing numerous treatments. Strict photoprotection after the procedure was also discussed.
Detail Level: Zone
Frequency Of Pdt: Single Treatment
Pdt Type: Red Light
Photosensitizer: Levulan

## 2024-10-07 NOTE — PROCEDURE: LIQUID NITROGEN
Render Post-Care Instructions In Note?: yes
Duration Of Freeze Thaw-Cycle (Seconds): 3
Medical Necessity Clause: This procedure was medically necessary because the lesions that were treated were:
Spray Paint Text: The liquid nitrogen was applied to the skin utilizing a spray paint frosting technique.
Render Note In Bullet Format When Appropriate: No
Medical Necessity Information: It is in your best interest to select a reason for this procedure from the list below. All of these items fulfill various CMS LCD requirements except the new and changing color options.
Consent: The patient's consent was obtained including but not limited to risks of crusting, scabbing, blistering, scarring, darker or lighter pigmentary change, recurrence, incomplete removal and infection.
Detail Level: Detailed
Application Tool (Optional): Forceps
Number Of Freeze-Thaw Cycles: 3 freeze-thaw cycles
Post-Care Instructions: I reviewed with the patient in detail post-care instructions. Patient is to wear sunprotection, and avoid picking at any of the treated lesions. Pt may apply Vaseline to crusted or scabbing areas.

## 2024-10-07 NOTE — PROCEDURE: ADDITIONAL NOTES
Detail Level: Simple
Additional Notes: Referred to Vivian Perez RN for treatment.
Render Risk Assessment In Note?: no

## 2024-10-12 RX ORDER — FLUTICASONE PROPIONATE 50 MCG
SPRAY, SUSPENSION (ML) NASAL
Qty: 16 G | Refills: 0 | OUTPATIENT
Start: 2024-10-12

## 2024-10-18 ENCOUNTER — APPOINTMENT (RX ONLY)
Dept: URBAN - METROPOLITAN AREA CLINIC 20 | Facility: CLINIC | Age: 54
Setting detail: DERMATOLOGY
End: 2024-10-18

## 2024-10-18 DIAGNOSIS — L73.8 OTHER SPECIFIED FOLLICULAR DISORDERS: ICD-10-CM

## 2024-10-18 DIAGNOSIS — L82.1 OTHER SEBORRHEIC KERATOSIS: ICD-10-CM

## 2024-10-18 PROCEDURE — ? ELECTRODESICCATION (COSMETIC)

## 2024-10-18 PROCEDURE — ? LIQUID NITROGEN (COSMETIC)

## 2024-10-18 ASSESSMENT — LOCATION DETAILED DESCRIPTION DERM
LOCATION DETAILED: LEFT SUPERIOR LATERAL FOREHEAD
LOCATION DETAILED: LEFT MEDIAL FOREHEAD
LOCATION DETAILED: LEFT SUPERIOR CENTRAL BUCCAL CHEEK
LOCATION DETAILED: LEFT INFERIOR FOREHEAD
LOCATION DETAILED: RIGHT LATERAL MALAR CHEEK
LOCATION DETAILED: LEFT CENTRAL BUCCAL CHEEK
LOCATION DETAILED: RIGHT MEDIAL MALAR CHEEK
LOCATION DETAILED: RIGHT INFERIOR LATERAL FOREHEAD
LOCATION DETAILED: RIGHT INFERIOR MEDIAL FOREHEAD
LOCATION DETAILED: RIGHT INFERIOR CENTRAL MALAR CHEEK
LOCATION DETAILED: RIGHT CENTRAL MALAR CHEEK
LOCATION DETAILED: RIGHT SUPERIOR MEDIAL BUCCAL CHEEK
LOCATION DETAILED: GLABELLA
LOCATION DETAILED: RIGHT CENTRAL BUCCAL CHEEK
LOCATION DETAILED: LEFT LATERAL MALAR CHEEK
LOCATION DETAILED: LEFT LATERAL BUCCAL CHEEK
LOCATION DETAILED: RIGHT LOWER CUTANEOUS LIP
LOCATION DETAILED: LEFT INFERIOR MEDIAL FOREHEAD
LOCATION DETAILED: RIGHT FOREHEAD
LOCATION DETAILED: LEFT MID TEMPLE
LOCATION DETAILED: RIGHT SUPERIOR CENTRAL MALAR CHEEK
LOCATION DETAILED: RIGHT MID TEMPLE
LOCATION DETAILED: RIGHT INFERIOR FOREHEAD
LOCATION DETAILED: LEFT MEDIAL MALAR CHEEK
LOCATION DETAILED: LEFT SUPERIOR MEDIAL FOREHEAD
LOCATION DETAILED: LEFT INFERIOR CENTRAL MALAR CHEEK
LOCATION DETAILED: LEFT INFERIOR MEDIAL MALAR CHEEK
LOCATION DETAILED: LEFT INFERIOR LATERAL FOREHEAD
LOCATION DETAILED: LEFT FOREHEAD

## 2024-10-18 ASSESSMENT — LOCATION SIMPLE DESCRIPTION DERM
LOCATION SIMPLE: RIGHT LIP
LOCATION SIMPLE: LEFT CHEEK
LOCATION SIMPLE: RIGHT CHEEK
LOCATION SIMPLE: LEFT TEMPLE
LOCATION SIMPLE: LEFT FOREHEAD
LOCATION SIMPLE: GLABELLA
LOCATION SIMPLE: RIGHT FOREHEAD
LOCATION SIMPLE: RIGHT TEMPLE

## 2024-10-18 ASSESSMENT — LOCATION ZONE DERM
LOCATION ZONE: FACE
LOCATION ZONE: LIP

## 2024-10-18 NOTE — PROCEDURE: LIQUID NITROGEN (COSMETIC)
Render Post-Care Instructions In Note?: no
Show Spray Paint Technique Variable?: Yes
Post-Care Instructions: I reviewed with the patient in detail post-care instructions. Patient is to wear sunprotection, and avoid picking at any of the treated lesions. Pt may apply Vaseline to crusted or scabbing areas.
Consent: The patient's consent was obtained including but not limited to risks of crusting, scabbing, blistering, scarring, darker or lighter pigmentary change, recurrence, incomplete removal and infection. The patient understands that the procedure is cosmetic in nature and is not covered by insurance.
Detail Level: Simple
Billing Information: Bill by Static Price
Price (Use Numbers Only, No Special Characters Or $): 0
Spray Paint Text: The liquid nitrogen was applied to the skin utilizing a spray paint frosting technique.

## 2024-10-18 NOTE — PROCEDURE: ELECTRODESICCATION (COSMETIC)
Consent: The patient's consent was obtained including but not limited to risks of crusting, scabbing, blistering, scarring, darker or lighter pigmentary change, recurrence, incomplete removal and infection.
Detail Level: Zone
Braun: 0.8
Anesthesia Volume In Cc: 0
Post-Care Instructions: I reviewed with the patient in detail post-care instructions. Patient is to wear sunprotection, and avoid picking at any of the treated lesions. Pt may apply Vaseline to crusted or scabbing areas
Price (Use Numbers Only, No Special Characters Or $): 240

## 2024-11-29 DIAGNOSIS — E11.9 TYPE 2 DIABETES MELLITUS WITHOUT COMPLICATION, WITHOUT LONG-TERM CURRENT USE OF INSULIN (HCC): ICD-10-CM

## 2024-11-29 DIAGNOSIS — E66.01 MORBID OBESITY (HCC): ICD-10-CM

## 2024-11-30 NOTE — TELEPHONE ENCOUNTER
Received request via: Pharmacy    Was the patient seen in the last year in this department? Yes  LOV : 8/26/2024  Does the patient have an active prescription (recently filled or refills available) for medication(s) requested? No    Pharmacy Name: [facility]     Does the patient have senior living Plus and need 100-day supply? (This applies to ALL medications) Patient does not have SCP

## 2024-12-23 ENCOUNTER — HOSPITAL ENCOUNTER (OUTPATIENT)
Dept: RADIOLOGY | Facility: MEDICAL CENTER | Age: 54
End: 2024-12-23
Attending: INTERNAL MEDICINE
Payer: COMMERCIAL

## 2024-12-23 DIAGNOSIS — Z12.31 ENCOUNTER FOR SCREENING MAMMOGRAM FOR MALIGNANT NEOPLASM OF BREAST: ICD-10-CM

## 2024-12-23 PROCEDURE — 77067 SCR MAMMO BI INCL CAD: CPT

## 2024-12-27 ENCOUNTER — OFFICE VISIT (OUTPATIENT)
Dept: MEDICAL GROUP | Facility: PHYSICIAN GROUP | Age: 54
End: 2024-12-27
Payer: COMMERCIAL

## 2024-12-27 VITALS — WEIGHT: 279 LBS | HEIGHT: 64 IN | RESPIRATION RATE: 14 BRPM | BODY MASS INDEX: 47.63 KG/M2

## 2024-12-27 DIAGNOSIS — E66.813 CLASS 3 SEVERE OBESITY DUE TO EXCESS CALORIES WITHOUT SERIOUS COMORBIDITY WITH BODY MASS INDEX (BMI) OF 50.0 TO 59.9 IN ADULT (HCC): ICD-10-CM

## 2024-12-27 DIAGNOSIS — E11.9 TYPE 2 DIABETES MELLITUS WITHOUT COMPLICATION, WITHOUT LONG-TERM CURRENT USE OF INSULIN (HCC): ICD-10-CM

## 2024-12-27 DIAGNOSIS — I10 ESSENTIAL HYPERTENSION: ICD-10-CM

## 2024-12-27 DIAGNOSIS — E66.01 CLASS 3 SEVERE OBESITY DUE TO EXCESS CALORIES WITHOUT SERIOUS COMORBIDITY WITH BODY MASS INDEX (BMI) OF 50.0 TO 59.9 IN ADULT (HCC): ICD-10-CM

## 2024-12-27 LAB
HBA1C MFR BLD: 9.4 % (ref ?–5.8)
POCT INT CON NEG: NEGATIVE
POCT INT CON POS: POSITIVE

## 2024-12-27 PROCEDURE — 99404 PREV MED CNSL INDIV APPRX 60: CPT

## 2024-12-27 PROCEDURE — 83036 HEMOGLOBIN GLYCOSYLATED A1C: CPT

## 2024-12-27 RX ORDER — TIRZEPATIDE 2.5 MG/.5ML
2.5 INJECTION, SOLUTION SUBCUTANEOUS
Qty: 2 ML | Refills: 2 | Status: SHIPPED | OUTPATIENT
Start: 2024-12-27

## 2024-12-27 NOTE — PROGRESS NOTES
Patient Consult Note    TIME IN: 9:25am  TIME OUT: 10:22am    Primary care physician: Manuel Barbosa D.O.    Reason for Consult: Management of Uncontrolled Type 2 Diabetes    Date Referral Placed: 9/23/24    HPI:  Logan Mahoney is a 54 y.o. old patient who comes in today for evaluation of above stated problem.    Allergies  Cefaclor, Cinnamon, Eggs, Latex, Losartan, Peppers [pepper-bell food allergy], and Pineapple    Current Diabetes Medication Regimen  Metformin ER: 1000 mg BID    Previous Diabetes Medications and Reason for Discontinuation  - Ozempic: migraines   - Trulicity: unable to obtain due to backorder  - Jardiance: yeast infection/itching    Potential Barriers to Care:  Adherence: denies missed doses  Side effects: Reports GI upset  Affordability: No issues at this time    SMBG  Pt has home glucometer and proper testing technique - Yes    Pt reports blood sugars:   Before Breakfast:     Hypoglycemia  Hypoglycemia awareness: Yes  Nocturnal hypoglycemia: None  Hypoglycemia:  None  Pt's treatment of Hypoglycemia  Discussed 15:15 Rule    Lifestyle  Current Exercise - Pt reports ~50 lbs over last 6 months, has been exercising 4 days/week (going with ).   Strength training and exercise bike (30 minutes 2-3 days/week)    Dietary - Intermittent fasting approach 10am-1pm  Breakfast - typically skips  Lunch - protein with veggie and a small carb and fruit  Dinner - protein with veggie and small carb (typically no fruit)  Snacks - berries/apples, veggies w/ dip, and occasional chips   Drinks - coffee w/ heavy cream and small amount of sugar, water w/ crystal lite or lemon, or diet soda    Labs  Lab Results   Component Value Date/Time    HBA1C 9.4 (A) 12/27/2024 09:58 AM    HBA1C 13.5 (A) 08/26/2024 08:24 AM    HBA1C 7.2 (H) 09/09/2023 07:37 AM      Lab Results   Component Value Date/Time    SODIUM 141 06/17/2022 07:24 AM    POTASSIUM 4.7 06/17/2022 07:24 AM    CHLORIDE 105 06/17/2022 07:24  "AM    CO2 24 06/17/2022 07:24 AM    GLUCOSE 112 (H) 06/17/2022 07:24 AM    BUN 19 06/17/2022 07:24 AM    CREATININE 0.71 06/17/2022 07:24 AM     Lab Results   Component Value Date/Time    ALKPHOSPHAT 80 06/17/2022 07:24 AM    ASTSGOT 32 06/17/2022 07:24 AM    ALTSGPT 32 06/17/2022 07:24 AM    TBILIRUBIN 0.3 06/17/2022 07:24 AM    ALBUMIN 4.0 06/17/2022 07:24 AM      Lab Results   Component Value Date/Time    CHOLSTRLTOT 215 (H) 03/04/2022 07:33 AM     (H) 03/04/2022 07:33 AM    HDL 38 (A) 03/04/2022 07:33 AM    TRIGLYCERIDE 185 (H) 03/04/2022 07:33 AM       Lab Results   Component Value Date/Time    MALBCRT see below 04/15/2022 08:23 AM    MICROALBUR <1.2 04/15/2022 08:23 AM       Physical Examination:  Vital signs: Resp 14   Ht 1.626 m (5' 4\")   Wt (!) 127 kg (279 lb)   LMP 03/01/2016   BMI 47.89 kg/m²  Body mass index is 47.89 kg/m².    Assessment and Plan:    1. DM2  Basic physiology of DMII was explained to patient as well as microvascular/macrovascular complications. The importance of increasing physical activity to improve diabetes control was discussed with the patient. Patient was also educated on changing diet and making better choices to help control blood sugar.  Discussed Goals: FBG 80 - 130, 2hPP < 180, a1c < 7.0%  Last a1c drawn on 12/27/24 was 9.4%, which is not at goal and has improved since the previous reading  Pt reports FBG values which are largely within goal, therefore would expect PPBG values to be significantly elevated.   Patient report significant weight loss (~50 lbs) over previous 6 months which she attributes to changes in diet and exercise. She did increase metformin dosing from 500 mg BID to 1000 mg BID ~2 months ago. She states that she has been experiencing GI upset consistently ever since initiating increased dosing which has not improved/resolved since then. She does report improvement in symptoms when she takes total daily dose of 1500 mg instead of 2000 mg.  Patient " states that her main goal is to avoid initiating insulin for as long as possible at this time. She reports experiencing significant stress as a result of her mother's recent leukemia diagnosis which has also caused her diet to be less strict along with the holiday season.   Pt reports history of severe yeast infection when started on Jardiance 10 mg in the past, therefore would not re-trial SGLT2-I therapy at this time; however, may consider QOD Farxiga 5mg or even 2.5 mg in the future if necessary.  Pt has been on Ozempic (d/c'ed d/t migraines) and Trulicity (d/c'ed d/t supply chain issues) in the past.    - Medication changes:  Metformin: Reduce dose to 500 mg QAM and 1000 mg QPM to decrease GI side effects.  START Mounjaro 2.5 mg SQ weekly  Discussed MOA, SE, and strategies to mitigate SE such as reducing portion sizes, more frequent meals, and maintaining consistent daily hydration.   - Continue:  N/A     - Lifestyle changes:  Exercise Goal - Increase as tolerated  Goal of exercise bike for 30 minutes consistently 3 days/week. End goal of at least 150 minutes of aerobic exercise each week.   Dietary Goal - limit carbohydrate consumption and increase lean proteins and veggies.   Discussed the plate method which patient is already following.     - Preventative management:  REC DM Score: 5  Care gaps addressed:   A1c is above 8%: Optimized DM medications/management  Lipid panel is due: Lipid panel ordered by PCP.  Blood pressure is due: Unable to discuss; will discuss at next visit  Microalbumin:creatine is due: Unable to discuss; will discuss at next visit  Retinal exam due: Patient completed retinal exam on 6/2024 (need results to be abstracted)  Care gaps updated in Health Maintenance    Follow Up:  5 weeks to f/u regarding initiation of Mounjaro and potential up-titration of dose.     Ronald Mendoza, PharmD    CC:   Manuel Barbosa D.O.

## 2025-01-31 ENCOUNTER — OFFICE VISIT (OUTPATIENT)
Dept: MEDICAL GROUP | Facility: PHYSICIAN GROUP | Age: 55
End: 2025-01-31
Payer: COMMERCIAL

## 2025-01-31 DIAGNOSIS — E11.9 TYPE 2 DIABETES MELLITUS WITHOUT COMPLICATION, WITHOUT LONG-TERM CURRENT USE OF INSULIN (HCC): ICD-10-CM

## 2025-01-31 RX ORDER — BLOOD SUGAR DIAGNOSTIC
STRIP MISCELLANEOUS
Qty: 300 STRIP | Refills: 6 | Status: SHIPPED | OUTPATIENT
Start: 2025-01-31

## 2025-01-31 NOTE — PROGRESS NOTES
Patient Consult Note    TIME IN: 9:29am  TIME OUT: 9:49am    Primary care physician: Manuel Barbosa D.O.    Reason for Consult: Management of Uncontrolled Type 2 Diabetes    Date Referral Placed: 9/23/24    HPI:  Logan Mahoney is a 54 y.o. old patient who comes in today for evaluation of above stated problem.    Allergies  Cefaclor, Cinnamon, Eggs, Latex, Losartan, Peppers [pepper-bell food allergy], and Pineapple    Current Diabetes Medication Regimen  Metformin ER: 1000 mg BID  Mounjaro 2.5mg SQ once weekly - only one dose given at this time    Previous Diabetes Medications and Reason for Discontinuation  - Ozempic: migraines   - Trulicity: unable to obtain due to backorder  - Jardiance: yeast infection/itching    Potential Barriers to Care:  Adherence: denies missed doses  Side effects: Reports GI upset  Affordability: No issues at this time    SMBG  Pt has home glucometer and proper testing technique - Yes    Pt reports blood sugars:   Before Breakfast: 102 to mid 120s    Hypoglycemia  Hypoglycemia awareness: Yes  Nocturnal hypoglycemia: None  Hypoglycemia:  None  Pt's treatment of Hypoglycemia  Discussed 15:15 Rule    Lifestyle  Has been dealing with a couple illnesses over the last few weeks, which has altered appetite and overall nutrition habits.  States that portions have been under good control and carb count low.  Continues with regular exercise, possibly a bit less these last couple weeks d/t not feeling well.    Previous visit notes:  12/2024  Current Exercise - Pt reports ~50 lbs over last 6 months, has been exercising 4 days/week (going with ).   Strength training and exercise bike (30 minutes 2-3 days/week)    Dietary - Intermittent fasting approach 10am-1pm  Breakfast - typically skips  Lunch - protein with veggie and a small carb and fruit  Dinner - protein with veggie and small carb (typically no fruit)  Snacks - berries/apples, veggies w/ dip, and occasional chips   Drinks -  coffee w/ heavy cream and small amount of sugar, water w/ crystal lite or lemon, or diet soda    Labs  Lab Results   Component Value Date/Time    HBA1C 9.4 (A) 12/27/2024 09:58 AM    HBA1C 13.5 (A) 08/26/2024 08:24 AM    HBA1C 7.2 (H) 09/09/2023 07:37 AM      Lab Results   Component Value Date/Time    SODIUM 141 06/17/2022 07:24 AM    POTASSIUM 4.7 06/17/2022 07:24 AM    CHLORIDE 105 06/17/2022 07:24 AM    CO2 24 06/17/2022 07:24 AM    GLUCOSE 112 (H) 06/17/2022 07:24 AM    BUN 19 06/17/2022 07:24 AM    CREATININE 0.71 06/17/2022 07:24 AM     Lab Results   Component Value Date/Time    ALKPHOSPHAT 80 06/17/2022 07:24 AM    ASTSGOT 32 06/17/2022 07:24 AM    ALTSGPT 32 06/17/2022 07:24 AM    TBILIRUBIN 0.3 06/17/2022 07:24 AM    ALBUMIN 4.0 06/17/2022 07:24 AM      Lab Results   Component Value Date/Time    CHOLSTRLTOT 215 (H) 03/04/2022 07:33 AM     (H) 03/04/2022 07:33 AM    HDL 38 (A) 03/04/2022 07:33 AM    TRIGLYCERIDE 185 (H) 03/04/2022 07:33 AM       Lab Results   Component Value Date/Time    MALBCRT see below 04/15/2022 08:23 AM    MICROALBUR <1.2 04/15/2022 08:23 AM       Physical Examination:  Vital signs: LMP 03/01/2016  There is no height or weight on file to calculate BMI.    Assessment and Plan:    1. DM2  Basic physiology of DMII was explained to patient as well as microvascular/macrovascular complications. The importance of increasing physical activity to improve diabetes control was discussed with the patient. Patient was also educated on changing diet and making better choices to help control blood sugar.  Discussed Goals: FBG 80 - 130, 2hPP < 180, a1c < 7.0%  Last a1c drawn on 12/27/24 was 9.4%, which is above goal, not due for repeat at this time  Home FBG readings seem to be at goal, though patient would like to see a bit lower.  Patient report significant weight loss (~50 lbs) over previous 6 months which she attributes to changes in diet and exercise.   Patient states that her main goal is  to avoid initiating insulin for as long as possible at this time. She reports experiencing significant stress as a result of her mother's recent leukemia diagnosis which has also caused her diet to be less strict along with the holiday season.   Patient continues to report GI distress with metformin dosing, but also feels it is difficult to determine with recent GI illness.  Has only dosed one injection of Mounjaro as she did not wish to initiate while ill and did not administer first injection properly (lost most of the medication as she flinched during injection).    - Medication changes:  DECREASE Metformin to 500mg BID  Continue Mounjaro 2.5mg SQ once weekly for three total doses, then will increase to 5mg dose if tolerated.   - Continue:  N/A     - Lifestyle changes:  Exercise Goal - Increase as tolerated  Goal of exercise bike for 30 minutes consistently 3 days/week. End goal of at least 150 minutes of aerobic exercise each week.   Dietary Goal - limit carbohydrate consumption and increase lean proteins and veggies.   Discussed the plate method which patient is already following.     - Preventative management:  REC DM Score: 5  Care gaps addressed:   A1c is above 8%: Optimized DM medications/management  Lipid panel is due: Lipid panel ordered by PCP.  Blood pressure is due: Unable to discuss; will discuss at next visit  Microalbumin:creatine is due: Unable to discuss; will discuss at next visit  Retinal exam due: Patient completed retinal exam on 6/2024 (need results to be abstracted)  Care gaps updated in Health Maintenance    Follow Up:  Five weeks.    Kade Godoy, PharmD  1/31/2025    CC:   Manuel Barbosa D.O.

## 2025-02-14 RX ORDER — TIRZEPATIDE 5 MG/.5ML
1 INJECTION, SOLUTION SUBCUTANEOUS
Qty: 2 ML | Refills: 0 | Status: SHIPPED | OUTPATIENT
Start: 2025-02-14

## 2025-03-07 ENCOUNTER — OFFICE VISIT (OUTPATIENT)
Dept: MEDICAL GROUP | Facility: PHYSICIAN GROUP | Age: 55
End: 2025-03-07
Payer: COMMERCIAL

## 2025-03-07 DIAGNOSIS — E11.9 TYPE 2 DIABETES MELLITUS WITHOUT COMPLICATION, WITHOUT LONG-TERM CURRENT USE OF INSULIN (HCC): ICD-10-CM

## 2025-03-07 PROCEDURE — 99401 PREV MED CNSL INDIV APPRX 15: CPT | Performed by: PHARMACIST

## 2025-03-07 NOTE — PROGRESS NOTES
Patient Consult Note - Follow Up Visit    TIME IN: 8:28am  TIME OUT: 8:44am    Primary care physician: Manuel Barbosa D.O.    Reason for Consult: Management of Uncontrolled Type 2 Diabetes    Date Referral Placed: 24    HPI:  Logan Mahoney is a 54 y.o. old patient who comes in today for evaluation of above stated problem.    Allergies  Cefaclor, Cinnamon, Eggs, Latex, Losartan, Peppers [pepper-bell food allergy], and Pineapple    Current Diabetes Medication Regimen  Metformin ER: 500 mg BID  Mounjaro 5mg SQ once weekly    Previous Diabetes Medications and Reason for Discontinuation  - Ozempic: migraines   - Trulicity: unable to obtain due to backorder  - Jardiance: yeast infection/itching    Potential Barriers to Care:  Adherence: denies missed doses  Side effects: Reports GI upset  Affordability: No issues at this time    SMBG  Pt has home glucometer and proper testing technique - Yes    Pt reports blood sugars:   Before Breakfast: monitor broke couple weeks back, now using her mothers monitor, but test strips are , sent new order for her    Hypoglycemia  Hypoglycemia awareness: Yes  Nocturnal hypoglycemia: None  Hypoglycemia:  None  Pt's treatment of Hypoglycemia  Discussed 15:15 Rule    Lifestyle  Noted appetite suppression with increased dose of Mounjaro.  She feels her portions have gotten smaller, early satiety at most meals.  Continues to exercise on regular basis, trying to achieve at least 150-180 min/week.    Previous visit notes:  2025  Has been dealing with a couple illnesses over the last few weeks, which has altered appetite and overall nutrition habits.  States that portions have been under good control and carb count low.  Continues with regular exercise, possibly a bit less these last couple weeks d/t not feeling well.    2024  Current Exercise - Pt reports ~50 lbs over last 6 months, has been exercising 4 days/week (going with ).   Strength training and exercise  bike (30 minutes 2-3 days/week)    Dietary - Intermittent fasting approach 10am-1pm  Breakfast - typically skips  Lunch - protein with veggie and a small carb and fruit  Dinner - protein with veggie and small carb (typically no fruit)  Snacks - berries/apples, veggies w/ dip, and occasional chips   Drinks - coffee w/ heavy cream and small amount of sugar, water w/ crystal lite or lemon, or diet soda    Labs  Lab Results   Component Value Date/Time    HBA1C 9.4 (A) 12/27/2024 09:58 AM    HBA1C 13.5 (A) 08/26/2024 08:24 AM    HBA1C 7.2 (H) 09/09/2023 07:37 AM      Lab Results   Component Value Date/Time    SODIUM 141 06/17/2022 07:24 AM    POTASSIUM 4.7 06/17/2022 07:24 AM    CHLORIDE 105 06/17/2022 07:24 AM    CO2 24 06/17/2022 07:24 AM    GLUCOSE 112 (H) 06/17/2022 07:24 AM    BUN 19 06/17/2022 07:24 AM    CREATININE 0.71 06/17/2022 07:24 AM     Lab Results   Component Value Date/Time    ALKPHOSPHAT 80 06/17/2022 07:24 AM    ASTSGOT 32 06/17/2022 07:24 AM    ALTSGPT 32 06/17/2022 07:24 AM    TBILIRUBIN 0.3 06/17/2022 07:24 AM    ALBUMIN 4.0 06/17/2022 07:24 AM      Lab Results   Component Value Date/Time    CHOLSTRLTOT 215 (H) 03/04/2022 07:33 AM     (H) 03/04/2022 07:33 AM    HDL 38 (A) 03/04/2022 07:33 AM    TRIGLYCERIDE 185 (H) 03/04/2022 07:33 AM       Lab Results   Component Value Date/Time    MALBCRT see below 04/15/2022 08:23 AM    MICROALBUR <1.2 04/15/2022 08:23 AM       Physical Examination:  Vital signs: LMP 03/01/2016  There is no height or weight on file to calculate BMI.    Assessment and Plan:    1. DM2  Basic physiology of DMII was explained to patient as well as microvascular/macrovascular complications. The importance of increasing physical activity to improve diabetes control was discussed with the patient. Patient was also educated on changing diet and making better choices to help control blood sugar.  Discussed Goals: FBG 80 - 130, 2hPP < 180, a1c < 7.0%  Last a1c drawn on 12/27/24 was  9.4%, which is above goal, not due for repeat at this time  Unable to assess FBG as her glucometer broke a few weeks back, has new monitor in place, but will need updated order of test strips.  Patient report significant weight loss (~50 lbs) over previous 6 months which she attributes to changes in diet and exercise.   Patient states that her main goal is to avoid initiating insulin for as long as possible at this time. She reports experiencing significant stress as a result of her mother's recent leukemia diagnosis which has also caused her diet to be less strict along with the holiday season.   Tolerating dose increase of Mounjaro and notes appetite suppression and early satiety with most meals.    - Medication changes:  INCREASE Mounjaro to 7.5mg SQ once weekly  Continue Metformin 500mg BID  - Continue:  N/A     - Lifestyle changes:  Exercise Goal - Increase as tolerated  Goal of exercise bike for 30 minutes consistently 3 days/week. End goal of at least 150-180 minutes of aerobic exercise each week.   Dietary Goal - limit carbohydrate consumption and increase lean proteins and veggies.   Discussed the plate method which patient is already following.     - Preventative management:  REC DM Score: 5  Care gaps addressed:   A1c is above 8%: Optimized DM medications/management  Lipid panel is due: Lipid panel ordered by PCP.  Would highly benefit from statin therapy, will discuss further at next visit once updated labs completed.  Blood pressure >140/90:  unable to assess as machine malfunctioning today.  Microalbumin:creatine is due: Unable to discuss; will discuss at next visit  Retinal exam due: Faxed [facility] for records  Care gaps updated in Health Maintenance    Follow Up:  Six weeks.    Kade Godoy, PharmD, BCACP  03/07/2025    CC:   Manuel Barbosa D.O.

## 2025-04-08 RX ORDER — TIRZEPATIDE 10 MG/.5ML
1 INJECTION, SOLUTION SUBCUTANEOUS
Qty: 2 ML | Refills: 1 | Status: SHIPPED | OUTPATIENT
Start: 2025-04-08 | End: 2025-04-25

## 2025-04-12 ENCOUNTER — HOSPITAL ENCOUNTER (OUTPATIENT)
Dept: LAB | Facility: MEDICAL CENTER | Age: 55
End: 2025-04-12
Attending: INTERNAL MEDICINE
Payer: COMMERCIAL

## 2025-04-12 DIAGNOSIS — E11.9 TYPE 2 DIABETES MELLITUS WITHOUT COMPLICATION, UNSPECIFIED WHETHER LONG TERM INSULIN USE (HCC): ICD-10-CM

## 2025-04-12 LAB
ALBUMIN SERPL BCP-MCNC: 4 G/DL (ref 3.2–4.9)
ALBUMIN/GLOB SERPL: 1.1 G/DL
ALP SERPL-CCNC: 74 U/L (ref 30–99)
ALT SERPL-CCNC: 66 U/L (ref 2–50)
ANION GAP SERPL CALC-SCNC: 11 MMOL/L (ref 7–16)
AST SERPL-CCNC: 57 U/L (ref 12–45)
BASOPHILS # BLD AUTO: 0.8 % (ref 0–1.8)
BASOPHILS # BLD: 0.08 K/UL (ref 0–0.12)
BILIRUB SERPL-MCNC: 0.2 MG/DL (ref 0.1–1.5)
BUN SERPL-MCNC: 10 MG/DL (ref 8–22)
CALCIUM ALBUM COR SERPL-MCNC: 9.5 MG/DL (ref 8.5–10.5)
CALCIUM SERPL-MCNC: 9.5 MG/DL (ref 8.5–10.5)
CHLORIDE SERPL-SCNC: 103 MMOL/L (ref 96–112)
CHOLEST SERPL-MCNC: 204 MG/DL (ref 100–199)
CO2 SERPL-SCNC: 24 MMOL/L (ref 20–33)
CREAT SERPL-MCNC: 0.82 MG/DL (ref 0.5–1.4)
EOSINOPHIL # BLD AUTO: 0.27 K/UL (ref 0–0.51)
EOSINOPHIL NFR BLD: 2.6 % (ref 0–6.9)
ERYTHROCYTE [DISTWIDTH] IN BLOOD BY AUTOMATED COUNT: 43.8 FL (ref 35.9–50)
EST. AVERAGE GLUCOSE BLD GHB EST-MCNC: 252 MG/DL
GFR SERPLBLD CREATININE-BSD FMLA CKD-EPI: 85 ML/MIN/1.73 M 2
GLOBULIN SER CALC-MCNC: 3.5 G/DL (ref 1.9–3.5)
GLUCOSE SERPL-MCNC: 192 MG/DL (ref 65–99)
HBA1C MFR BLD: 10.4 % (ref 4–5.6)
HCT VFR BLD AUTO: 42.6 % (ref 37–47)
HDLC SERPL-MCNC: 44 MG/DL
HGB BLD-MCNC: 14.1 G/DL (ref 12–16)
IMM GRANULOCYTES # BLD AUTO: 0.05 K/UL (ref 0–0.11)
IMM GRANULOCYTES NFR BLD AUTO: 0.5 % (ref 0–0.9)
LDLC SERPL CALC-MCNC: 130 MG/DL
LYMPHOCYTES # BLD AUTO: 2.96 K/UL (ref 1–4.8)
LYMPHOCYTES NFR BLD: 28.4 % (ref 22–41)
MCH RBC QN AUTO: 30.9 PG (ref 27–33)
MCHC RBC AUTO-ENTMCNC: 33.1 G/DL (ref 32.2–35.5)
MCV RBC AUTO: 93.2 FL (ref 81.4–97.8)
MONOCYTES # BLD AUTO: 0.7 K/UL (ref 0–0.85)
MONOCYTES NFR BLD AUTO: 6.7 % (ref 0–13.4)
NEUTROPHILS # BLD AUTO: 6.37 K/UL (ref 1.82–7.42)
NEUTROPHILS NFR BLD: 61 % (ref 44–72)
NRBC # BLD AUTO: 0 K/UL
NRBC BLD-RTO: 0 /100 WBC (ref 0–0.2)
PLATELET # BLD AUTO: 265 K/UL (ref 164–446)
PMV BLD AUTO: 10.9 FL (ref 9–12.9)
POTASSIUM SERPL-SCNC: 4.7 MMOL/L (ref 3.6–5.5)
PROT SERPL-MCNC: 7.5 G/DL (ref 6–8.2)
RBC # BLD AUTO: 4.57 M/UL (ref 4.2–5.4)
SODIUM SERPL-SCNC: 138 MMOL/L (ref 135–145)
TRIGL SERPL-MCNC: 149 MG/DL (ref 0–149)
WBC # BLD AUTO: 10.4 K/UL (ref 4.8–10.8)

## 2025-04-12 PROCEDURE — 80053 COMPREHEN METABOLIC PANEL: CPT

## 2025-04-12 PROCEDURE — 83036 HEMOGLOBIN GLYCOSYLATED A1C: CPT

## 2025-04-12 PROCEDURE — 36415 COLL VENOUS BLD VENIPUNCTURE: CPT

## 2025-04-12 PROCEDURE — 85025 COMPLETE CBC W/AUTO DIFF WBC: CPT

## 2025-04-12 PROCEDURE — 80061 LIPID PANEL: CPT

## 2025-04-18 ENCOUNTER — OFFICE VISIT (OUTPATIENT)
Dept: MEDICAL GROUP | Facility: PHYSICIAN GROUP | Age: 55
End: 2025-04-18
Payer: COMMERCIAL

## 2025-04-18 VITALS — HEIGHT: 64 IN | HEART RATE: 88 BPM | OXYGEN SATURATION: 98 % | BODY MASS INDEX: 47.87 KG/M2 | RESPIRATION RATE: 14 BRPM

## 2025-04-18 DIAGNOSIS — E11.9 TYPE 2 DIABETES MELLITUS WITHOUT COMPLICATION, WITHOUT LONG-TERM CURRENT USE OF INSULIN (HCC): ICD-10-CM

## 2025-04-18 PROCEDURE — 99401 PREV MED CNSL INDIV APPRX 15: CPT | Performed by: PHARMACIST

## 2025-04-18 RX ORDER — PIOGLITAZONE 30 MG/1
30 TABLET ORAL DAILY
Qty: 100 TABLET | Refills: 3 | Status: SHIPPED | OUTPATIENT
Start: 2025-04-18 | End: 2026-05-23

## 2025-04-18 NOTE — PROGRESS NOTES
Patient Consult Note - Follow Up Visit    TIME IN: 8:14am  TIME OUT: 8:32am    Primary care physician: Manuel Barbosa D.O.    Reason for Consult: Management of Uncontrolled Type 2 Diabetes    Date Referral Placed: 9/23/24    HPI:  Logan Mahoney is a 54 y.o. old patient who comes in today for evaluation of above stated problem.    Reliable and Exact Care Diabetes Score    Displays the Diabetes REC Score.  A patient gets 1 point for each measure for a maximum of 7 points   5  Measures Not Met      Factor Value   REC DM SCORE - Most recent BP is within the last year and below 140/90 Not Met   - Systolic  (8/26/2024)   - Diastolic BP 80 (8/26/2024)   REC DM SCORE - Most recent HgbA1c is below 8 Not Met   - Hemoglobin A1c 10.4 (4/12/2025)   REC DM SCORE - Patient is not overdue for a Retinal Exam Not Met   Last Eye Exam Date 7/25/2022   REC DM SCORE - LDL has been performed in the last year and is below 100 Not Met   - LDL calculated 130 (4/12/2025)   REC DM SCORE - Microalbumin : Creatinine Ratio has been performed in the last year Not Met             Allergies  Cefaclor, Cinnamon, Eggs, Latex, Losartan, Peppers [pepper-bell food allergy], and Pineapple    Current Diabetes Medication Regimen  Metformin ER: 500 mg BID  Mounjaro 10mg SQ once weekly    Previous Diabetes Medications and Reason for Discontinuation  - Ozempic: migraines   - Trulicity: unable to obtain due to backorder  - Jardiance: yeast infection/itching    Potential Barriers to Care:  Adherence: denies missed doses  Side effects: Reports GI upset  Affordability: No issues at this time    SMBG  Pt has home glucometer and proper testing technique - Yes    Pt reports blood sugars:   Before Breakfast:  195, 204, 205, 215, 196, 201, 215, 169, 210, 212, 169, 174, 189, 239, 194, 192, 209, 184, 205, 232, 198, 202, 157    Hypoglycemia  Hypoglycemia awareness: Yes  Nocturnal hypoglycemia: None  Hypoglycemia:  None  Pt's treatment of  Hypoglycemia  Discussed 15:15 Rule    Lifestyle  Continued appetite suppression.  Decrease in cravings for carb heavy foods and snacks.  Continues to exercise on regular basis, currently three days of resistance and one day of cardio.    Previous visit notes:  3/2025  Noted appetite suppression with increased dose of Mounjaro.  She feels her portions have gotten smaller, early satiety at most meals.  Continues to exercise on regular basis, trying to achieve at least 150-180 min/week.    1/2025  Has been dealing with a couple illnesses over the last few weeks, which has altered appetite and overall nutrition habits.  States that portions have been under good control and carb count low.  Continues with regular exercise, possibly a bit less these last couple weeks d/t not feeling well.    12/2024  Current Exercise - Pt reports ~50 lbs over last 6 months, has been exercising 4 days/week (going with ).   Strength training and exercise bike (30 minutes 2-3 days/week)    Dietary - Intermittent fasting approach 10am-1pm  Breakfast - typically skips  Lunch - protein with veggie and a small carb and fruit  Dinner - protein with veggie and small carb (typically no fruit)  Snacks - berries/apples, veggies w/ dip, and occasional chips   Drinks - coffee w/ heavy cream and small amount of sugar, water w/ crystal lite or lemon, or diet soda    Labs  Lab Results   Component Value Date/Time    HBA1C 10.4 (H) 04/12/2025 08:05 AM    HBA1C 9.4 (A) 12/27/2024 09:58 AM    HBA1C 13.5 (A) 08/26/2024 08:24 AM      Lab Results   Component Value Date/Time    SODIUM 138 04/12/2025 08:05 AM    POTASSIUM 4.7 04/12/2025 08:05 AM    CHLORIDE 103 04/12/2025 08:05 AM    CO2 24 04/12/2025 08:05 AM    GLUCOSE 192 (H) 04/12/2025 08:05 AM    BUN 10 04/12/2025 08:05 AM    CREATININE 0.82 04/12/2025 08:05 AM     Lab Results   Component Value Date/Time    ALKPHOSPHAT 74 04/12/2025 08:05 AM    ASTSGOT 57 (H) 04/12/2025 08:05 AM    ALTSGPT 66 (H)  "04/12/2025 08:05 AM    TBILIRUBIN 0.2 04/12/2025 08:05 AM    ALBUMIN 4.0 04/12/2025 08:05 AM      Lab Results   Component Value Date/Time    CHOLSTRLTOT 204 (H) 04/12/2025 08:05 AM     (H) 04/12/2025 08:05 AM    HDL 44 04/12/2025 08:05 AM    TRIGLYCERIDE 149 04/12/2025 08:05 AM       Lab Results   Component Value Date/Time    MALBCRT see below 04/15/2022 08:23 AM    MICROALBUR <1.2 04/15/2022 08:23 AM       Physical Examination:  Vital signs: Pulse 88   Resp 14   Ht 1.626 m (5' 4.02\")   LMP 03/01/2016   SpO2 98%   BMI 47.87 kg/m²  Body mass index is 47.87 kg/m².    Assessment and Plan:    1. DM2  Basic physiology of DMII was explained to patient as well as microvascular/macrovascular complications. The importance of increasing physical activity to improve diabetes control was discussed with the patient. Patient was also educated on changing diet and making better choices to help control blood sugar.  Discussed Goals: FBG 80 - 130, 2hPP < 180, a1c < 7.0%  A1c drawn this week, continued to worsen to 10.4%, previously 9.4% 1/2025.  Has duy back to testing FBG on regular basis, seem to be improving with each Mounjaro dose increase, but still well above goal.  She continues to note appetite suppression and early satiety with most meals.  Endorses a decrease in cravings for carb based foods and sweets overall as well.   Patient states that her main goal is to avoid initiating insulin for as long as possible at this time. She reports experiencing significant stress as a result of her mother's recent leukemia diagnosis.   With A1c worsening, did discuss implementing insulin at today's visit.  Through shared decision making, decided to continue with Mounjaro titration, add pioglitazone, and plan to add insulin at time of next A1c if not significantly close to goal.    - Medication changes:  INCREASE Mounjaro to 12.5mg SQ once weekly  START pioglitazone 30mg QD    - Continue:  Continue metformin ER 500mg BID    - " Lifestyle changes:  Exercise Goal - Increase as tolerated  Goal of exercise - 3 days cardio via bike, 3 days resistance training on average.  End goal of at least 150-180 minutes of aerobic exercise each week.   Dietary Goal - limit carbohydrate consumption and increase lean proteins and veggies.   Discussed the plate method which patient is already following.     - Preventative management:  REC DM Score: 5  Care gaps addressed:   A1c is above 8%: Optimized DM medications/management  Lipid panel is due: Lipid panel ordered by PCP.  Would highly benefit from statin therapy, will plan to implement at next visit, want to  tolerability of pioglitazone first  Blood pressure >140/90:  unable to assess as machine malfunctioning today.  Microalbumin:creatine is due: ordered today  Retinal exam due: Faxed [facility] for records  Care gaps updated in Health Maintenance    Follow Up:  Six weeks.    Kade Godoy, PharmD, BCACP  03/07/2025    CC:   Manuel Barbosa D.O.

## 2025-04-25 RX ORDER — TIRZEPATIDE 12.5 MG/.5ML
1 INJECTION, SOLUTION SUBCUTANEOUS
Qty: 2 ML | Refills: 1 | Status: SHIPPED | OUTPATIENT
Start: 2025-04-25

## 2025-04-29 DIAGNOSIS — I10 HYPERTENSION, UNSPECIFIED TYPE: ICD-10-CM

## 2025-04-29 RX ORDER — LOSARTAN POTASSIUM 50 MG/1
50 TABLET ORAL DAILY
Qty: 90 TABLET | Refills: 3 | Status: SHIPPED | OUTPATIENT
Start: 2025-04-29

## 2025-05-02 DIAGNOSIS — I10 HYPERTENSION, UNSPECIFIED TYPE: ICD-10-CM

## 2025-05-02 RX ORDER — LOSARTAN POTASSIUM 50 MG/1
50 TABLET ORAL DAILY
Qty: 90 TABLET | Refills: 3 | Status: SHIPPED | OUTPATIENT
Start: 2025-05-02

## 2025-05-22 ENCOUNTER — APPOINTMENT (OUTPATIENT)
Dept: LAB | Facility: MEDICAL CENTER | Age: 55
End: 2025-05-22
Payer: COMMERCIAL

## 2025-05-22 ENCOUNTER — HOSPITAL ENCOUNTER (OUTPATIENT)
Facility: MEDICAL CENTER | Age: 55
End: 2025-05-22
Attending: INTERNAL MEDICINE
Payer: COMMERCIAL

## 2025-05-22 DIAGNOSIS — E11.9 TYPE 2 DIABETES MELLITUS WITHOUT COMPLICATION, WITHOUT LONG-TERM CURRENT USE OF INSULIN (HCC): ICD-10-CM

## 2025-05-22 LAB
CREAT UR-MCNC: 34.2 MG/DL
MICROALBUMIN UR-MCNC: <1.2 MG/DL
MICROALBUMIN/CREAT UR: NORMAL MG/G (ref 0–30)

## 2025-05-22 PROCEDURE — 82570 ASSAY OF URINE CREATININE: CPT

## 2025-05-22 PROCEDURE — 82043 UR ALBUMIN QUANTITATIVE: CPT

## 2025-05-30 ENCOUNTER — OFFICE VISIT (OUTPATIENT)
Dept: MEDICAL GROUP | Facility: PHYSICIAN GROUP | Age: 55
End: 2025-05-30
Payer: COMMERCIAL

## 2025-05-30 VITALS — BODY MASS INDEX: 47.87 KG/M2 | OXYGEN SATURATION: 98 % | RESPIRATION RATE: 14 BRPM | HEIGHT: 64 IN | HEART RATE: 81 BPM

## 2025-05-30 DIAGNOSIS — E11.9 TYPE 2 DIABETES MELLITUS WITHOUT COMPLICATION, WITHOUT LONG-TERM CURRENT USE OF INSULIN (HCC): Primary | ICD-10-CM

## 2025-05-30 DIAGNOSIS — E78.5 DYSLIPIDEMIA: ICD-10-CM

## 2025-05-30 RX ORDER — ROSUVASTATIN CALCIUM 5 MG/1
5 TABLET, COATED ORAL EVERY EVENING
Qty: 100 TABLET | Refills: 3 | Status: SHIPPED | OUTPATIENT
Start: 2025-05-30 | End: 2026-07-04

## 2025-05-30 NOTE — PROGRESS NOTES
Patient Consult Note - Follow Up Visit    TIME IN: 3:28pm  TIME OUT: 3:49pm    Primary care physician: Manuel Barbosa D.O.    Reason for Consult: Management of Uncontrolled Type 2 Diabetes    Date Referral Placed: 9/23/24    HPI:  Logan Mahoney is a 54 y.o. old patient who comes in today for evaluation of above stated problem.    Reliable and Exact Care Diabetes Score    Displays the Diabetes REC Score.  A patient gets 1 point for each measure for a maximum of 7 points   4  Measures Not Met      Factor Value   REC DM SCORE - Most recent BP is within the last year and below 140/90 Not Met   - Systolic  (8/26/2024)   - Diastolic BP 80 (8/26/2024)   REC DM SCORE - Most recent HgbA1c is below 8 Not Met   - Hemoglobin A1c 10.4 (4/12/2025)   REC DM SCORE - Patient is not overdue for a Retinal Exam Not Met   Last Eye Exam Date 7/25/2022   REC DM SCORE - LDL has been performed in the last year and is below 100 Not Met   - LDL calculated 130 (4/12/2025)             Allergies  Cefaclor, Cinnamon, Eggs, Latex, Losartan, Peppers [pepper-bell food allergy], and Pineapple    Current Diabetes Medication Regimen  Metformin ER: 500 mg BID  Mounjaro 12.5mg SQ once weekly    Previous Diabetes Medications and Reason for Discontinuation  - Ozempic: migraines   - Trulicity: unable to obtain due to backorder  - Jardiance: yeast infection/itching    Potential Barriers to Care:  Adherence: denies missed doses  Side effects: Reports GI upset  Affordability: No issues at this time    SMBG  Pt has home glucometer and proper testing technique - Yes    Pt reports blood sugars:   Before Breakfast:  low 100s to 140    Hypoglycemia  Hypoglycemia awareness: Yes  Nocturnal hypoglycemia: None  Hypoglycemia:  None  Pt's treatment of Hypoglycemia  Discussed 15:15 Rule    Lifestyle  Continued appetite suppression.  Decrease in cravings for carb heavy foods and snacks.  Continues to exercise on regular basis, currently three days of  resistance and three days of cardio.    Previous visit notes:  3/2025  Noted appetite suppression with increased dose of Mounjaro.  She feels her portions have gotten smaller, early satiety at most meals.  Continues to exercise on regular basis, trying to achieve at least 150-180 min/week.    1/2025  Has been dealing with a couple illnesses over the last few weeks, which has altered appetite and overall nutrition habits.  States that portions have been under good control and carb count low.  Continues with regular exercise, possibly a bit less these last couple weeks d/t not feeling well.    12/2024  Current Exercise - Pt reports ~50 lbs over last 6 months, has been exercising 4 days/week (going with ).   Strength training and exercise bike (30 minutes 2-3 days/week)    Dietary - Intermittent fasting approach 10am-1pm  Breakfast - typically skips  Lunch - protein with veggie and a small carb and fruit  Dinner - protein with veggie and small carb (typically no fruit)  Snacks - berries/apples, veggies w/ dip, and occasional chips   Drinks - coffee w/ heavy cream and small amount of sugar, water w/ crystal lite or lemon, or diet soda    Labs  Lab Results   Component Value Date/Time    HBA1C 10.4 (H) 04/12/2025 08:05 AM    HBA1C 9.4 (A) 12/27/2024 09:58 AM    HBA1C 13.5 (A) 08/26/2024 08:24 AM      Lab Results   Component Value Date/Time    SODIUM 138 04/12/2025 08:05 AM    POTASSIUM 4.7 04/12/2025 08:05 AM    CHLORIDE 103 04/12/2025 08:05 AM    CO2 24 04/12/2025 08:05 AM    GLUCOSE 192 (H) 04/12/2025 08:05 AM    BUN 10 04/12/2025 08:05 AM    CREATININE 0.82 04/12/2025 08:05 AM     Lab Results   Component Value Date/Time    ALKPHOSPHAT 74 04/12/2025 08:05 AM    ASTSGOT 57 (H) 04/12/2025 08:05 AM    ALTSGPT 66 (H) 04/12/2025 08:05 AM    TBILIRUBIN 0.2 04/12/2025 08:05 AM    ALBUMIN 4.0 04/12/2025 08:05 AM      Lab Results   Component Value Date/Time    CHOLSTRLTOT 204 (H) 04/12/2025 08:05 AM     (H)  "04/12/2025 08:05 AM    HDL 44 04/12/2025 08:05 AM    TRIGLYCERIDE 149 04/12/2025 08:05 AM       Lab Results   Component Value Date/Time    MALBCRT see below 05/22/2025 03:40 PM    MICROALBUR <1.2 05/22/2025 03:40 PM       Physical Examination:  Vital signs: Pulse 81   Resp 14   Ht 1.626 m (5' 4.02\")   LMP 03/01/2016   SpO2 98%   BMI 47.87 kg/m²  Body mass index is 47.87 kg/m².    Assessment and Plan:    1. DM2  Basic physiology of DMII was explained to patient as well as microvascular/macrovascular complications. The importance of increasing physical activity to improve diabetes control was discussed with the patient. Patient was also educated on changing diet and making better choices to help control blood sugar.  Discussed Goals: FBG 80 - 130, 2hPP < 180, a1c < 7.0%  Last A1c drawn was well above goal at 10.4% (4/2025), not due for repeat at this time.  Home FBG seem to have improved quite a bit recently, not sure there is correlation with discontinuation of metformin.  She continues to note appetite suppression and early satiety with most meals.  Endorses a decrease in cravings for carb based foods and sweets overall as well.   Patient states that her main goal is to avoid initiating insulin for as long as possible at this time. She is highly motivated to sustain lifestyle changes as well.     - Medication changes:  STOP metformin per patient request    - Continue:  Mounjaro 12.5mg SQ once weekly  Pioglitazone 30mg QD    - Lifestyle changes:  Exercise Goal - Increase as tolerated  Goal of exercise - 3 days cardio via bike, 3 days resistance training on average.  End goal of at least 150-180 minutes of aerobic exercise each week.   Dietary Goal - limit carbohydrate consumption and increase lean proteins and veggies.   Discussed the plate method which patient is already following.     - Preventative management:  REC DM Score: 4  Care gaps addressed:   A1c is above 8%: Optimized DM medications/management  LDL-C " >100: START Rosuvastatin 5mg QD  Blood pressure >140/90:  unable to assess as machine malfunctioning today.  Retinal exam due: Faxed [facility] for records  Care gaps updated in Health Maintenance    Follow Up:  Eight weeks    Kade Godoy, PharmD, BCACP  05/30/2025    CC:   Manuel Barbosa D.O.

## 2025-06-25 ENCOUNTER — HOSPITAL ENCOUNTER (OUTPATIENT)
Facility: MEDICAL CENTER | Age: 55
End: 2025-06-25
Attending: OBSTETRICS & GYNECOLOGY
Payer: COMMERCIAL

## 2025-06-25 LAB
HCV AB SER QL: NORMAL
HIV 1+2 AB+HIV1 P24 AG SERPL QL IA: NORMAL
T PALLIDUM AB SER QL IA: NORMAL

## 2025-06-25 PROCEDURE — 87389 HIV-1 AG W/HIV-1&-2 AB AG IA: CPT

## 2025-06-25 PROCEDURE — 86803 HEPATITIS C AB TEST: CPT

## 2025-06-25 PROCEDURE — 86780 TREPONEMA PALLIDUM: CPT

## 2025-06-25 PROCEDURE — 87350 HEPATITIS BE AG IA: CPT

## 2025-06-26 NOTE — TELEPHONE ENCOUNTER
Received request via: Patient    Was the patient seen in the last year in this department? Yes  LOV 11/11/2021  Does the patient have an active prescription (recently filled or refills available) for medication(s) requested? No   (3) no apparent problem

## 2025-06-27 LAB — HBV E AG SERPL QL IA: NEGATIVE

## 2025-07-03 ENCOUNTER — APPOINTMENT (OUTPATIENT)
Dept: URBAN - METROPOLITAN AREA CLINIC 22 | Facility: CLINIC | Age: 55
Setting detail: DERMATOLOGY
End: 2025-07-03

## 2025-07-03 DIAGNOSIS — L82.0 INFLAMED SEBORRHEIC KERATOSIS: ICD-10-CM

## 2025-07-03 DIAGNOSIS — D22 MELANOCYTIC NEVI: ICD-10-CM

## 2025-07-03 DIAGNOSIS — Z85.828 PERSONAL HISTORY OF OTHER MALIGNANT NEOPLASM OF SKIN: ICD-10-CM

## 2025-07-03 DIAGNOSIS — Z71.89 OTHER SPECIFIED COUNSELING: ICD-10-CM

## 2025-07-03 DIAGNOSIS — L81.4 OTHER MELANIN HYPERPIGMENTATION: ICD-10-CM

## 2025-07-03 DIAGNOSIS — L82.1 OTHER SEBORRHEIC KERATOSIS: ICD-10-CM

## 2025-07-03 DIAGNOSIS — D18.0 HEMANGIOMA: ICD-10-CM

## 2025-07-03 PROBLEM — D18.01 HEMANGIOMA OF SKIN AND SUBCUTANEOUS TISSUE: Status: ACTIVE | Noted: 2025-07-03

## 2025-07-03 PROBLEM — D22.5 MELANOCYTIC NEVI OF TRUNK: Status: ACTIVE | Noted: 2025-07-03

## 2025-07-03 PROCEDURE — ? SUNSCREEN RECOMMENDATIONS

## 2025-07-03 PROCEDURE — ? COUNSELING

## 2025-07-03 PROCEDURE — ? LIQUID NITROGEN

## 2025-07-03 ASSESSMENT — LOCATION DETAILED DESCRIPTION DERM
LOCATION DETAILED: RIGHT PROXIMAL MEDIAL CALF
LOCATION DETAILED: EPIGASTRIC SKIN
LOCATION DETAILED: LEFT SUPERIOR MEDIAL UPPER BACK
LOCATION DETAILED: NASAL ROOT
LOCATION DETAILED: RIGHT SUPERIOR MEDIAL MIDBACK
LOCATION DETAILED: RIGHT RIB CAGE
LOCATION DETAILED: RIGHT ANTERIOR PROXIMAL THIGH

## 2025-07-03 ASSESSMENT — LOCATION ZONE DERM
LOCATION ZONE: TRUNK
LOCATION ZONE: NOSE
LOCATION ZONE: LEG

## 2025-07-03 ASSESSMENT — LOCATION SIMPLE DESCRIPTION DERM
LOCATION SIMPLE: RIGHT CALF
LOCATION SIMPLE: NOSE
LOCATION SIMPLE: RIGHT THIGH
LOCATION SIMPLE: LEFT UPPER BACK
LOCATION SIMPLE: ABDOMEN
LOCATION SIMPLE: RIGHT LOWER BACK

## 2025-07-11 ENCOUNTER — APPOINTMENT (OUTPATIENT)
Dept: URBAN - METROPOLITAN AREA CLINIC 20 | Facility: CLINIC | Age: 55
Setting detail: DERMATOLOGY
End: 2025-07-11

## 2025-07-11 DIAGNOSIS — L81.4 OTHER MELANIN HYPERPIGMENTATION: ICD-10-CM

## 2025-07-11 DIAGNOSIS — L73.8 OTHER SPECIFIED FOLLICULAR DISORDERS: ICD-10-CM

## 2025-07-11 PROCEDURE — ? LIQUID NITROGEN (COSMETIC)

## 2025-07-11 PROCEDURE — ? ELECTRODESICCATION (COSMETIC)

## 2025-07-11 ASSESSMENT — LOCATION DETAILED DESCRIPTION DERM
LOCATION DETAILED: RIGHT MEDIAL MALAR CHEEK
LOCATION DETAILED: RIGHT SUPERIOR MEDIAL BUCCAL CHEEK
LOCATION DETAILED: RIGHT INFERIOR FOREHEAD
LOCATION DETAILED: RIGHT INFERIOR MEDIAL FOREHEAD
LOCATION DETAILED: RIGHT CENTRAL MALAR CHEEK
LOCATION DETAILED: LEFT INFERIOR CENTRAL MALAR CHEEK
LOCATION DETAILED: RIGHT INFERIOR LATERAL MALAR CHEEK
LOCATION DETAILED: LEFT SUPERIOR CENTRAL MALAR CHEEK
LOCATION DETAILED: RIGHT INFERIOR LATERAL FOREHEAD
LOCATION DETAILED: LEFT MEDIAL FOREHEAD
LOCATION DETAILED: LEFT LATERAL FOREHEAD
LOCATION DETAILED: RIGHT LATERAL FOREHEAD
LOCATION DETAILED: LEFT SUPERIOR CENTRAL BUCCAL CHEEK

## 2025-07-11 ASSESSMENT — LOCATION SIMPLE DESCRIPTION DERM
LOCATION SIMPLE: RIGHT FOREHEAD
LOCATION SIMPLE: LEFT FOREHEAD
LOCATION SIMPLE: RIGHT CHEEK
LOCATION SIMPLE: LEFT CHEEK

## 2025-07-11 ASSESSMENT — LOCATION ZONE DERM: LOCATION ZONE: FACE

## 2025-07-11 NOTE — PROCEDURE: ELECTRODESICCATION (COSMETIC)
Post-Care Instructions: I reviewed with the patient in detail post-care instructions. Patient is to wear sunprotection, and avoid picking at any of the treated lesions. Pt may apply Vaseline to crusted or scabbing areas
Braun: 0.7
Consent: The patient's consent was obtained including but not limited to risks of crusting, scabbing, blistering, scarring, darker or lighter pigmentary change, recurrence, incomplete removal and infection.
Detail Level: Zone
Anesthesia Volume In Cc: 0
Price (Use Numbers Only, No Special Characters Or $): 160

## 2025-07-11 NOTE — PROCEDURE: LIQUID NITROGEN (COSMETIC)
Spray Paint Text: The liquid nitrogen was applied to the skin utilizing a spray paint frosting technique.
Spray Paint Technique: No
Detail Level: Simple
Show Spray Paint Technique Variable?: Yes
Price (Use Numbers Only, No Special Characters Or $): 0
Post-Care Instructions: I reviewed with the patient in detail post-care instructions. Patient is to wear sunprotection, and avoid picking at any of the treated lesions. Pt may apply Vaseline to crusted or scabbing areas.
Billing Information: Bill by Static Price
Consent: The patient's consent was obtained including but not limited to risks of crusting, scabbing, blistering, scarring, darker or lighter pigmentary change, recurrence, incomplete removal and infection. The patient understands that the procedure is cosmetic in nature and is not covered by insurance.

## 2025-07-22 ENCOUNTER — HOSPITAL ENCOUNTER (OUTPATIENT)
Dept: LAB | Facility: MEDICAL CENTER | Age: 55
End: 2025-07-22
Attending: INTERNAL MEDICINE
Payer: COMMERCIAL

## 2025-07-22 ENCOUNTER — APPOINTMENT (OUTPATIENT)
Dept: LAB | Facility: MEDICAL CENTER | Age: 55
End: 2025-07-22
Payer: COMMERCIAL

## 2025-07-22 DIAGNOSIS — E11.9 TYPE 2 DIABETES MELLITUS WITHOUT COMPLICATION, WITHOUT LONG-TERM CURRENT USE OF INSULIN (HCC): ICD-10-CM

## 2025-07-22 DIAGNOSIS — E78.5 DYSLIPIDEMIA: ICD-10-CM

## 2025-07-22 LAB
ALBUMIN SERPL BCP-MCNC: 4 G/DL (ref 3.2–4.9)
ALBUMIN/GLOB SERPL: 1.2 G/DL
ALP SERPL-CCNC: 62 U/L (ref 30–99)
ALT SERPL-CCNC: 20 U/L (ref 2–50)
ANION GAP SERPL CALC-SCNC: 9 MMOL/L (ref 7–16)
AST SERPL-CCNC: 25 U/L (ref 12–45)
BILIRUB SERPL-MCNC: 0.2 MG/DL (ref 0.1–1.5)
BUN SERPL-MCNC: 18 MG/DL (ref 8–22)
CALCIUM ALBUM COR SERPL-MCNC: 9.8 MG/DL (ref 8.5–10.5)
CALCIUM SERPL-MCNC: 9.8 MG/DL (ref 8.5–10.5)
CHLORIDE SERPL-SCNC: 106 MMOL/L (ref 96–112)
CHOLEST SERPL-MCNC: 256 MG/DL (ref 100–199)
CO2 SERPL-SCNC: 25 MMOL/L (ref 20–33)
CREAT SERPL-MCNC: 0.99 MG/DL (ref 0.5–1.4)
EST. AVERAGE GLUCOSE BLD GHB EST-MCNC: 120 MG/DL
GFR SERPLBLD CREATININE-BSD FMLA CKD-EPI: 68 ML/MIN/1.73 M 2
GLOBULIN SER CALC-MCNC: 3.4 G/DL (ref 1.9–3.5)
GLUCOSE SERPL-MCNC: 108 MG/DL (ref 65–99)
HBA1C MFR BLD: 5.8 % (ref 4–5.6)
HDLC SERPL-MCNC: 51 MG/DL
LDLC SERPL CALC-MCNC: 185 MG/DL
POTASSIUM SERPL-SCNC: 5.4 MMOL/L (ref 3.6–5.5)
PROT SERPL-MCNC: 7.4 G/DL (ref 6–8.2)
SODIUM SERPL-SCNC: 140 MMOL/L (ref 135–145)
TRIGL SERPL-MCNC: 101 MG/DL (ref 0–149)

## 2025-07-22 PROCEDURE — 80061 LIPID PANEL: CPT

## 2025-07-22 PROCEDURE — 36415 COLL VENOUS BLD VENIPUNCTURE: CPT

## 2025-07-22 PROCEDURE — 83036 HEMOGLOBIN GLYCOSYLATED A1C: CPT

## 2025-07-22 PROCEDURE — 80053 COMPREHEN METABOLIC PANEL: CPT

## 2025-07-22 PROCEDURE — 82172 ASSAY OF APOLIPOPROTEIN: CPT

## 2025-07-25 ENCOUNTER — OFFICE VISIT (OUTPATIENT)
Dept: MEDICAL GROUP | Facility: PHYSICIAN GROUP | Age: 55
End: 2025-07-25
Payer: COMMERCIAL

## 2025-07-25 VITALS
HEIGHT: 64 IN | HEART RATE: 79 BPM | BODY MASS INDEX: 47.87 KG/M2 | DIASTOLIC BLOOD PRESSURE: 90 MMHG | SYSTOLIC BLOOD PRESSURE: 123 MMHG | RESPIRATION RATE: 15 BRPM

## 2025-07-25 DIAGNOSIS — E11.9 TYPE 2 DIABETES MELLITUS WITHOUT COMPLICATION, WITHOUT LONG-TERM CURRENT USE OF INSULIN (HCC): Primary | ICD-10-CM

## 2025-07-25 LAB — APO B100 SERPL-MCNC: 135 MG/DL (ref 60–117)

## 2025-07-25 PROCEDURE — 3074F SYST BP LT 130 MM HG: CPT | Performed by: FAMILY MEDICINE

## 2025-07-25 PROCEDURE — 3080F DIAST BP >= 90 MM HG: CPT | Performed by: FAMILY MEDICINE

## 2025-07-25 PROCEDURE — 99401 PREV MED CNSL INDIV APPRX 15: CPT | Performed by: FAMILY MEDICINE

## 2025-07-25 RX ORDER — TIRZEPATIDE 12.5 MG/.5ML
1 INJECTION, SOLUTION SUBCUTANEOUS
Qty: 2 ML | Refills: 4 | Status: SHIPPED | OUTPATIENT
Start: 2025-07-25

## 2025-07-25 RX ORDER — ROSUVASTATIN CALCIUM 10 MG/1
10 TABLET, COATED ORAL EVERY EVENING
Qty: 100 TABLET | Refills: 3 | Status: SHIPPED | OUTPATIENT
Start: 2025-07-25 | End: 2026-08-29

## 2025-07-25 NOTE — PROGRESS NOTES
Patient Consult Note - Follow Up Visit    TIME IN: 9:57am  TIME OUT: 10:15am    Primary care physician: Manuel Barbosa D.O.    Reason for Consult: Management of Uncontrolled Type 2 Diabetes    Date Referral Placed: 9/23/24    HPI:  Logan Mahoney is a 54 y.o. old patient who comes in today for evaluation of above stated problem.    Reliable and Exact Care Diabetes Score    Displays the Diabetes REC Score.  A patient gets 1 point for each measure for a maximum of 7 points   3  Measures Not Met      Factor Value   REC DM SCORE - Most recent BP is within the last year and below 140/90 Not Met   - Systolic  (8/26/2024)   - Diastolic BP 80 (8/26/2024)   REC DM SCORE - Patient is not overdue for a Retinal Exam Not Met   REC DM SCORE - LDL has been performed in the last year and is below 100 Not Met   - LDL calculated 185 (7/22/2025)               Allergies  Cefaclor, Cinnamon, Eggs, Latex, Losartan, Peppers [pepper-bell food allergy], and Pineapple    Current Diabetes Medication Regimen  Metformin ER: 500 mg BID  Mounjaro 12.5mg SQ once weekly    Previous Diabetes Medications and Reason for Discontinuation  - Ozempic: migraines   - Trulicity: unable to obtain due to backorder  - Jardiance: yeast infection/itching    Potential Barriers to Care:  Adherence: denies missed doses  Side effects: Reports GI upset  Affordability: No issues at this time    SMBG  Pt has home glucometer and proper testing technique - Yes    Pt reports blood sugars:   Before Breakfast:  low 100s to 140    Hypoglycemia  Hypoglycemia awareness: Yes  Nocturnal hypoglycemia: None  Hypoglycemia:  None  Pt's treatment of Hypoglycemia  Discussed 15:15 Rule    Lifestyle  Continued appetite suppression.  High stress levels since last visit with several personal matters taking place.  Decrease in cravings for carb heavy foods and snacks.  Continues to exercise on regular basis, currently three days of resistance and three days of  cardio.    Previous visit notes:  3/2025  Noted appetite suppression with increased dose of Mounjaro.  She feels her portions have gotten smaller, early satiety at most meals.  Continues to exercise on regular basis, trying to achieve at least 150-180 min/week.    1/2025  Has been dealing with a couple illnesses over the last few weeks, which has altered appetite and overall nutrition habits.  States that portions have been under good control and carb count low.  Continues with regular exercise, possibly a bit less these last couple weeks d/t not feeling well.    12/2024  Current Exercise - Pt reports ~50 lbs over last 6 months, has been exercising 4 days/week (going with ).   Strength training and exercise bike (30 minutes 2-3 days/week)    Dietary - Intermittent fasting approach 10am-1pm  Breakfast - typically skips  Lunch - protein with veggie and a small carb and fruit  Dinner - protein with veggie and small carb (typically no fruit)  Snacks - berries/apples, veggies w/ dip, and occasional chips   Drinks - coffee w/ heavy cream and small amount of sugar, water w/ crystal lite or lemon, or diet soda    Labs  Lab Results   Component Value Date/Time    HBA1C 5.8 (H) 07/22/2025 10:08 AM    HBA1C 10.4 (H) 04/12/2025 08:05 AM    HBA1C 9.4 (A) 12/27/2024 09:58 AM      Lab Results   Component Value Date/Time    SODIUM 140 07/22/2025 10:08 AM    POTASSIUM 5.4 07/22/2025 10:08 AM    CHLORIDE 106 07/22/2025 10:08 AM    CO2 25 07/22/2025 10:08 AM    GLUCOSE 108 (H) 07/22/2025 10:08 AM    BUN 18 07/22/2025 10:08 AM    CREATININE 0.99 07/22/2025 10:08 AM     Lab Results   Component Value Date/Time    ALKPHOSPHAT 62 07/22/2025 10:08 AM    ASTSGOT 25 07/22/2025 10:08 AM    ALTSGPT 20 07/22/2025 10:08 AM    TBILIRUBIN 0.2 07/22/2025 10:08 AM    ALBUMIN 4.0 07/22/2025 10:08 AM      Lab Results   Component Value Date/Time    CHOLSTRLTOT 256 (H) 07/22/2025 10:08 AM     (H) 07/22/2025 10:08 AM    HDL 51 07/22/2025  "10:08 AM    TRIGLYCERIDE 101 07/22/2025 10:08 AM       Lab Results   Component Value Date/Time    MALBCRT see below 05/22/2025 03:40 PM    MICROALBUR <1.2 05/22/2025 03:40 PM       Physical Examination:  Vital signs: BP (!) 123/90 (BP Location: Right arm, Patient Position: Sitting, BP Cuff Size: Large adult)   Pulse 79   Resp 15   Ht 1.626 m (5' 4.02\")   LMP 03/01/2016   BMI 47.87 kg/m²  Body mass index is 47.87 kg/m².    Assessment and Plan:    1. DM2  Basic physiology of DMII was explained to patient as well as microvascular/macrovascular complications. The importance of increasing physical activity to improve diabetes control was discussed with the patient. Patient was also educated on changing diet and making better choices to help control blood sugar.  Discussed Goals: FBG 80 - 130, 2hPP < 180, a1c < 7.0%  Last A1c drawn was well within goal at 5.8% (7/22/2025), which is much improved from previous value of 10.4%.  Home FBG still at goal, corresponding to current A1c.  She continues to note appetite suppression and early satiety with most meals.    Several life stresses at home over the past couple months  She misplaced rosuvastatin, states that she has likely NOT dosed for the past couple months, which correlates with increase in LDL-C.     - Medication changes:  RESTART rosuvastatin at 10mg QD.    - Continue:  Mounjaro 12.5mg SQ once weekly - may increase at next visit.  Pioglitazone 30mg QD    - Lifestyle changes:  Exercise Goal - Increase as tolerated  Goal of exercise - 3 days cardio via bike, 3 days resistance training on average.  End goal of at least 150-180 minutes of aerobic exercise each week.   Dietary Goal - limit carbohydrate consumption and increase lean proteins and veggies.   Discussed the plate method which patient is already following.     - Preventative management:  REC DM Score: 4  Care gaps addressed:   LDL-C >100: see above  Blood pressure >140/90:  measured today, within goal " now.  Retinal exam due: Appt with optometry on 8-14-25  Care gaps updated in Health Maintenance    Follow Up:  Three months for A1c    Kade Godoy, PharmD, BCACP  07/25/2025    CC:   Manuel Barbosa D.O.

## 2025-08-23 SDOH — ECONOMIC STABILITY: FOOD INSECURITY: WITHIN THE PAST 12 MONTHS, THE FOOD YOU BOUGHT JUST DIDN'T LAST AND YOU DIDN'T HAVE MONEY TO GET MORE.: NEVER TRUE

## 2025-08-23 SDOH — ECONOMIC STABILITY: TRANSPORTATION INSECURITY
IN THE PAST 12 MONTHS, HAS THE LACK OF TRANSPORTATION KEPT YOU FROM MEDICAL APPOINTMENTS OR FROM GETTING MEDICATIONS?: NO

## 2025-08-23 SDOH — ECONOMIC STABILITY: INCOME INSECURITY: HOW HARD IS IT FOR YOU TO PAY FOR THE VERY BASICS LIKE FOOD, HOUSING, MEDICAL CARE, AND HEATING?: NOT HARD AT ALL

## 2025-08-23 SDOH — HEALTH STABILITY: PHYSICAL HEALTH: ON AVERAGE, HOW MANY DAYS PER WEEK DO YOU ENGAGE IN MODERATE TO STRENUOUS EXERCISE (LIKE A BRISK WALK)?: 5 DAYS

## 2025-08-23 SDOH — HEALTH STABILITY: PHYSICAL HEALTH: ON AVERAGE, HOW MANY MINUTES DO YOU ENGAGE IN EXERCISE AT THIS LEVEL?: 40 MIN

## 2025-08-23 SDOH — ECONOMIC STABILITY: INCOME INSECURITY: IN THE LAST 12 MONTHS, WAS THERE A TIME WHEN YOU WERE NOT ABLE TO PAY THE MORTGAGE OR RENT ON TIME?: NO

## 2025-08-23 SDOH — ECONOMIC STABILITY: FOOD INSECURITY: WITHIN THE PAST 12 MONTHS, YOU WORRIED THAT YOUR FOOD WOULD RUN OUT BEFORE YOU GOT MONEY TO BUY MORE.: NEVER TRUE

## 2025-08-23 SDOH — ECONOMIC STABILITY: TRANSPORTATION INSECURITY
IN THE PAST 12 MONTHS, HAS LACK OF RELIABLE TRANSPORTATION KEPT YOU FROM MEDICAL APPOINTMENTS, MEETINGS, WORK OR FROM GETTING THINGS NEEDED FOR DAILY LIVING?: NO

## 2025-08-23 SDOH — ECONOMIC STABILITY: HOUSING INSECURITY
IN THE LAST 12 MONTHS, WAS THERE A TIME WHEN YOU DID NOT HAVE A STEADY PLACE TO SLEEP OR SLEPT IN A SHELTER (INCLUDING NOW)?: NO

## 2025-08-23 SDOH — ECONOMIC STABILITY: TRANSPORTATION INSECURITY
IN THE PAST 12 MONTHS, HAS LACK OF TRANSPORTATION KEPT YOU FROM MEETINGS, WORK, OR FROM GETTING THINGS NEEDED FOR DAILY LIVING?: NO

## 2025-08-23 SDOH — HEALTH STABILITY: MENTAL HEALTH
STRESS IS WHEN SOMEONE FEELS TENSE, NERVOUS, ANXIOUS, OR CAN'T SLEEP AT NIGHT BECAUSE THEIR MIND IS TROUBLED. HOW STRESSED ARE YOU?: ONLY A LITTLE

## 2025-08-23 ASSESSMENT — SOCIAL DETERMINANTS OF HEALTH (SDOH)
DO YOU BELONG TO ANY CLUBS OR ORGANIZATIONS SUCH AS CHURCH GROUPS UNIONS, FRATERNAL OR ATHLETIC GROUPS, OR SCHOOL GROUPS?: YES
HOW OFTEN DO YOU HAVE A DRINK CONTAINING ALCOHOL: NEVER
HOW OFTEN DO YOU ATTEND CHURCH OR RELIGIOUS SERVICES?: MORE THAN 4 TIMES PER YEAR
DO YOU BELONG TO ANY CLUBS OR ORGANIZATIONS SUCH AS CHURCH GROUPS UNIONS, FRATERNAL OR ATHLETIC GROUPS, OR SCHOOL GROUPS?: YES
IN A TYPICAL WEEK, HOW MANY TIMES DO YOU TALK ON THE PHONE WITH FAMILY, FRIENDS, OR NEIGHBORS?: MORE THAN THREE TIMES A WEEK
HOW OFTEN DO YOU GET TOGETHER WITH FRIENDS OR RELATIVES?: MORE THAN THREE TIMES A WEEK
HOW HARD IS IT FOR YOU TO PAY FOR THE VERY BASICS LIKE FOOD, HOUSING, MEDICAL CARE, AND HEATING?: NOT HARD AT ALL
HOW MANY DRINKS CONTAINING ALCOHOL DO YOU HAVE ON A TYPICAL DAY WHEN YOU ARE DRINKING: PATIENT DOES NOT DRINK
HOW OFTEN DO YOU HAVE SIX OR MORE DRINKS ON ONE OCCASION: NEVER
HOW OFTEN DO YOU GET TOGETHER WITH FRIENDS OR RELATIVES?: MORE THAN THREE TIMES A WEEK
WITHIN THE PAST 12 MONTHS, YOU WORRIED THAT YOUR FOOD WOULD RUN OUT BEFORE YOU GOT THE MONEY TO BUY MORE: NEVER TRUE
IN A TYPICAL WEEK, HOW MANY TIMES DO YOU TALK ON THE PHONE WITH FAMILY, FRIENDS, OR NEIGHBORS?: MORE THAN THREE TIMES A WEEK
IN THE PAST 12 MONTHS, HAS THE ELECTRIC, GAS, OIL, OR WATER COMPANY THREATENED TO SHUT OFF SERVICE IN YOUR HOME?: NO
HOW OFTEN DO YOU ATTEND CHURCH OR RELIGIOUS SERVICES?: MORE THAN 4 TIMES PER YEAR
HOW OFTEN DO YOU ATTENT MEETINGS OF THE CLUB OR ORGANIZATION YOU BELONG TO?: MORE THAN 4 TIMES PER YEAR
HOW OFTEN DO YOU ATTENT MEETINGS OF THE CLUB OR ORGANIZATION YOU BELONG TO?: MORE THAN 4 TIMES PER YEAR

## 2025-08-23 ASSESSMENT — LIFESTYLE VARIABLES
SKIP TO QUESTIONS 9-10: 1
HOW OFTEN DO YOU HAVE A DRINK CONTAINING ALCOHOL: NEVER
AUDIT-C TOTAL SCORE: 0
HOW OFTEN DO YOU HAVE SIX OR MORE DRINKS ON ONE OCCASION: NEVER
HOW MANY STANDARD DRINKS CONTAINING ALCOHOL DO YOU HAVE ON A TYPICAL DAY: PATIENT DOES NOT DRINK

## 2025-08-26 ENCOUNTER — APPOINTMENT (OUTPATIENT)
Dept: MEDICAL GROUP | Facility: LAB | Age: 55
End: 2025-08-26
Payer: COMMERCIAL

## 2025-08-26 ASSESSMENT — FIBROSIS 4 INDEX: FIB4 SCORE: 1.118033988749894848

## (undated) DEVICE — CHLORAPREP 26 ML APPLICATOR - ORANGE TINT(25/CA)

## (undated) DEVICE — PROTECTOR ULNA NERVE - (36PR/CA)

## (undated) DEVICE — GLOVE BIOGEL ECLIPSE PF LATEX SIZE 9.0

## (undated) DEVICE — TUBING CLEARLINK DUO-VENT - C-FLO (48EA/CA)

## (undated) DEVICE — CANNULA W/SEAL 5X100 Z-THRE - ADED KII (12/BX)

## (undated) DEVICE — NEEDLE INSFL 120MM 14GA VRRS - (20/BX)

## (undated) DEVICE — TROCAR 5X100 NON BLADED Z-TH - READ KII (6/BX)

## (undated) DEVICE — DRESSING TRANSPARENT FILM TEGADERM 4 X 4.75" (50EA/BX)"

## (undated) DEVICE — HEAD HOLDER JUNIOR/ADULT

## (undated) DEVICE — GOWN WARMING STANDARD FLEX - (30/CA)

## (undated) DEVICE — PACK MINOR BASIN - (2EA/CA)

## (undated) DEVICE — MASK ANESTHESIA ADULT  - (100/CA)

## (undated) DEVICE — SET EXTENSION WITH 2 PORTS (48EA/CA) ***PART #2C8610 IS A SUBSTITUTE*****

## (undated) DEVICE — GLOVE BIOGEL SZ 6 PF LATEX - (50EA/BX 4BX/CA)

## (undated) DEVICE — DRAPE LAPAROTOMY T SHEET - (12EA/CA)

## (undated) DEVICE — GLOVE BIOGEL INDICATOR SZ 7SURGICAL PF LTX - (50/BX 4BX/CA)

## (undated) DEVICE — SUCTION INSTRUMENT YANKAUER BULBOUS TIP W/O VENT (50EA/CA)

## (undated) DEVICE — TUBING INSUFFLATION - (10/BX)

## (undated) DEVICE — KIT ROOM DECONTAMINATION

## (undated) DEVICE — SUTURE GENERAL

## (undated) DEVICE — SYRINGE 20 ML LS (48/BX 4BX/CA)

## (undated) DEVICE — TUBE E-T HI-LO CUFF 7.5MM (10EA/PK)

## (undated) DEVICE — SYRINGE 10 ML CONTROL LL (25EA/BX 4BX/CA)

## (undated) DEVICE — GLOVE BIOGEL ECLIPSE  PF LATEX SIZE 6.5 (50PR/BX)

## (undated) DEVICE — BLADE SURGICAL #11 - (50/BX)

## (undated) DEVICE — BAG, SPONGE COUNT 50600

## (undated) DEVICE — KIT ANESTHESIA W/CIRCUIT & 3/LT BAG W/FILTER (20EA/CA)

## (undated) DEVICE — ELECTRODE 5MM LHK LAPSCP STERILE DISP- MEGADYNE  (5/CA)

## (undated) DEVICE — TOWELS CLOTH SURGICAL - (4/PK 20PK/CA)

## (undated) DEVICE — LACTATED RINGERS INJ 1000 ML - (14EA/CA 60CA/PF)

## (undated) DEVICE — NEEDLE NON SAFETY 25 GA X 1 1/2 IN HYPO (100EA/BX)

## (undated) DEVICE — BOVIE BLADE COATED - (50/PK)

## (undated) DEVICE — DRESSING TRANSPARENT FILM TEGADERM 2.375 X 2.75"  (100EA/BX)"

## (undated) DEVICE — CORDS BIPOLAR COAGULATION - 12FT STERILE DISP. (10EA/BX)

## (undated) DEVICE — CANISTER SUCTION 3000ML MECHANICAL FILTER AUTO SHUTOFF MEDI-VAC NONSTERILE LF DISP  (40EA/CA)

## (undated) DEVICE — NEPTUNE 4 PORT MANIFOLD - (20/PK)

## (undated) DEVICE — ELECTRODE DUAL RETURN W/ CORD - (50/PK)

## (undated) DEVICE — ENDOSTITCH LOAD UNIT 0 SURGI - 12/CA

## (undated) DEVICE — GLOVE BIOGEL M SZ 8 SURGICAL PF LTX - (50/BX 4BX/CA)

## (undated) DEVICE — SODIUM CHL IRRIGATION 0.9% 1000ML (12EA/CA)

## (undated) DEVICE — GLOVE BIOGEL SZ 6.5 SURGICAL PF LTX (50PR/BX 4BX/CA)

## (undated) DEVICE — DETERGENT RENUZYME PLUS 10 OZ PACKET (50/BX)

## (undated) DEVICE — SUTURE 3-0 VICRYL PLUS SH - 27 INCH (36/BX)

## (undated) DEVICE — SENSOR SPO2 NEO LNCS ADHESIVE (20/BX) SEE USER NOTES

## (undated) DEVICE — SET LEADWIRE 5 LEAD BEDSIDE DISPOSABLE ECG (1SET OF 5/EA)

## (undated) DEVICE — SUTURE 4-0 VICRYL PLUS FS-2 - 27 INCH (36/BX)

## (undated) DEVICE — PACK GASTRIC BANDING OR - (1/CA)

## (undated) DEVICE — SYRINGE SAFETY 5 ML 18 GA X 1-1/2 BLUNT LL (100/BX 4BX/CA)

## (undated) DEVICE — TROCAR SEPARATOR 15MMZTHREAD - (6/BX)

## (undated) DEVICE — SPONGE GAUZESTER. 2X2 4-PL - (2/PK 50PK/BX 30BX/CS)

## (undated) DEVICE — ENDOSTITCH10MM SUTURING DEVIC - (3/CA)

## (undated) DEVICE — ELECTRODE 850 FOAM ADHESIVE - HYDROGEL RADIOTRNSPRNT (50/PK)